# Patient Record
Sex: FEMALE | Race: WHITE | Employment: FULL TIME | URBAN - METROPOLITAN AREA
[De-identification: names, ages, dates, MRNs, and addresses within clinical notes are randomized per-mention and may not be internally consistent; named-entity substitution may affect disease eponyms.]

---

## 2017-03-07 ENCOUNTER — OFFICE VISIT (OUTPATIENT)
Dept: DERMATOLOGY | Age: 37
End: 2017-03-07

## 2017-03-07 DIAGNOSIS — L30.9 FACIAL DERMATITIS: Primary | ICD-10-CM

## 2017-03-07 PROCEDURE — 99213 OFFICE O/P EST LOW 20 MIN: CPT | Performed by: DERMATOLOGY

## 2017-03-07 RX ORDER — ALCLOMETASONE DIPROPIONATE 0.5 MG/G
CREAM TOPICAL
Qty: 30 G | Refills: 2 | Status: SHIPPED | OUTPATIENT
Start: 2017-03-07 | End: 2017-04-07

## 2017-03-30 ENCOUNTER — HOSPITAL ENCOUNTER (OUTPATIENT)
Dept: OTHER | Age: 37
Discharge: HOME | End: 2017-03-30
Attending: ALLERGY & IMMUNOLOGY | Admitting: ALLERGY & IMMUNOLOGY

## 2017-04-02 LAB
ALLERGEN CRAB IGE: 15.2 KU/L
ALLERGEN LOBSTER IGE: 6.24 KU/L
ALLERGEN SEE NOTE: NORMAL
ALLERGEN SHRIMP IGE: 38.1 KU/L

## 2017-04-05 PROBLEM — Z79.899 HIGH RISK MEDICATION USE: Status: ACTIVE | Noted: 2017-04-05

## 2017-04-05 PROBLEM — K50.811 CROHN'S DISEASE OF BOTH SMALL AND LARGE INTESTINE WITH RECTAL BLEEDING (HCC): Status: ACTIVE | Noted: 2017-04-05

## 2017-04-07 ENCOUNTER — OFFICE VISIT (OUTPATIENT)
Dept: RHEUMATOLOGY | Age: 37
End: 2017-04-07

## 2017-04-07 VITALS
BODY MASS INDEX: 32.8 KG/M2 | WEIGHT: 194.1 LBS | SYSTOLIC BLOOD PRESSURE: 120 MMHG | HEART RATE: 97 BPM | DIASTOLIC BLOOD PRESSURE: 78 MMHG

## 2017-04-07 DIAGNOSIS — Z79.899 HIGH RISK MEDICATION USE: ICD-10-CM

## 2017-04-07 DIAGNOSIS — K50.811 CROHN'S DISEASE OF BOTH SMALL AND LARGE INTESTINE WITH RECTAL BLEEDING (HCC): ICD-10-CM

## 2017-04-07 DIAGNOSIS — M45.9 ANKYLOSING SPONDYLITIS (HCC): Primary | ICD-10-CM

## 2017-04-07 LAB
A/G RATIO: 1.3 (ref 1.1–2.2)
ALBUMIN SERPL-MCNC: 4.1 G/DL (ref 3.4–5)
ALP BLD-CCNC: 51 U/L (ref 40–129)
ALT SERPL-CCNC: 44 U/L (ref 10–40)
ANION GAP SERPL CALCULATED.3IONS-SCNC: 15 MMOL/L (ref 3–16)
AST SERPL-CCNC: 27 U/L (ref 15–37)
BASOPHILS ABSOLUTE: 0 K/UL (ref 0–0.2)
BASOPHILS RELATIVE PERCENT: 0.7 %
BILIRUB SERPL-MCNC: 0.7 MG/DL (ref 0–1)
BUN BLDV-MCNC: 10 MG/DL (ref 7–20)
C-REACTIVE PROTEIN: 3.1 MG/L (ref 0–5.1)
CALCIUM SERPL-MCNC: 9.7 MG/DL (ref 8.3–10.6)
CHLORIDE BLD-SCNC: 100 MMOL/L (ref 99–110)
CO2: 26 MMOL/L (ref 21–32)
CREAT SERPL-MCNC: 0.6 MG/DL (ref 0.6–1.1)
EOSINOPHILS ABSOLUTE: 0.2 K/UL (ref 0–0.6)
EOSINOPHILS RELATIVE PERCENT: 2.8 %
GFR AFRICAN AMERICAN: >60
GFR NON-AFRICAN AMERICAN: >60
GLOBULIN: 3.1 G/DL
GLUCOSE BLD-MCNC: 88 MG/DL (ref 70–99)
HCT VFR BLD CALC: 42 % (ref 36–48)
HEMOGLOBIN: 13.8 G/DL (ref 12–16)
LYMPHOCYTES ABSOLUTE: 1.6 K/UL (ref 1–5.1)
LYMPHOCYTES RELATIVE PERCENT: 23.5 %
MCH RBC QN AUTO: 28.9 PG (ref 26–34)
MCHC RBC AUTO-ENTMCNC: 32.8 G/DL (ref 31–36)
MCV RBC AUTO: 88 FL (ref 80–100)
MONOCYTES ABSOLUTE: 0.6 K/UL (ref 0–1.3)
MONOCYTES RELATIVE PERCENT: 8.8 %
NEUTROPHILS ABSOLUTE: 4.4 K/UL (ref 1.7–7.7)
NEUTROPHILS RELATIVE PERCENT: 64.2 %
PDW BLD-RTO: 14.4 % (ref 12.4–15.4)
PLATELET # BLD: 260 K/UL (ref 135–450)
PMV BLD AUTO: 9.5 FL (ref 5–10.5)
POTASSIUM SERPL-SCNC: 4.6 MMOL/L (ref 3.5–5.1)
RBC # BLD: 4.77 M/UL (ref 4–5.2)
SEDIMENTATION RATE, ERYTHROCYTE: 15 MM/HR (ref 0–20)
SODIUM BLD-SCNC: 141 MMOL/L (ref 136–145)
TOTAL PROTEIN: 7.2 G/DL (ref 6.4–8.2)
WBC # BLD: 6.9 K/UL (ref 4–11)

## 2017-04-07 PROCEDURE — 99214 OFFICE O/P EST MOD 30 MIN: CPT | Performed by: INTERNAL MEDICINE

## 2017-04-07 RX ORDER — PANTOPRAZOLE SODIUM 40 MG/1
40 GRANULE, DELAYED RELEASE ORAL
COMMUNITY
End: 2017-09-08

## 2017-04-07 RX ORDER — ALPRAZOLAM 0.5 MG/1
0.5 TABLET ORAL NIGHTLY PRN
COMMUNITY
End: 2018-01-08

## 2017-04-24 ENCOUNTER — TELEPHONE (OUTPATIENT)
Dept: INTERNAL MEDICINE CLINIC | Age: 37
End: 2017-04-24

## 2017-05-24 ENCOUNTER — TELEPHONE (OUTPATIENT)
Dept: RHEUMATOLOGY | Age: 37
End: 2017-05-24

## 2017-06-15 ENCOUNTER — OFFICE VISIT (OUTPATIENT)
Dept: ORTHOPEDIC SURGERY | Age: 37
End: 2017-06-15

## 2017-06-15 VITALS
SYSTOLIC BLOOD PRESSURE: 120 MMHG | TEMPERATURE: 98.1 F | HEIGHT: 65 IN | DIASTOLIC BLOOD PRESSURE: 78 MMHG | BODY MASS INDEX: 30.49 KG/M2 | WEIGHT: 183 LBS | HEART RATE: 85 BPM

## 2017-06-15 DIAGNOSIS — Z96.641 HISTORY OF TOTAL HIP REPLACEMENT, RIGHT: Primary | ICD-10-CM

## 2017-06-15 PROCEDURE — 99213 OFFICE O/P EST LOW 20 MIN: CPT | Performed by: ORTHOPAEDIC SURGERY

## 2017-06-16 ENCOUNTER — TELEPHONE (OUTPATIENT)
Dept: INTERNAL MEDICINE CLINIC | Age: 37
End: 2017-06-16

## 2017-07-18 ENCOUNTER — TELEPHONE (OUTPATIENT)
Dept: INTERNAL MEDICINE CLINIC | Age: 37
End: 2017-07-18

## 2017-07-26 ENCOUNTER — TELEPHONE (OUTPATIENT)
Dept: RHEUMATOLOGY | Age: 37
End: 2017-07-26

## 2017-07-26 DIAGNOSIS — M45.9 ANKYLOSING SPONDYLITIS (HCC): Primary | ICD-10-CM

## 2017-07-27 RX ORDER — METAXALONE 800 MG/1
800 TABLET ORAL 3 TIMES DAILY
Qty: 30 TABLET | Refills: 0 | Status: SHIPPED | OUTPATIENT
Start: 2017-07-27 | End: 2017-08-06

## 2017-08-03 ENCOUNTER — HOSPITAL ENCOUNTER (OUTPATIENT)
Dept: OTHER | Age: 37
Discharge: HOME | End: 2017-08-03
Attending: FAMILY MEDICINE | Admitting: FAMILY MEDICINE

## 2017-08-03 DIAGNOSIS — R52 PAIN: ICD-10-CM

## 2017-08-03 DIAGNOSIS — M45.6 ANKYLOSING SPONDYLITIS LUMBAR REGION (HCC): ICD-10-CM

## 2017-08-03 DIAGNOSIS — M54.6 PAIN IN THORACIC SPINE: ICD-10-CM

## 2017-08-03 DIAGNOSIS — M54.9 DORSALGIA: ICD-10-CM

## 2017-08-23 ENCOUNTER — TELEPHONE (OUTPATIENT)
Dept: INTERNAL MEDICINE CLINIC | Age: 37
End: 2017-08-23

## 2017-09-08 ENCOUNTER — OFFICE VISIT (OUTPATIENT)
Dept: RHEUMATOLOGY | Age: 37
End: 2017-09-08

## 2017-09-08 VITALS
HEART RATE: 100 BPM | BODY MASS INDEX: 32.45 KG/M2 | DIASTOLIC BLOOD PRESSURE: 77 MMHG | SYSTOLIC BLOOD PRESSURE: 129 MMHG | WEIGHT: 192 LBS

## 2017-09-08 DIAGNOSIS — M45.7 ANKYLOSING SPONDYLITIS OF LUMBOSACRAL REGION (HCC): Primary | ICD-10-CM

## 2017-09-08 DIAGNOSIS — Z79.899 HIGH RISK MEDICATION USE: ICD-10-CM

## 2017-09-08 DIAGNOSIS — K50.811 CROHN'S DISEASE OF BOTH SMALL AND LARGE INTESTINE WITH RECTAL BLEEDING (HCC): ICD-10-CM

## 2017-09-08 DIAGNOSIS — G89.4 CHRONIC PAIN SYNDROME: ICD-10-CM

## 2017-09-08 PROBLEM — G89.29 CHRONIC PAIN: Status: ACTIVE | Noted: 2017-09-08

## 2017-09-08 PROCEDURE — 99214 OFFICE O/P EST MOD 30 MIN: CPT | Performed by: INTERNAL MEDICINE

## 2017-09-08 RX ORDER — METHOTREXATE 25 MG/ML
25 INJECTION, SOLUTION INTRA-ARTERIAL; INTRAMUSCULAR; INTRAVENOUS WEEKLY
Qty: 4 VIAL | Refills: 3 | Status: SHIPPED | OUTPATIENT
Start: 2017-09-08 | End: 2018-01-08

## 2017-09-08 RX ORDER — HYDROCODONE BITARTRATE AND ACETAMINOPHEN 5; 325 MG/1; MG/1
1 TABLET ORAL EVERY 6 HOURS PRN
COMMUNITY
End: 2017-11-03

## 2017-09-08 RX ORDER — GABAPENTIN 100 MG/1
CAPSULE ORAL
Qty: 90 CAPSULE | Refills: 4 | Status: SHIPPED | OUTPATIENT
Start: 2017-09-08 | End: 2018-01-08

## 2017-09-08 RX ORDER — OMEPRAZOLE 40 MG/1
40 CAPSULE, DELAYED RELEASE ORAL DAILY
COMMUNITY
End: 2018-01-08

## 2017-09-08 RX ORDER — OXYCODONE HYDROCHLORIDE AND ACETAMINOPHEN 5; 325 MG/1; MG/1
1 TABLET ORAL EVERY 8 HOURS PRN
Qty: 90 TABLET | Refills: 0 | Status: SHIPPED | OUTPATIENT
Start: 2017-10-08 | End: 2018-01-08

## 2017-09-08 RX ORDER — PREDNISONE 10 MG/1
10 TABLET ORAL DAILY
COMMUNITY
End: 2017-09-08 | Stop reason: SDUPTHER

## 2017-09-08 RX ORDER — PREDNISONE 10 MG/1
10 TABLET ORAL 2 TIMES DAILY
Qty: 60 TABLET | Refills: 5 | Status: SHIPPED | OUTPATIENT
Start: 2017-09-08 | End: 2018-01-08

## 2017-09-08 RX ORDER — OXYCODONE HYDROCHLORIDE AND ACETAMINOPHEN 5; 325 MG/1; MG/1
1 TABLET ORAL EVERY 8 HOURS PRN
Qty: 90 TABLET | Refills: 0 | Status: SHIPPED | OUTPATIENT
Start: 2017-09-08 | End: 2018-01-08

## 2017-09-08 RX ORDER — FOLIC ACID 1 MG/1
1 TABLET ORAL DAILY
COMMUNITY
End: 2018-01-08

## 2017-09-12 ENCOUNTER — HOSPITAL ENCOUNTER (OUTPATIENT)
Dept: MRI IMAGING | Age: 37
Discharge: HOME | End: 2017-09-12
Attending: INTERNAL MEDICINE | Admitting: INTERNAL MEDICINE

## 2017-09-12 DIAGNOSIS — M45.7 ANKYLOSING SPONDYLITIS OF LUMBOSACRAL REGION (HCC): ICD-10-CM

## 2017-10-03 LAB
ALBUMIN SERPL-MCNC: 3.9 G/DL (ref 3.4–5)
ALP BLD-CCNC: 40 U/L (ref 40–129)
ALT SERPL-CCNC: 31 U/L (ref 10–40)
AST SERPL-CCNC: 22 U/L (ref 15–37)
BASOPHILS ABSOLUTE: 0 K/UL (ref 0–0.2)
BASOPHILS RELATIVE PERCENT: 0.4 %
BILIRUB SERPL-MCNC: 0.7 MG/DL (ref 0–1)
BILIRUBIN DIRECT: <0.2 MG/DL (ref 0–0.3)
BILIRUBIN, INDIRECT: NORMAL MG/DL (ref 0–1)
C-REACTIVE PROTEIN: 13.7 MG/L (ref 0–5.1)
EOSINOPHILS ABSOLUTE: 0.1 K/UL (ref 0–0.6)
EOSINOPHILS RELATIVE PERCENT: 1.9 %
HCT VFR BLD CALC: 37.6 % (ref 36–48)
HEMOGLOBIN: 12.7 G/DL (ref 12–16)
LYMPHOCYTES ABSOLUTE: 1.6 K/UL (ref 1–5.1)
LYMPHOCYTES RELATIVE PERCENT: 21 %
MCH RBC QN AUTO: 30 PG (ref 26–34)
MCHC RBC AUTO-ENTMCNC: 33.9 G/DL (ref 31–36)
MCV RBC AUTO: 88.3 FL (ref 80–100)
MONOCYTES ABSOLUTE: 0.6 K/UL (ref 0–1.3)
MONOCYTES RELATIVE PERCENT: 7.9 %
NEUTROPHILS ABSOLUTE: 5.3 K/UL (ref 1.7–7.7)
NEUTROPHILS RELATIVE PERCENT: 68.8 %
PDW BLD-RTO: 14.8 % (ref 12.4–15.4)
PLATELET # BLD: 261 K/UL (ref 135–450)
PMV BLD AUTO: 8.7 FL (ref 5–10.5)
RBC # BLD: 4.25 M/UL (ref 4–5.2)
TOTAL CK: 50 U/L (ref 26–192)
TOTAL PROTEIN: 6.7 G/DL (ref 6.4–8.2)
WBC # BLD: 7.7 K/UL (ref 4–11)

## 2017-11-03 ENCOUNTER — OFFICE VISIT (OUTPATIENT)
Dept: RHEUMATOLOGY | Age: 37
End: 2017-11-03

## 2017-11-03 VITALS
SYSTOLIC BLOOD PRESSURE: 139 MMHG | HEART RATE: 86 BPM | BODY MASS INDEX: 32.45 KG/M2 | DIASTOLIC BLOOD PRESSURE: 89 MMHG | WEIGHT: 192 LBS

## 2017-11-03 DIAGNOSIS — M45.7 ANKYLOSING SPONDYLITIS OF LUMBOSACRAL REGION (HCC): Primary | ICD-10-CM

## 2017-11-03 DIAGNOSIS — M26.69 TMJ LOCKING: Primary | ICD-10-CM

## 2017-11-03 DIAGNOSIS — Z79.899 HIGH RISK MEDICATION USE: ICD-10-CM

## 2017-11-03 DIAGNOSIS — K50.811 CROHN'S DISEASE OF BOTH SMALL AND LARGE INTESTINE WITH RECTAL BLEEDING (HCC): ICD-10-CM

## 2017-11-03 DIAGNOSIS — G89.4 CHRONIC PAIN SYNDROME: ICD-10-CM

## 2017-11-03 PROCEDURE — 99214 OFFICE O/P EST MOD 30 MIN: CPT | Performed by: INTERNAL MEDICINE

## 2017-11-03 RX ORDER — PREDNISONE 2.5 MG
2.5 TABLET ORAL DAILY
Qty: 30 TABLET | Refills: 11 | Status: SHIPPED | OUTPATIENT
Start: 2017-11-03 | End: 2018-01-08

## 2017-11-03 NOTE — PROGRESS NOTES
prn 9/8/17  Yes Christiano More MD   ALPRAZolam Verlee Cali) 0.5 MG tablet Take 0.5 mg by mouth nightly as needed for Sleep   Yes Historical Provider, MD   EPINEPHrine (EPIPEN) 0.3 MG/0.3ML LOBO injection Inject 0.3 mLs into the muscle once as needed for 1 dose. 1/23/14  Yes Melanie Queen MD         Subjective:      Review of Systems:    GENERAL: denies fatigue ; no unintentional weight loss  HEENT:   No nasal ulcers; no oral ulcers and sores  LUNGS: No shortness of breathing or  breathing difficulties  GI: No GI upset from medications  MUSCULOSKELETAL:see HPI  SKIN:     Denies having any new skin lesions  LYMPHADENOPATHY:     Denies having any recent infectious illness    Objective:     Physical Exam:    /89   Pulse 86   Wt 192 lb (87.1 kg)   BMI 32.45 kg/m²     GENERAL:Able to ambulate without any assistance. Tearful  HEENT: no evidence of any oral ulcers, lesions or sores  LUNGS:clear to auscultation bilaterally, good air intake  CARDIOVASCULAR:regular rate  MUSCULOSKELETAL: No evidence of any synovitis, swelling, warmth, or limitation of motion of patient's upper or lower peripheral joints. No tenderness over the axial spine to light palpation. There are no  tender fibromyalgia points. Strength is  5/5  in both upper and lower extremities. Skin:Skin is warm and dry. No Petechiae, telangiectasia, purpura, or rash. Palpation of the skin and subcutaneous tissues reveals no evidence of induration, subcutaneous nodule or  tenderness.    Lymphadenopathy: no evidence of any palpable cervical or supraclavicular lymph nodes       DATA:     Labs:     Lab Results   Component Value Date    WBC 7.7 10/03/2017    HGB 12.7 10/03/2017    HCT 37.6 10/03/2017    MCV 88.3 10/03/2017     10/03/2017       Chemistry        Component Value Date/Time     04/07/2017 1027    K 4.6 04/07/2017 1027     04/07/2017 1027    CO2 26 04/07/2017 1027    BUN 10 04/07/2017 1027    CREATININE 0.6 04/07/2017 1027 Component Value Date/Time    CALCIUM 9.7 04/07/2017 1027    ALKPHOS 40 10/03/2017 0916    AST 22 10/03/2017 0916    ALT 31 10/03/2017 0916    BILITOT 0.7 10/03/2017 0916          Lab Results   Component Value Date    SEDRATE 15 04/07/2017     No results found for: CHRIS, KEVIN, SSA, SSB, C3, C4  No results found for: HAV, HEPAIGM, HEPBIGM, HEPBCAB, HBEAG, HEPCAB  No results found for: CHRIS, ANATITER, ANAINT, PATH  No results found for: DSDNAG, DSDNAIGGIFA  No results found for: SSAROAB, SSALAAB  No results found for: SMAB, RNPAB  No results found for: CENTABIGG  Lab Results   Component Value Date    SEDRATE 15 04/07/2017     No results found for: C3, C4, ACE  No results found for: JO1, VITD25, [id number]    No results found. Medications tried//failed:    Stelara    Assessment/Plan:       Assessment/Plan:  Stepan Jefferson was seen today for follow-up. Diagnoses and all orders for this visit:    Ankylosing spondylitis of lumbosacral region ([facility] [address] - Currently stable on methotrexate 25 mg subcutaneously and Cimzia subcutaneous injectables. Doing well. Tapering instructions for the prednisone given and also for the Neurontin. By next visit she'll be off of both. Labs to be done prior to next office visit. In addition advocated that she start a dedicated exercise program.  -     CBC Auto Differential  -     C-Reactive Protein  -     Creatinine, Serum  -     Hepatic Function Panel  -     predniSONE (DELTASONE) 2.5 MG tablet; Take 1 tablet by mouth daily  -     CBC Auto Differential; Future  -     C-Reactive Protein; Future  -     Creatinine, Serum; Future  -     Hepatic Function Panel; Future    High risk medication use - Will check labs to evaluate for any evidence of drug toxicity or side effects.    -     CBC Auto Differential  -     C-Reactive Protein  -     Creatinine, Serum  -     Hepatic Function Panel  -     CBC Auto Differential; Future  -     C-Reactive Protein; Future  -     Creatinine, Serum;  Future  - Hepatic Function Panel; Future    Crohn's disease of both small and large intestine with rectal bleeding ([address]- currently stable on methotrexate and Cimzia. Continue to follow-up with Dr. Naveen Barth. Chronic pain syndrome- patient no longer needs any chronic narcotics. Doing well. Return in about 3 months (around 2/3/2018). The risks and benefits of my recommendations, as well as other treatment options, benefits and side effects were discussed with the patient today. Questions were answered. [address] [facility]  [facility]  [address] [place] [address] [address]  ZYZAL:[phone]  CBW:[phone]    NOTE: This report is transcribed by using voice recognition software dragon. Every effort is made to ensure accuracy; however, inadvertent computerized transcription errors may be present.                      Mariana Mata MD, 11/3/2017 9:42 AM

## 2017-12-12 ENCOUNTER — TELEPHONE (OUTPATIENT)
Dept: INTERNAL MEDICINE CLINIC | Age: 37
End: 2017-12-12

## 2017-12-12 DIAGNOSIS — M45.7 ANKYLOSING SPONDYLITIS OF LUMBOSACRAL REGION (HCC): ICD-10-CM

## 2017-12-12 NOTE — TELEPHONE ENCOUNTER
Pt calling, she was seen on 11/3. She states Dr was going to try and get her Cimzia approved again. Pt has nto heard anything and it out of medication. Viewed media and did not see where a reply has been sent back. Pt also states her specialty pharmacy will be changing 1/1/2018. Not sure if this will have to try and get approved again because of that? Asking for a call back.

## 2017-12-12 NOTE — TELEPHONE ENCOUNTER
Spoke with Nicola Zeng They do not have record I resubmitted for pt and informed her  /Pt insurance will stay the same but meds will be from [facility]

## 2017-12-20 ENCOUNTER — TELEPHONE (OUTPATIENT)
Dept: RHEUMATOLOGY | Age: 37
End: 2017-12-20

## 2017-12-20 NOTE — TELEPHONE ENCOUNTER
Spoke with pt about denial of cimzia (which her insurance has been paying for ) per pt    Pt has tried Humira - not effective  Enbrel- Rectal abscesses  Remicade- anaphylactic symptoms  Allergy list updated

## 2017-12-21 NOTE — PROGRESS NOTES
Addendum to note dictated November 3, 2017.     Medications tried//failed//contraindicated:    Stelaralack of efficacy  Humiralack of efficacy  Remicadeanaphylactic reaction  Cosentyxcontraindicated given her underlying Crohn's disease  Enbrelrectal abscess

## 2017-12-29 ENCOUNTER — TELEPHONE (OUTPATIENT)
Dept: RHEUMATOLOGY | Age: 37
End: 2017-12-29

## 2017-12-29 NOTE — TELEPHONE ENCOUNTER
Fax received for approval of Cimzia Pt was informed  Approved 12/2/17- 12/28/2022 Pt informed and med pended Pt has some syringes at home

## 2018-01-08 ENCOUNTER — OFFICE VISIT (OUTPATIENT)
Dept: BARIATRICS/WEIGHT MGMT | Age: 38
End: 2018-01-08

## 2018-01-08 VITALS
BODY MASS INDEX: 32.65 KG/M2 | HEIGHT: 65 IN | DIASTOLIC BLOOD PRESSURE: 82 MMHG | WEIGHT: 196 LBS | SYSTOLIC BLOOD PRESSURE: 134 MMHG | HEART RATE: 100 BPM

## 2018-01-08 DIAGNOSIS — E66.9 CLASS 1 OBESITY: Primary | ICD-10-CM

## 2018-01-08 DIAGNOSIS — Z71.3 DIETARY COUNSELING AND SURVEILLANCE: ICD-10-CM

## 2018-01-08 DIAGNOSIS — G47.33 OBSTRUCTIVE SLEEP APNEA: ICD-10-CM

## 2018-01-08 DIAGNOSIS — Z79.899 HIGH RISK MEDICATIONS (NOT ANTICOAGULANTS) LONG-TERM USE: ICD-10-CM

## 2018-01-08 PROCEDURE — 93000 ELECTROCARDIOGRAM COMPLETE: CPT | Performed by: FAMILY MEDICINE

## 2018-01-08 PROCEDURE — 99204 OFFICE O/P NEW MOD 45 MIN: CPT | Performed by: FAMILY MEDICINE

## 2018-01-08 RX ORDER — FOLIC ACID 1 MG/1
1 TABLET ORAL DAILY
COMMUNITY
End: 2018-05-04 | Stop reason: SDUPTHER

## 2018-01-08 RX ORDER — METHOTREXATE SODIUM 1 G/1
INJECTION, POWDER, LYOPHILIZED, FOR SOLUTION INTRA-ARTERIAL; INTRAMUSCULAR; INTRATHECAL; INTRAVENOUS WEEKLY
COMMUNITY
End: 2018-02-02 | Stop reason: CLARIF

## 2018-01-08 ASSESSMENT — PATIENT HEALTH QUESTIONNAIRE - PHQ9
SUM OF ALL RESPONSES TO PHQ QUESTIONS 1-9: 0
SUM OF ALL RESPONSES TO PHQ9 QUESTIONS 1 & 2: 0
1. LITTLE INTEREST OR PLEASURE IN DOING THINGS: 0
2. FEELING DOWN, DEPRESSED OR HOPELESS: 0

## 2018-01-08 ASSESSMENT — ENCOUNTER SYMPTOMS
EYES NEGATIVE: 1
RESPIRATORY NEGATIVE: 1
GASTROINTESTINAL NEGATIVE: 1

## 2018-01-08 NOTE — PROGRESS NOTES
Patient: Gissell Davies                      Encounter Date: 1/8/2018    YOB: 1980               Age: 40 y.o.    BP (!) 136/94   Pulse 100   Ht 5' 4.5\" (1.638 m)   Wt 196 lb (88.9 kg)   BMI 33.12 kg/m²                                          Body mass index is 33.12 kg/m². Chief Complaint   Patient presents with    Weight Management     NP, MWM       HPI:    40 y.o. female presents to establish care and join our medical weight loss program. The patient's medical history is significant for class I obesity, ankylosing spondylitis, and Crohn's disease. The patient has a long-standing history of obesity which started gradually. The problem is mild. The patient has been gaining weight. Risk factors include annual weight gain of >2 lbs (1 kg)/ year and sedentary lifestyle. Aggravating factors include poor diet and lack of physical activity. The patient has tried various diet/exercise plans which have been ineffective in the long-run. She was recently weaned of off prednisone and gabapentin. When did you become overweight? [] Childhood   [] Teens   [x] Adulthood   [] Pregnancy   [] Menopause    Highest adult weight: Current weight      Weight history:    Vitals in Chronological Order 6/15/2016 6/28/2016 10/21/2016 12/27/2016   Weight 183 lb 182 lb 187 lb 3.2 oz 183 lb     Vitals in Chronological Order 4/7/2017 6/15/2017 9/8/2017 11/3/2017   Weight 194 lb 1.6 oz 183 lb 192 lb 192 lb     Vitals in Chronological Order 1/8/2018   Weight 196 lb       Triggers for weight gain? [x] Stress   [] Illness   [] Medications   [] Travel    [] Injury     [] Nightshift work   [] Insomnia  [] No specific triggers   [] Other    Food triggers:   [x] Stress   [x] Boredom   [x] Fast food   [x] Eating out   [] Seeking reward   [] Social     Have you ever taken medications to help you lose weight?    [] Yes  [x] No    Have you ever been on a meal replacement program?  [] Yes  [x] No    The patient digestive system(787.99)    Abnormal screening cardiac CT    General medical examination    Other general medical examination for administrative purposes    Encounter for long-term (current) use of other medications    Need for influenza vaccination    Tachycardia    Ulcerative colitis ([address]    Perirectal fistula    Primary localized osteoarthrosis, pelvic region and thigh    History of total hip replacement    Acute pharyngitis    High risk medication use    Crohn's disease of both small and large intestine with rectal bleeding (HCC)    Ankylosing spondylitis of lumbosacral region ([address]    Chronic pain       Past Medical History:   Diagnosis Date    Ankylosing spondylitis ([address]     Arthritis     Crohn's     Stotz    Fistula     RECTAL    GERD (gastroesophageal reflux disease)     Iritis 2010    Microcytic anemia     Umbilical hernia        Past Surgical History:   Procedure Laterality Date    ABSCESS DRAINAGE  9-2011    RECTAL    COLONOSCOPY      COLONOSCOPY  11-    COLONOSCOPY  12/2011    JOINT REPLACEMENT Right     HIP    OTHER SURGICAL HISTORY  2007    Terminal ileum resection     RECTAL SURGERY  07/31/2012    RECTAL ADVANCEMENT FLAP    SIGMOIDOSCOPY  9/12/12    FLEXIBLE    SIGMOIDOSCOPY  6/7/2017    flexible sigmoidoscopy    SMALL INTESTINE SURGERY      ileocecectomy    UPPER GASTROINTESTINAL ENDOSCOPY         Family History   Problem Relation Age of Onset    Breast Cancer Mother     Cancer Mother     High Blood Pressure Father     High Cholesterol Father     Diabetes Father     Cancer Paternal Aunt      colon    Diabetes Maternal Grandmother     Cancer Maternal Grandmother      colon    Cancer Maternal Grandfather      colon    High Blood Pressure Paternal Grandmother     Cancer Paternal Grandmother      colon       Review of Systems   HENT: Negative. Eyes: Negative. Respiratory: Negative. Cardiovascular: Negative.     Gastrointestinal: Negative. Endocrine: Negative. Musculoskeletal: Negative. Neurological: Negative. Psychiatric/Behavioral: Negative. Physical Exam   Constitutional: She is oriented to person, place, and time. She appears well-developed and well-nourished. Eyes: Conjunctivae and EOM are normal.   Neck: Normal range of motion. Neck supple. No thyromegaly present. Cardiovascular: Normal rate, regular rhythm and normal heart sounds. Exam reveals no gallop and no friction rub. No murmur heard. Pulmonary/Chest: Effort normal and breath sounds normal. No respiratory distress. She has no wheezes. She has no rales. Abdominal: Soft. She exhibits no mass. There is no tenderness. There is no rebound and no guarding. Musculoskeletal: She exhibits no edema. Lymphadenopathy:     She has no cervical adenopathy. Neurological: She is alert and oriented to person, place, and time. Skin: Skin is warm and dry. Psychiatric: She has a normal mood and affect.  Her behavior is normal. Judgment and thought content normal.       Hospital Outpatient Visit on 09/12/2017   Component Date Value Ref Range Status    WBC 10/03/2017 7.7  4.0 - 11.0 K/uL Final    RBC 10/03/2017 4.25  4.00 - 5.20 M/uL Final    Hemoglobin 10/03/2017 12.7  12.0 - 16.0 g/dL Final    Hematocrit 10/03/2017 37.6  36.0 - 48.0 % Final    MCV 10/03/2017 88.3  80.0 - 100.0 fL Final    MCH 10/03/2017 30.0  26.0 - 34.0 pg Final    MCHC 10/03/2017 33.9  31.0 - 36.0 g/dL Final    RDW 10/03/2017 14.8  12.4 - 15.4 % Final    Platelets 04/11/9857 261  135 - 450 K/uL Final    MPV 10/03/2017 8.7  5.0 - 10.5 fL Final    Neutrophils % 10/03/2017 68.8  % Final    Lymphocytes % 10/03/2017 21.0  % Final    Monocytes % 10/03/2017 7.9  % Final    Eosinophils % 10/03/2017 1.9  % Final    Basophils % 10/03/2017 0.4  % Final    Neutrophils # 10/03/2017 5.3  1.7 - 7.7 K/uL Final    Lymphocytes # 10/03/2017 1.6  1.0 - 5.1 K/ Final    Monocytes # 10/03/2017

## 2018-01-08 NOTE — PROGRESS NOTES
often. Patient describes level of activity as sedentary. Goals  Weight: 150-160  Health Improvement: Less pain from arthritis    Assessment  Nutritional Needs: RMR=(9.99 x 88.9) + (6.25 x 163.8) - (4.92 x 37 y.o.) -161= 1569 kcal x 1.4 (sedentary activity factor)= 2197 kcal - 1000 (for 2 lb weight loss/week)= 1197 kcal.    Plan  Plan/Recommendations: General weight loss/lifestyle modification strategies discussed (elicit support from others; identify saboteurs; non-food rewards, etc). Optifast:  Pt is less interested unless meds are not an option  Diet Medications:  Pt is interested    PES Statement:  Overweight/Obesity related to increased caloric intake and decreased physical activity as evidenced by BMI. Body mass index is 33.12 kg/m². .    Will follow up as necessary.     Isha Terjo

## 2018-01-08 NOTE — PATIENT INSTRUCTIONS
changes in your eating habits. Instead of a diet, focus on lifestyle changes that will improve your health and achieve the right balance of energy and calories. To lose weight, you need to burn more calories than you take in. You can do it by eating healthy foods in reasonable amounts and becoming more active, even a little bit every day. Making small changes over time can add up to a lot. Make a plan for change. Many people have found that naming their reasons for change and staying focused on their plan can make a big difference. Work with your doctor to create a plan that is right for you. · Ask yourself: Michael Merrill are my personal, most powerful reasons for wanting this change? What will my life look like when I've made the change? \"  · Set your long-term goal. Make it specific, such as \"I will lose x pounds. \"  · Break your long-term goal into smaller, short-term goals. Make these small steps specific and within your reach, things you know you can do. These steps are what keep you going from day to day. How can you stay on your plan for change? Be ready. Choose to start during a time when there are few events that might trigger slip-ups, like holidays, social events, and high-stress periods. Decide on your first few steps. Most people have more success when they make small changes, one step at a time. For example, you might switch a daily candy bar to a piece of fruit, walk 10 minutes more, or add more vegetables to a meal.  Line up your support people. Make sure you're not going to be alone as you make this change. Connect with people who understand how important it is to you. Ask family members and friends for help in keeping with your plan. And think about who could make it harder for you, and how to handle them. Try tracking. People who keep track of what they eat, feel, and do are better at losing weight. Try writing down things like:  · What and how much you eat.   · How you feel before and after each

## 2018-01-09 DIAGNOSIS — Z79.899 HIGH RISK MEDICATION USE: ICD-10-CM

## 2018-01-09 DIAGNOSIS — E66.9 CLASS 1 OBESITY: ICD-10-CM

## 2018-01-09 DIAGNOSIS — M45.7 ANKYLOSING SPONDYLITIS OF LUMBOSACRAL REGION (HCC): ICD-10-CM

## 2018-01-09 LAB
ALBUMIN SERPL-MCNC: 4.6 G/DL (ref 3.4–5)
ALP BLD-CCNC: 50 U/L (ref 40–129)
ALT SERPL-CCNC: 50 U/L (ref 10–40)
AST SERPL-CCNC: 34 U/L (ref 15–37)
BASOPHILS ABSOLUTE: 0 K/UL (ref 0–0.2)
BASOPHILS RELATIVE PERCENT: 0.6 %
BILIRUB SERPL-MCNC: 1.2 MG/DL (ref 0–1)
BILIRUBIN DIRECT: <0.2 MG/DL (ref 0–0.3)
BILIRUBIN, INDIRECT: ABNORMAL MG/DL (ref 0–1)
C-REACTIVE PROTEIN: 1.9 MG/L (ref 0–5.1)
CHOLESTEROL, TOTAL: 147 MG/DL (ref 0–199)
CREAT SERPL-MCNC: 0.6 MG/DL (ref 0.6–1.1)
EOSINOPHILS ABSOLUTE: 0.1 K/UL (ref 0–0.6)
EOSINOPHILS RELATIVE PERCENT: 2.2 %
FOLATE: >20 NG/ML (ref 4.78–24.2)
GFR AFRICAN AMERICAN: >60
GFR NON-AFRICAN AMERICAN: >60
HCT VFR BLD CALC: 42.2 % (ref 36–48)
HDLC SERPL-MCNC: 59 MG/DL (ref 40–60)
HEMOGLOBIN: 13.8 G/DL (ref 12–16)
LDL CHOLESTEROL CALCULATED: 63 MG/DL
LYMPHOCYTES ABSOLUTE: 1.6 K/UL (ref 1–5.1)
LYMPHOCYTES RELATIVE PERCENT: 25.2 %
MCH RBC QN AUTO: 29.4 PG (ref 26–34)
MCHC RBC AUTO-ENTMCNC: 32.8 G/DL (ref 31–36)
MCV RBC AUTO: 89.7 FL (ref 80–100)
MONOCYTES ABSOLUTE: 0.5 K/UL (ref 0–1.3)
MONOCYTES RELATIVE PERCENT: 7.6 %
NEUTROPHILS ABSOLUTE: 4.1 K/UL (ref 1.7–7.7)
NEUTROPHILS RELATIVE PERCENT: 64.4 %
PDW BLD-RTO: 13.6 % (ref 12.4–15.4)
PLATELET # BLD: 275 K/UL (ref 135–450)
PMV BLD AUTO: 8.9 FL (ref 5–10.5)
RBC # BLD: 4.71 M/UL (ref 4–5.2)
TOTAL PROTEIN: 7.5 G/DL (ref 6.4–8.2)
TRIGL SERPL-MCNC: 123 MG/DL (ref 0–150)
TSH REFLEX: 1.23 UIU/ML (ref 0.27–4.2)
VITAMIN B-12: 802 PG/ML (ref 211–911)
VITAMIN D 25-HYDROXY: 17.5 NG/ML
VLDLC SERPL CALC-MCNC: 25 MG/DL
WBC # BLD: 6.3 K/UL (ref 4–11)

## 2018-01-10 RX ORDER — ERGOCALCIFEROL 1.25 MG/1
50000 CAPSULE ORAL WEEKLY
Qty: 8 CAPSULE | Refills: 0 | Status: SHIPPED | OUTPATIENT
Start: 2018-01-10 | End: 2018-05-07

## 2018-01-19 ENCOUNTER — OFFICE VISIT (OUTPATIENT)
Dept: BARIATRICS/WEIGHT MGMT | Age: 38
End: 2018-01-19

## 2018-01-19 VITALS
HEART RATE: 100 BPM | BODY MASS INDEX: 32.49 KG/M2 | DIASTOLIC BLOOD PRESSURE: 86 MMHG | SYSTOLIC BLOOD PRESSURE: 132 MMHG | WEIGHT: 195 LBS | HEIGHT: 65 IN

## 2018-01-19 DIAGNOSIS — E55.9 VITAMIN D DEFICIENCY: ICD-10-CM

## 2018-01-19 DIAGNOSIS — R74.01 ELEVATED ALT MEASUREMENT: ICD-10-CM

## 2018-01-19 DIAGNOSIS — Z71.3 DIETARY COUNSELING AND SURVEILLANCE: ICD-10-CM

## 2018-01-19 DIAGNOSIS — E66.9 CLASS 1 OBESITY: Primary | ICD-10-CM

## 2018-01-19 PROCEDURE — 99214 OFFICE O/P EST MOD 30 MIN: CPT | Performed by: FAMILY MEDICINE

## 2018-01-19 ASSESSMENT — ENCOUNTER SYMPTOMS
EYES NEGATIVE: 1
RESPIRATORY NEGATIVE: 1
GASTROINTESTINAL NEGATIVE: 1

## 2018-01-19 NOTE — PROGRESS NOTES
exercise and reducing sedentary time  [x] Treatment consent- Patient understands and agrees with the treatment plan   [x] Proper use of medication and side effects  [x] Labs  [x] EKG- NSR    Patient denies any history of cardiovascular disease (e.g., CAD, stroke, arrhythmias, CHF, uncontrolled HTN), seizure disorder, MAOI use within the last 2 weeks, hyperthyroidism, glaucoma, agitated states, history of drug abuse, pregnancy, nursing, known hypersensitivity to the prescribing meds, history of pancreatitis, or personal or family history of thyroid medullary cancer. Treatment start date: 1/20/18  12 weeks: 4/20/18  Starting weight: 195 pounds  Goal: At least 5% (9.5 pounds)    Key dietary points:    - Meats (preferably organic or grass fed) are great sources of protein and have no carbohydrates. - Recommend coconut, olive, avocado, or almond oils. - When buying dairy, choose regular or full fat options. - Choose vegetables that grow above ground as they are generally lower in carbohydrates and higher in fiber.  - Avoid starches such as bread, rice, potatoes, pasta and all sources of simple sugars (desserts, soda, breakfast cereals). - Choose beverages that are calorie and sugar free. Reminder regarding weight loss medications: You must be seen in office every 2-4 weeks to have your prescriptions refilled. If you are off of your medication for longer than 7 days, you will not be able to restart the medication for at least 6 months. Always call our office to report any side effects. Females, it is your responsibility to obtain negative pregnancy tests each month. No orders of the defined types were placed in this encounter. No Follow-up on file. This dictation was performed with a verbal recognition program (Dragon) and all efforts were made to ensure accuracy of this dictation. It is possible that there are still dictated errors within this note.  If so, please bring any errors to my

## 2018-02-02 DIAGNOSIS — M45.7 ANKYLOSING SPONDYLITIS OF LUMBOSACRAL REGION (HCC): ICD-10-CM

## 2018-02-02 RX ORDER — METHOTREXATE 25 MG/ML
25 INJECTION, SOLUTION INTRA-ARTERIAL; INTRAMUSCULAR; INTRAVENOUS WEEKLY
Qty: 4 VIAL | Refills: 11 | Status: SHIPPED | OUTPATIENT
Start: 2018-02-02 | End: 2019-02-21 | Stop reason: SDUPTHER

## 2018-02-05 ENCOUNTER — OFFICE VISIT (OUTPATIENT)
Dept: BARIATRICS/WEIGHT MGMT | Age: 38
End: 2018-02-05

## 2018-02-05 VITALS
HEIGHT: 65 IN | SYSTOLIC BLOOD PRESSURE: 126 MMHG | HEART RATE: 88 BPM | BODY MASS INDEX: 31.82 KG/M2 | DIASTOLIC BLOOD PRESSURE: 78 MMHG | WEIGHT: 191 LBS

## 2018-02-05 DIAGNOSIS — E66.9 CLASS 1 OBESITY: Primary | ICD-10-CM

## 2018-02-05 DIAGNOSIS — Z71.3 DIETARY COUNSELING AND SURVEILLANCE: ICD-10-CM

## 2018-02-05 PROCEDURE — 99213 OFFICE O/P EST LOW 20 MIN: CPT | Performed by: FAMILY MEDICINE

## 2018-02-05 ASSESSMENT — ENCOUNTER SYMPTOMS
GASTROINTESTINAL NEGATIVE: 1
RESPIRATORY NEGATIVE: 1
EYES NEGATIVE: 1

## 2018-02-05 NOTE — PROGRESS NOTES
vegetables that grow above ground as they are generally lower in carbohydrates and higher in fiber.  - Avoid starches such as bread, rice, potatoes, pasta and all sources of simple sugars (desserts, soda, breakfast cereals). - Choose beverages that are calorie and sugar free. Reminder regarding weight loss medications: You must be seen in office every 2-4 weeks to have your prescriptions refilled. If you are off of your medication for longer than 7 days, you will not be able to restart the medication for at least 6 months. Always call our office to report any side effects. Females, it is your responsibility to obtain negative pregnancy tests each month. No orders of the defined types were placed in this encounter. No Follow-up on file. This dictation was performed with a verbal recognition program (Dragon) and all efforts were made to ensure accuracy of this dictation. It is possible that there are still dictated errors within this note. If so, please bring any errors to my attention for correction.

## 2018-02-08 ENCOUNTER — OFFICE VISIT (OUTPATIENT)
Dept: RHEUMATOLOGY | Age: 38
End: 2018-02-08

## 2018-02-08 VITALS
DIASTOLIC BLOOD PRESSURE: 72 MMHG | HEART RATE: 76 BPM | SYSTOLIC BLOOD PRESSURE: 104 MMHG | BODY MASS INDEX: 32.11 KG/M2 | WEIGHT: 190 LBS

## 2018-02-08 DIAGNOSIS — M45.7 ANKYLOSING SPONDYLITIS OF LUMBOSACRAL REGION (HCC): Primary | ICD-10-CM

## 2018-02-08 DIAGNOSIS — K50.811 CROHN'S DISEASE OF BOTH SMALL AND LARGE INTESTINE WITH RECTAL BLEEDING (HCC): ICD-10-CM

## 2018-02-08 DIAGNOSIS — Z79.899 HIGH RISK MEDICATION USE: ICD-10-CM

## 2018-02-08 PROCEDURE — 99214 OFFICE O/P EST MOD 30 MIN: CPT | Performed by: INTERNAL MEDICINE

## 2018-02-08 NOTE — PATIENT INSTRUCTIONS
1.Continue with current medications as indicated      2. Check laboratory studies before next visit to evaluate for any evidence of drug toxicity or side effects      3. Return visit for followup in 3 months or sooner if needed    Lab Results   Component Value Date    WBC 6.3 01/09/2018    HGB 13.8 01/09/2018    HCT 42.2 01/09/2018    MCV 89.7 01/09/2018     01/09/2018       Chemistry        Component Value Date/Time     04/07/2017 1027    K 4.6 04/07/2017 1027     04/07/2017 1027    CO2 26 04/07/2017 1027    BUN 10 04/07/2017 1027    CREATININE 0.6 01/09/2018 0937        Component Value Date/Time    CALCIUM 9.7 04/07/2017 1027    ALKPHOS 50 01/09/2018 0937    AST 34 01/09/2018 0937    ALT 50 (H) 01/09/2018 0937    BILITOT 1.2 (H) 01/09/2018 0937          Lab Results   Component Value Date    SEDRATE 15 04/07/2017     Lab Results   Component Value Date    CRP 1.9 01/09/2018       .

## 2018-03-06 ENCOUNTER — OFFICE VISIT (OUTPATIENT)
Dept: BARIATRICS/WEIGHT MGMT | Age: 38
End: 2018-03-06

## 2018-03-06 VITALS
HEIGHT: 65 IN | WEIGHT: 186 LBS | DIASTOLIC BLOOD PRESSURE: 76 MMHG | SYSTOLIC BLOOD PRESSURE: 110 MMHG | HEART RATE: 76 BPM | BODY MASS INDEX: 30.99 KG/M2

## 2018-03-06 DIAGNOSIS — E66.9 CLASS 1 OBESITY: Primary | ICD-10-CM

## 2018-03-06 DIAGNOSIS — Z71.3 DIETARY COUNSELING AND SURVEILLANCE: ICD-10-CM

## 2018-03-06 PROCEDURE — 99213 OFFICE O/P EST LOW 20 MIN: CPT | Performed by: FAMILY MEDICINE

## 2018-03-06 ASSESSMENT — ENCOUNTER SYMPTOMS
RESPIRATORY NEGATIVE: 1
GASTROINTESTINAL NEGATIVE: 1
EYES NEGATIVE: 1

## 2018-03-06 NOTE — PATIENT INSTRUCTIONS
changes in your eating habits. Instead of a diet, focus on lifestyle changes that will improve your health and achieve the right balance of energy and calories. To lose weight, you need to burn more calories than you take in. You can do it by eating healthy foods in reasonable amounts and becoming more active, even a little bit every day. Making small changes over time can add up to a lot. Make a plan for change. Many people have found that naming their reasons for change and staying focused on their plan can make a big difference. Work with your doctor to create a plan that is right for you. · Ask yourself: Giovanny Craig are my personal, most powerful reasons for wanting this change? What will my life look like when I've made the change? \"  · Set your long-term goal. Make it specific, such as \"I will lose x pounds. \"  · Break your long-term goal into smaller, short-term goals. Make these small steps specific and within your reach, things you know you can do. These steps are what keep you going from day to day. How can you stay on your plan for change? Be ready. Choose to start during a time when there are few events that might trigger slip-ups, like holidays, social events, and high-stress periods. Decide on your first few steps. Most people have more success when they make small changes, one step at a time. For example, you might switch a daily candy bar to a piece of fruit, walk 10 minutes more, or add more vegetables to a meal.  Line up your support people. Make sure you're not going to be alone as you make this change. Connect with people who understand how important it is to you. Ask family members and friends for help in keeping with your plan. And think about who could make it harder for you, and how to handle them. Try tracking. People who keep track of what they eat, feel, and do are better at losing weight. Try writing down things like:  · What and how much you eat.   · How you feel before and after each

## 2018-03-06 NOTE — PROGRESS NOTES
Patient: Ren Lyle                      Encounter Date: 3/6/2018    YOB: 1980                Age: 40 y.o. Chief Complaint   Patient presents with    Weight Management     4th MWM, 1200 vinny/ LC, Qsymia       /76   Pulse 76   Ht 5' 4.5\" (1.638 m)   Wt 186 lb (84.4 kg)   BMI 31.43 kg/m²     Body mass index is 31.43 kg/m². HPI: 40 y.o. female with a long-standing history of obesity presents today for follow-up. she has lost 5 pounds since her last visit on 2/5. Current treatment includes Qsymia 7.5-46 mg daily and 1200-Vinny/low carb diet. Tolerating it well. Met with the dietitian today. Food recall reviewed with the patient. Overall, making good dietary choices. Not drinking enough water. Hasn't been exercisng due to busy work schedule and ongoing fatigue. Sleep study was normal. She is concerned about possible adrenal insufficiency versus side effect from methotrexate. Will be contacting her rheumatologist to discuss. Medication(s): Appetite well controlled? [x]Yes      []No    Focus:     [x]Good     []Fair     []Poor        Side effects? Dry mouth         Any recent change in medication(s)? No    Exercise: []Cardio     []Resistance/strength training     [x]Other: None     Allergies   Allergen Reactions    Ciprofloxacin Anaphylaxis    Infliximab Anaphylaxis    Remicade [Infliximab Injection]     Enbrel [Etanercept] Other (See Comments)     Rectal abscess     Humira [Adalimumab] Other (See Comments)     Ineffective         Current Outpatient Prescriptions:     Phentermine-Topiramate (QSYMIA) 7.5-46 MG CP24, Take 1 capsule by mouth daily for 30 days. , Disp: 30 capsule, Rfl: 0    methotrexate Sodium (RHEUMATREX) 50 MG/2ML chemo injection, Inject 1 mL into the skin once a week Subcutaneously, Disp: 4 vial, Rfl: 11    vitamin D (ERGOCALCIFEROL) 87612 units CAPS capsule, Take 1 capsule by mouth once a week, Disp: 8 capsule, Rfl: 0    certolizumab pegol (CIMZIA) 2 She is oriented to person, place, and time. She appears well-developed and well-nourished. Neck: No thyromegaly present. Cardiovascular: Normal rate, regular rhythm and normal heart sounds. Exam reveals no gallop and no friction rub. No murmur heard. Pulmonary/Chest: Effort normal and breath sounds normal. No respiratory distress. She has no wheezes. She has no rales. Musculoskeletal: She exhibits no edema. Neurological: She is alert and oriented to person, place, and time. Skin: Skin is warm and dry. Psychiatric: She has a normal mood and affect. Her behavior is normal. Judgment and thought content normal.       Orders Only on 01/09/2018   Component Date Value Ref Range Status    TSH 01/09/2018 1.23  0.27 - 4.20 uIU/mL Final    Vitamin B-12 01/09/2018 802  211 - 911 pg/mL Final    Folate 01/09/2018 >20.00  4.78 - 24.20 ng/mL Final    Comment: Effective 11-15-16 10:00am EST  Please note reference ranges have  changed for Folate.  Vit D, 25-Hydroxy 01/09/2018 17.5* >=30 ng/mL Final    Comment: <=20 ng/mL. ........... Kisha Arguello Deficient  21-29 ng/mL. ......... Kisha Arguello Insufficient  >=30 ng/mL. ........ Kisha Arguello Sufficient      Cholesterol, Total 01/09/2018 147  0 - 199 mg/dL Final    Triglycerides 01/09/2018 123  0 - 150 mg/dL Final    HDL 01/09/2018 59  40 - 60 mg/dL Final    LDL Calculated 01/09/2018 63  <100 mg/dL Final    VLDL Cholesterol Calculated 01/09/2018 25  Not Established mg/dL Final         Assessment and Plan:    ICD-10-CM ICD-9-CM    1. Class 1 obesity E66.9 278.00 Improving. Continue current management. Encouraged increased fluid intake. Discussed discontinuing Qsymia temporarily to see if that helps improve the fatigue. Patient declines- had the fatigue even before starting treatment. She will let me know if she changes her mind. F/u in 4 weeks. 2. BMI 31.0-31.9,adult Z68.31 V85.31    3.  Dietary counseling and surveillance Z71.3 V65.3          Nutrition Plan: [] LCHF/Ketogenic   [x] Modified sugars (desserts, soda, breakfast cereals). - Choose beverages that are calorie and sugar free. Reminder regarding weight loss medications: You must be seen in office every 2-4 weeks to have your prescriptions refilled. If you are off of your medication for longer than 7 days, you will not be able to restart the medication for at least 6 months. Always call our office to report any side effects. Females, it is your responsibility to obtain negative pregnancy tests each month. No orders of the defined types were placed in this encounter. No Follow-up on file. This dictation was performed with a verbal recognition program (Dragon) and all efforts were made to ensure accuracy of this dictation. It is possible that there are still dictated errors within this note. If so, please bring any errors to my attention for correction.

## 2018-03-29 ENCOUNTER — HOSPITAL ENCOUNTER (OUTPATIENT)
Dept: OTHER | Age: 38
Discharge: HOME | End: 2018-03-29
Attending: INTERNAL MEDICINE | Admitting: INTERNAL MEDICINE

## 2018-03-29 DIAGNOSIS — E27.40 ADRENAL INSUFFICIENCY (HCC): ICD-10-CM

## 2018-03-29 LAB — CORTISOL - AM: 9.4 UG/DL (ref 4.3–22.4)

## 2018-04-02 LAB — ADRENOCORTICOTROPIC HORMONE: 10 PG/ML (ref 6–58)

## 2018-04-09 ENCOUNTER — OFFICE VISIT (OUTPATIENT)
Dept: BARIATRICS/WEIGHT MGMT | Age: 38
End: 2018-04-09

## 2018-04-09 VITALS
WEIGHT: 182 LBS | HEIGHT: 65 IN | SYSTOLIC BLOOD PRESSURE: 122 MMHG | HEART RATE: 100 BPM | BODY MASS INDEX: 30.32 KG/M2 | DIASTOLIC BLOOD PRESSURE: 78 MMHG

## 2018-04-09 DIAGNOSIS — E66.9 CLASS 1 OBESITY: Primary | ICD-10-CM

## 2018-04-09 DIAGNOSIS — Z71.3 DIETARY COUNSELING AND SURVEILLANCE: ICD-10-CM

## 2018-04-09 PROCEDURE — 99213 OFFICE O/P EST LOW 20 MIN: CPT | Performed by: FAMILY MEDICINE

## 2018-04-09 RX ORDER — MELATONIN
2000 DAILY
COMMUNITY
End: 2022-08-08

## 2018-04-09 RX ORDER — OMEPRAZOLE 40 MG/1
40 CAPSULE, DELAYED RELEASE ORAL
COMMUNITY
Start: 2017-03-23 | End: 2022-08-08

## 2018-04-09 ASSESSMENT — ENCOUNTER SYMPTOMS
RESPIRATORY NEGATIVE: 1
EYES NEGATIVE: 1
GASTROINTESTINAL NEGATIVE: 1

## 2018-05-01 LAB
ALBUMIN SERPL-MCNC: 4.3 G/DL (ref 3.4–5)
ALP BLD-CCNC: 45 U/L (ref 40–129)
ALT SERPL-CCNC: 54 U/L (ref 10–40)
AST SERPL-CCNC: 35 U/L (ref 15–37)
BILIRUB SERPL-MCNC: 1.2 MG/DL (ref 0–1)
BILIRUBIN DIRECT: <0.2 MG/DL (ref 0–0.3)
BILIRUBIN, INDIRECT: ABNORMAL MG/DL (ref 0–1)
C-REACTIVE PROTEIN: 2.2 MG/L (ref 0–5.1)
CREAT SERPL-MCNC: 0.6 MG/DL (ref 0.6–1.1)
GFR AFRICAN AMERICAN: >60
GFR NON-AFRICAN AMERICAN: >60
HCT VFR BLD CALC: 42.6 % (ref 36–48)
HEMOGLOBIN: 14 G/DL (ref 12–16)
MCH RBC QN AUTO: 29.5 PG (ref 26–34)
MCHC RBC AUTO-ENTMCNC: 32.9 G/DL (ref 31–36)
MCV RBC AUTO: 89.8 FL (ref 80–100)
PDW BLD-RTO: 14.3 % (ref 12.4–15.4)
PLATELET # BLD: 253 K/UL (ref 135–450)
PMV BLD AUTO: 9.4 FL (ref 5–10.5)
RBC # BLD: 4.74 M/UL (ref 4–5.2)
TOTAL PROTEIN: 6.9 G/DL (ref 6.4–8.2)
WBC # BLD: 5 K/UL (ref 4–11)

## 2018-05-04 ENCOUNTER — OFFICE VISIT (OUTPATIENT)
Dept: RHEUMATOLOGY | Age: 38
End: 2018-05-04

## 2018-05-04 VITALS
WEIGHT: 180.3 LBS | HEART RATE: 98 BPM | SYSTOLIC BLOOD PRESSURE: 130 MMHG | BODY MASS INDEX: 30.47 KG/M2 | DIASTOLIC BLOOD PRESSURE: 88 MMHG

## 2018-05-04 DIAGNOSIS — Z51.81 ENCOUNTER FOR THERAPEUTIC DRUG MONITORING: ICD-10-CM

## 2018-05-04 DIAGNOSIS — Z79.899 HIGH RISK MEDICATION USE: ICD-10-CM

## 2018-05-04 DIAGNOSIS — K50.811 CROHN'S DISEASE OF BOTH SMALL AND LARGE INTESTINE WITH RECTAL BLEEDING (HCC): ICD-10-CM

## 2018-05-04 DIAGNOSIS — M45.7 ANKYLOSING SPONDYLITIS OF LUMBOSACRAL REGION (HCC): Primary | ICD-10-CM

## 2018-05-04 PROBLEM — M26.69 TMJ LOCKING: Status: ACTIVE | Noted: 2018-05-04

## 2018-05-04 PROCEDURE — 99214 OFFICE O/P EST MOD 30 MIN: CPT | Performed by: INTERNAL MEDICINE

## 2018-05-04 RX ORDER — BLOOD SUGAR DIAGNOSTIC
STRIP MISCELLANEOUS
Qty: 60 EACH | Refills: 3 | Status: SHIPPED | OUTPATIENT
Start: 2018-05-04 | End: 2019-07-15 | Stop reason: SDUPTHER

## 2018-05-04 RX ORDER — FOLIC ACID 1 MG/1
1 TABLET ORAL DAILY
Qty: 90 TABLET | Refills: 3 | Status: SHIPPED | OUTPATIENT
Start: 2018-05-04 | End: 2020-11-17

## 2018-05-07 ENCOUNTER — OFFICE VISIT (OUTPATIENT)
Dept: BARIATRICS/WEIGHT MGMT | Age: 38
End: 2018-05-07

## 2018-05-07 VITALS
BODY MASS INDEX: 29.91 KG/M2 | WEIGHT: 179.5 LBS | DIASTOLIC BLOOD PRESSURE: 82 MMHG | HEIGHT: 65 IN | HEART RATE: 96 BPM | SYSTOLIC BLOOD PRESSURE: 124 MMHG

## 2018-05-07 DIAGNOSIS — E66.9 CLASS 1 OBESITY: Primary | ICD-10-CM

## 2018-05-07 DIAGNOSIS — Z71.3 DIETARY COUNSELING AND SURVEILLANCE: ICD-10-CM

## 2018-05-07 PROCEDURE — 99213 OFFICE O/P EST LOW 20 MIN: CPT | Performed by: FAMILY MEDICINE

## 2018-05-07 ASSESSMENT — ENCOUNTER SYMPTOMS
EYES NEGATIVE: 1
RESPIRATORY NEGATIVE: 1
GASTROINTESTINAL NEGATIVE: 1

## 2018-06-08 ENCOUNTER — OFFICE VISIT (OUTPATIENT)
Dept: BARIATRICS/WEIGHT MGMT | Age: 38
End: 2018-06-08

## 2018-06-08 VITALS
SYSTOLIC BLOOD PRESSURE: 130 MMHG | DIASTOLIC BLOOD PRESSURE: 84 MMHG | HEIGHT: 65 IN | BODY MASS INDEX: 29.66 KG/M2 | HEART RATE: 96 BPM | WEIGHT: 178 LBS

## 2018-06-08 DIAGNOSIS — Z71.3 DIETARY COUNSELING AND SURVEILLANCE: ICD-10-CM

## 2018-06-08 DIAGNOSIS — E66.9 CLASS 1 OBESITY: Primary | ICD-10-CM

## 2018-06-08 PROCEDURE — 99213 OFFICE O/P EST LOW 20 MIN: CPT | Performed by: FAMILY MEDICINE

## 2018-06-11 ASSESSMENT — ENCOUNTER SYMPTOMS
EYES NEGATIVE: 1
GASTROINTESTINAL NEGATIVE: 1
RESPIRATORY NEGATIVE: 1

## 2018-07-09 ENCOUNTER — OFFICE VISIT (OUTPATIENT)
Dept: ORTHOPEDIC SURGERY | Age: 38
End: 2018-07-09

## 2018-07-09 VITALS
SYSTOLIC BLOOD PRESSURE: 119 MMHG | DIASTOLIC BLOOD PRESSURE: 81 MMHG | TEMPERATURE: 98 F | HEIGHT: 65 IN | BODY MASS INDEX: 30.49 KG/M2 | HEART RATE: 83 BPM | WEIGHT: 183 LBS

## 2018-07-09 DIAGNOSIS — Z96.641 HISTORY OF TOTAL HIP REPLACEMENT, RIGHT: Primary | ICD-10-CM

## 2018-07-09 PROCEDURE — 99212 OFFICE O/P EST SF 10 MIN: CPT | Performed by: PHYSICIAN ASSISTANT

## 2018-07-11 NOTE — PROGRESS NOTES
Subjective:      Patient ID: Jonathan Clarke is a 40 y.o.  female. Chief Complaint   Patient presents with    Hip Problem     Right YUSEF 8. 1.2014        HPI:  She is here for annual follow up on right hip arthroplasty. This was performed for avascular necrosis of the right hip. The date of procedure(s) 8/1/14. She is having some mild anterior hip pain with hip flexion. Otherwise doing well. Denies any other complaints. Review of Systems:   Negative for fever or chills. Negative for numbness or tingling in the lower extremity.       Past Medical History:   Diagnosis Date    [facility]ylosing spondylitis ([facility] [address]     Arthritis     Crohn's     [place]    Fistula     RECTAL    GERD (gastroesophageal reflux disease)     Iritis 2010    Microcytic anemia     Umbilical hernia        Family History   Problem Relation Age of Onset    Breast Cancer Mother     Cancer Mother     High Blood Pressure Father     High Cholesterol Father     Diabetes Father     Cancer Paternal Aunt         colon    Diabetes Maternal Grandmother     Cancer Maternal Grandmother         colon    Cancer Maternal Grandfather         colon    High Blood Pressure Paternal Grandmother     Cancer Paternal Grandmother         colon       Past Surgical History:   Procedure Laterality Date    ABSCESS DRAINAGE  9-2011    RECTAL    COLONOSCOPY      COLONOSCOPY  11-    COLONOSCOPY  12/2011    JOINT REPLACEMENT Right     HIP    OTHER SURGICAL HISTORY  2007    Terminal ileum resection     RECTAL SURGERY  07/31/2012    RECTAL ADVANCEMENT FLAP    SIGMOIDOSCOPY  9/12/12    FLEXIBLE    SIGMOIDOSCOPY  6/7/2017    flexible sigmoidoscopy    SMALL INTESTINE SURGERY      ileocecectomy    UPPER GASTROINTESTINAL ENDOSCOPY         Social History   Substance Use Topics    Smoking status: Never Smoker    Smokeless tobacco: Never Used    Alcohol use No       Current Outpatient Prescriptions   Medication Sig Dispense Refill    restrictions discussed today. Need for dental prophylactics discussed today. Recommend annual x ray evaluation. Follow Up: 12 months. Call or return to clinic prn if these symptoms worsen or fail to improve as anticipated.

## 2018-07-16 ENCOUNTER — OFFICE VISIT (OUTPATIENT)
Dept: BARIATRICS/WEIGHT MGMT | Age: 38
End: 2018-07-16

## 2018-07-16 VITALS
HEIGHT: 65 IN | SYSTOLIC BLOOD PRESSURE: 120 MMHG | WEIGHT: 177 LBS | BODY MASS INDEX: 29.49 KG/M2 | HEART RATE: 100 BPM | DIASTOLIC BLOOD PRESSURE: 82 MMHG

## 2018-07-16 DIAGNOSIS — Z71.3 DIETARY COUNSELING AND SURVEILLANCE: ICD-10-CM

## 2018-07-16 DIAGNOSIS — E66.3 OVERWEIGHT (BMI 25.0-29.9): Primary | ICD-10-CM

## 2018-07-16 PROCEDURE — 99213 OFFICE O/P EST LOW 20 MIN: CPT | Performed by: FAMILY MEDICINE

## 2018-07-16 ASSESSMENT — ENCOUNTER SYMPTOMS
RESPIRATORY NEGATIVE: 1
GASTROINTESTINAL NEGATIVE: 1
EYES NEGATIVE: 1

## 2018-07-16 NOTE — PATIENT INSTRUCTIONS
changes in your eating habits. Instead of a diet, focus on lifestyle changes that will improve your health and achieve the right balance of energy and calories. To lose weight, you need to burn more calories than you take in. You can do it by eating healthy foods in reasonable amounts and becoming more active, even a little bit every day. Making small changes over time can add up to a lot. Make a plan for change. Many people have found that naming their reasons for change and staying focused on their plan can make a big difference. Work with your doctor to create a plan that is right for you. · Ask yourself: Imelda Ephrata are my personal, most powerful reasons for wanting this change? What will my life look like when I've made the change? \"  · Set your long-term goal. Make it specific, such as \"I will lose x pounds. \"  · Break your long-term goal into smaller, short-term goals. Make these small steps specific and within your reach, things you know you can do. These steps are what keep you going from day to day. How can you stay on your plan for change? Be ready. Choose to start during a time when there are few events that might trigger slip-ups, like holidays, social events, and high-stress periods. Decide on your first few steps. Most people have more success when they make small changes, one step at a time. For example, you might switch a daily candy bar to a piece of fruit, walk 10 minutes more, or add more vegetables to a meal.  Line up your support people. Make sure you're not going to be alone as you make this change. Connect with people who understand how important it is to you. Ask family members and friends for help in keeping with your plan. And think about who could make it harder for you, and how to handle them. Try tracking. People who keep track of what they eat, feel, and do are better at losing weight. Try writing down things like:  · What and how much you eat.   · How you feel before and after each meal.  · Details about each meal (like eating out or at home, eating alone, or with friends or family). · What you do to be active. Look and plan. As you track, look for patterns that you may want to change. Take note of:  · When you eat and whether you skip meals. · How often you eat out. · How many fruits and vegetables you eat. · When you eat beyond feeling full. · When and why you eat for reasons other than being hungry. When you stray from your plan, don't get upset. Figure out what made you slip up and how you can fix it. Can you take medicines or have surgery to lose weight? Before your doctor will prescribe medicines or surgery, he or she will probably want you to be more active and follow your healthy eating plan for a period of time. These habits are key lifelong changes for managing your weight, with or without other medical treatment. And these changes can help you avoid weight-related health problems. Follow-up care is a key part of your treatment and safety. Be sure to make and go to all appointments, and call your doctor if you are having problems. It's also a good idea to know your test results and keep a list of the medicines you take. Where can you learn more? Go to https://[employer]. org and sign in to your [employer] account. Enter N111 in the [place] box to learn more about \"Learning About Obesity. \"     If you do not have an account, please click on the \"Sign Up Now\" link. Current as of: October 9, 2017  Content Version: 11.6  © 2025-0725 [employer]. Care instructions adapted under license by [facility] ([facility]). If you have questions about a medical condition or this instruction, always ask your healthcare professional. Josee Whitmane any warranty or liability for your use of this information.

## 2018-07-16 NOTE — PROGRESS NOTES
Patient: Malcolm Guzman                      Encounter Date: 7/16/2018    YOB: 1980                Age: 40 y.o. Chief Complaint   Patient presents with    Weight Management     8th MWM, 1200 lobo/ LC, Qsymia       /82   Pulse 100   Ht 5' 4.5\" (1.638 m)   Wt 177 lb (80.3 kg)   BMI 29.91 kg/m²     Body mass index is 29.91 kg/m². HPI: 40 y.o. female with a long-standing history of obesity presents today for follow-up. she has lost a pound since her last visit. Current treatment includes Qsymia 7.5-46 mg daily and low carb/lobo diet. Tolerating it well. No side effects except for occasional dry mouth. Food recall reviewed with the patient. Continuing to skip meals due to busy work schedule. Hard to stick with a \"routine. \"     Medication(s): Appetite well controlled? Fair     Focus:     []Good     [x]Fair     []Poor        Side effects? As above         Any recent change in medication(s)? No    Exercise: []Cardio     []Resistance/strength training     [x]Other: None     Allergies   Allergen Reactions    Ciprofloxacin Anaphylaxis    Infliximab Anaphylaxis    Remicade [Infliximab Injection]     Enbrel [Etanercept] Other (See Comments)     Rectal abscess     Humira [Adalimumab] Other (See Comments)     Ineffective         Current Outpatient Prescriptions:     Phentermine-Topiramate (QSYMIA) 7.5-46 MG CP24, Take 1 capsule by mouth daily. ., Disp: , Rfl:     certolizumab pegol (CIMZIA) 2 X 200 MG KIT injection, Inject cimzia prefilled syringe  200mg sc every 14 days, Disp: 3 kit, Rfl: 4    folic acid (FOLVITE) 1 MG tablet, Take 1 tablet by mouth daily, Disp: 90 tablet, Rfl: 3    Insulin Syringe-Needle U-100 31G X 5/16\" 1 ML MISC, To use with twice a week with methotrexate subcutaneous, Disp: 60 each, Rfl: 3    omeprazole (PRILOSEC) 40 MG delayed release capsule, Take 40 mg by mouth, Disp: , Rfl:     Cholecalciferol (VITAMIN D3) 1000 units TABS, Take 2,000 Units by mouth daily, Disp: , Rfl:     methotrexate Sodium (RHEUMATREX) 50 MG/2ML chemo injection, Inject 1 mL into the skin once a week Subcutaneously, Disp: 4 vial, Rfl: 11    EPINEPHrine (EPIPEN) 0.3 MG/0.3ML LOBO injection, Inject 0.3 mLs into the muscle once as needed for 1 dose., Disp: 1 Device, Rfl: 3    Patient Active Problem List   Diagnosis    Encounter for antineoplastic chemotherapy    [facility]ylosing spondylitis ([facility] [address]    Thoracic or lumbosacral neuritis or radiculitis, unspecified    Salmonella infection    Aseptic necrosis of bone (HCC)    Pain in joint, pelvic region and thigh    Crohn's disease ([address]    Acute gastritis    Loss of weight    Abdominal pain, epigastric    Depressive disorder, not elsewhere classified    Pleurisy    Other and unspecified hyperlipidemia    Other acne    Other specified nutritional anemias    Routine general medical examination at a health care [facility] and fatigue    Other symptoms involving digestive system(787.99)    Abnormal screening cardiac CT    General medical examination    Other general medical examination for administrative purposes    Encounter for long-term (current) use of other medications    Need for influenza vaccination    Tachycardia    Ulcerative colitis ([address]    Perirectal fistula    Primary localized osteoarthrosis, pelvic region and thigh    History of total hip replacement, right    Acute pharyngitis    High risk medication use    Crohn's disease of both small and large intestine with rectal bleeding ([address]    Ankylosing spondylitis of lumbosacral region ([address]    Chronic pain    Encounter for therapeutic drug monitoring    TMJ locking       Review of Systems   HENT: Negative. Eyes: Negative. Respiratory: Negative. Cardiovascular: Negative. Gastrointestinal: Negative. Endocrine: Negative. Musculoskeletal: Negative. Neurological: Negative. Psychiatric/Behavioral: Negative.         Physical Exam Constitutional: She is oriented to person, place, and time. She appears well-developed and well-nourished. Cardiovascular: Normal rate, regular rhythm and normal heart sounds. Exam reveals no gallop and no friction rub. No murmur heard. Pulmonary/Chest: Effort normal and breath sounds normal. No respiratory distress. She has no wheezes. She has no rales. Neurological: She is alert and oriented to person, place, and time. Skin: Skin is warm and dry. Psychiatric: She has a normal mood and affect. Her behavior is normal. Judgment and thought content normal.       Orders Only on 05/01/2018   Component Date Value Ref Range Status    WBC 05/01/2018 5.0  4.0 - 11.0 K/uL Final    RBC 05/01/2018 4.74  4.00 - 5.20 M/uL Final    Hemoglobin 05/01/2018 14.0  12.0 - 16.0 g/dL Final    Hematocrit 05/01/2018 42.6  36.0 - 48.0 % Final    MCV 05/01/2018 89.8  80.0 - 100.0 fL Final    MCH 05/01/2018 29.5  26.0 - 34.0 pg Final    MCHC 05/01/2018 32.9  31.0 - 36.0 g/dL Final    RDW 05/01/2018 14.3  12.4 - 15.4 % Final    Platelets 71/37/0510 253  135 - 450 K/uL Final    MPV 05/01/2018 9.4  5.0 - 10.5 fL Final    CRP 05/01/2018 2.2  0.0 - 5.1 mg/L Final    Comment: WR-CRP Reference Range:  0D-29D      <0.6  30D-199Y    <5.1    CRP is used in the detection and evaluation of infection, tissue injury,  inflammatory disorders and associated diseases. Increases in CRP values  are non-specific and should not be interpreted without a complete  clinical evaluation.       Total Protein 05/01/2018 6.9  6.4 - 8.2 g/dL Final    Alb 05/01/2018 4.3  3.4 - 5.0 g/dL Final    Alkaline Phosphatase 05/01/2018 45  40 - 129 U/L Final    ALT 05/01/2018 54* 10 - 40 U/L Final    AST 05/01/2018 35  15 - 37 U/L Final    Total Bilirubin 05/01/2018 1.2* 0.0 - 1.0 mg/dL Final    Bilirubin, Direct 05/01/2018 <0.2  0.0 - 0.3 mg/dL Final    Bilirubin, Indirect 05/01/2018 see below  0.0 - 1.0 mg/dL Final    Comment: Indirect Bilirubin Alcohol use  [x] Tobacco use   [x] Drug use- Denies  [x] Importance of exercise and reducing sedentary time  [x] Treatment consent- Patient understands and agrees with the treatment plan   [x] Proper use of medication and side effects  [x] OARRS report      Patient denies any history of cardiovascular disease (e.g., CAD, stroke, arrhythmias, CHF, uncontrolled HTN), seizure disorder, MAOI use within the last 2 weeks, hyperthyroidism, glaucoma, agitated states, history of drug abuse, pregnancy, nursing, known hypersensitivity to the prescribing meds, history of pancreatitis, or personal or family history of thyroid medullary cancer. New 12-week goal: 9 pounds by 7/10/18- Goal not met   Total weight loss: 5 pounds    Key dietary points:    - Meats (preferably organic or grass fed) are great sources of protein and have no carbohydrates. - Recommend coconut, olive, avocado, or almond oils. - When buying dairy, choose regular or full fat options. - Choose vegetables that grow above ground as they are generally lower in carbohydrates and higher in fiber.  - Avoid starches such as bread, rice, potatoes, pasta and all sources of simple sugars (desserts, soda, breakfast cereals). - Choose beverages that are calorie and sugar free. Reminder regarding weight loss medications: You must be seen in office every 2-4 weeks to have your prescriptions refilled. If you are off of your medication for longer than 7 days, you will not be able to restart the medication for at least 6 months. Always call our office to report any side effects. Females, it is your responsibility to obtain negative pregnancy tests each month. No orders of the defined types were placed in this encounter. No Follow-up on file. Greater than 50% of this 20 minute visit was used in direct counseling.     This dictation was performed with a verbal recognition program (Dragon) and all efforts were made to ensure accuracy of this dictation. It is possible that there are still dictated errors within this note. If so, please bring any errors to my attention for correction.

## 2018-08-22 LAB
ALBUMIN SERPL-MCNC: 4.2 G/DL (ref 3.4–5)
ALP BLD-CCNC: 43 U/L (ref 40–129)
ALT SERPL-CCNC: 34 U/L (ref 10–40)
AST SERPL-CCNC: 26 U/L (ref 15–37)
BILIRUB SERPL-MCNC: 1.1 MG/DL (ref 0–1)
BILIRUBIN DIRECT: <0.2 MG/DL (ref 0–0.3)
BILIRUBIN, INDIRECT: ABNORMAL MG/DL (ref 0–1)
C-REACTIVE PROTEIN: 3.9 MG/L (ref 0–5.1)
CREAT SERPL-MCNC: 0.6 MG/DL (ref 0.6–1.1)
GFR AFRICAN AMERICAN: >60
GFR NON-AFRICAN AMERICAN: >60
HCT VFR BLD CALC: 41.3 % (ref 36–48)
HEMOGLOBIN: 13.7 G/DL (ref 12–16)
MCH RBC QN AUTO: 29.3 PG (ref 26–34)
MCHC RBC AUTO-ENTMCNC: 33.2 G/DL (ref 31–36)
MCV RBC AUTO: 88.2 FL (ref 80–100)
PDW BLD-RTO: 13.3 % (ref 12.4–15.4)
PLATELET # BLD: 233 K/UL (ref 135–450)
PMV BLD AUTO: 10 FL (ref 5–10.5)
RBC # BLD: 4.68 M/UL (ref 4–5.2)
TOTAL PROTEIN: 6.8 G/DL (ref 6.4–8.2)
WBC # BLD: 6.9 K/UL (ref 4–11)

## 2018-08-24 ENCOUNTER — OFFICE VISIT (OUTPATIENT)
Dept: RHEUMATOLOGY | Age: 38
End: 2018-08-24

## 2018-08-24 VITALS
BODY MASS INDEX: 30.3 KG/M2 | HEART RATE: 92 BPM | SYSTOLIC BLOOD PRESSURE: 127 MMHG | WEIGHT: 179.3 LBS | DIASTOLIC BLOOD PRESSURE: 78 MMHG

## 2018-08-24 DIAGNOSIS — Z51.81 ENCOUNTER FOR THERAPEUTIC DRUG MONITORING: ICD-10-CM

## 2018-08-24 DIAGNOSIS — M45.7 ANKYLOSING SPONDYLITIS OF LUMBOSACRAL REGION (HCC): Primary | ICD-10-CM

## 2018-08-24 DIAGNOSIS — K50.811 CROHN'S DISEASE OF BOTH SMALL AND LARGE INTESTINE WITH RECTAL BLEEDING (HCC): ICD-10-CM

## 2018-08-24 DIAGNOSIS — Z79.899 HIGH RISK MEDICATION USE: ICD-10-CM

## 2018-08-24 PROCEDURE — 99214 OFFICE O/P EST MOD 30 MIN: CPT | Performed by: INTERNAL MEDICINE

## 2018-08-24 RX ORDER — METHOCARBAMOL 750 MG/1
750 TABLET, FILM COATED ORAL NIGHTLY PRN
Qty: 30 TABLET | Refills: 4 | Status: SHIPPED | OUTPATIENT
Start: 2018-08-24 | End: 2018-09-23

## 2018-08-24 NOTE — PROGRESS NOTES
[facility]) Physicians  [facility]  Rheumatology Progress Note  Edward Trinidad MD            [] [facility]umat[facility]         [x]  [facility] Rheumatology                                      [address]      [phone]                [phone]      Fax: [phone]                 Fax: [phone]           ______________________________________________________________________      Patient Name:  Honey Abrams is a 40 y.o. patient     Returns today for follow-up of her ankylosing spondylitis and her Crohn's disease. Since last seen, she remains on methotrexate 25 mg subcutaneous and monthly Cimzia subcutaneous injection . She is tolerating her medications well without side effects or difficulties . She tells me today that she has not been completely compliant with the methotrexate as it continues to cause her to have fatigue but it has kept her inflammatory arthritis and inflammatory bowel disease incomplete control. She has not required any steroids since her last office visit here or any pain medication. She continues to work actively and has not missed any days of  work secondary to her inflammatory disease. she continues to lead an active life working long hours and traveling on the weekends. Recent laboratory studies that were done were reviewed. Past medical/surgical history, medications and allergies are reviewed and updated as appropriate.     Allergies   Allergen Reactions    Ciprofloxacin Anaphylaxis    Infliximab Anaphylaxis    Remicade [Infliximab Injection]     Enbrel [Etanercept] Other (See Comments)     Rectal abscess     Humira [Adalimumab] Other (See Comments)     Ineffective       Past Medical History:        Diagnosis Date    Ankylosing spondylitis ([facility] [address]     Arthritis     Crohn's [facility]    Fistula     RECTAL    GERD (gastroesophageal reflux disease)     Iritis 2010    Microcytic anemia     Umbilical hernia        Past Surgical History:        Procedure Laterality Date    ABSCESS DRAINAGE  9-2011    RECTAL    COLONOSCOPY      COLONOSCOPY  11-    COLONOSCOPY  12/2011    JOINT REPLACEMENT Right     HIP    OTHER SURGICAL HISTORY  2007    Terminal ileum resection     RECTAL SURGERY  07/31/2012    RECTAL ADVANCEMENT FLAP    SIGMOIDOSCOPY  9/12/12    FLEXIBLE    SIGMOIDOSCOPY  6/7/2017    flexible sigmoidoscopy    SMALL INTESTINE SURGERY      ileocecectomy    UPPER GASTROINTESTINAL ENDOSCOPY         Prior to Admission medications    Medication Sig Start Date End Date Taking? Authorizing Provider   methocarbamol (ROBAXIN-750) 750 MG tablet Take 1 tablet by mouth nightly as needed (spasm) 8/24/18 9/23/18 Yes Sandip Chauhan MD   certolizumab pegol (CIMZIA) 2 X 200 MG KIT injection Inject cimzia prefilled syringe  200mg sc every 14 days 5/4/18  Yes Sandip Chauhan MD   folic acid (FOLVITE) 1 MG tablet Take 1 tablet by mouth daily 5/4/18  Yes Sandip Chauhan MD   Insulin Syringe-Needle U-100 31G X 5/16\" 1 ML MISC To use with twice a week with methotrexate subcutaneous 5/4/18  Yes Sandip Chauhan MD   omeprazole (PRILOSEC) 40 MG delayed release capsule Take 40 mg by mouth 3/23/17  Yes Historical Provider, MD   Cholecalciferol (VITAMIN D3) 1000 units TABS Take 2,000 Units by mouth daily   Yes Historical Provider, MD   methotrexate Sodium (RHEUMATREX) 50 MG/2ML chemo injection Inject 1 mL into the skin once a week Subcutaneously 2/2/18  Yes Sandip Chauhan MD   EPINEPHrine (EPIPEN) 0.3 MG/0.3ML LOBO injection Inject 0.3 mLs into the muscle once as needed for 1 dose.  1/23/14  Yes Lionel Bray MD           Subjective:      Review of Systems:    GENERAL: denies fatigue ; no unintentional weight loss  HEENT:   No nasal ulcers; no oral ulcers and sores  LUNGS: No shortness of breathing or breathing difficulties  GI: No GI upset from medications  MUSCULOSKELETAL:see HPI  SKIN:     Denies having any new skin lesions  LYMPHADENOPATHY:     Denies having any recent infectious illness    Objective:     Physical Exam:    /78   Pulse 92   Wt 179 lb 4.8 oz (81.3 kg)   BMI 30.30 kg/m²     GENERAL:Able to ambulate without any assistance. Able to go from a seated position to a standing position  HEENT: no evidence of any oral ulcers, lesions or sores  LUNGS:clear to auscultation bilaterally, good air intake  CARDIOVASCULAR:regular rate  MUSCULOSKELETAL: No evidence of any synovitis, swelling, warmth, or limitation of motion of patient's upper or lower peripheral joints. No tenderness over the axial spine to light palpation. There are no  tender fibromyalgia points. Strength is  5/5  in both upper and lower extremities. Skin:Skin is warm and dry. No Petechiae, telangiectasia, purpura, or rash. Palpation of the skin and subcutaneous tissues reveals no evidence of induration, subcutaneous nodule or  tenderness.    Lymphadenopathy: no evidence of any palpable cervical or supraclavicular lymph nodes       DATA:     Labs:     Lab Results   Component Value Date    WBC 6.9 08/22/2018    HGB 13.7 08/22/2018    HCT 41.3 08/22/2018    MCV 88.2 08/22/2018     08/22/2018       Chemistry        Component Value Date/Time     04/07/2017 1027    K 4.6 04/07/2017 1027     04/07/2017 1027    CO2 26 04/07/2017 1027    BUN 10 04/07/2017 1027    CREATININE 0.6 08/22/2018 1146        Component Value Date/Time    CALCIUM 9.7 04/07/2017 1027    ALKPHOS 43 08/22/2018 1146    AST 26 08/22/2018 1146    ALT 34 08/22/2018 1146    BILITOT 1.1 (H) 08/22/2018 1146          Lab Results   Component Value Date    SEDRATE 15 04/07/2017     No results found for: CHRIS, KEVIN, SSA, SSB, C3, C4  No results found for: HAV, HEPAIGM, HEPBIGM, HEPBCAB, HBEAG, HEPCAB  No results found for: CHRIS, ANATITER, ANAINT, PATH  No results 11/24/2018). The risks and benefits of my recommendations, as well as other treatment options, benefits and side effects were discussed with the patient today. Questions were answered. [address] [facility]  [facility] and Rheumatology  [address] [place] [address] [address]:[phone]  [facility]:[phone]    NOTE: This report is transcribed by using voice recognition software dragon. Every effort is made to ensure accuracy; however, inadvertent computerized transcription errors may be present.              Clinton Mancini MD, 8/24/2018 5:20 PM

## 2018-10-09 ENCOUNTER — OFFICE VISIT (OUTPATIENT)
Dept: DERMATOLOGY | Age: 38
End: 2018-10-09
Payer: COMMERCIAL

## 2018-10-09 DIAGNOSIS — R21 RASH: ICD-10-CM

## 2018-10-09 DIAGNOSIS — L70.9 ADULT ACNE: Primary | ICD-10-CM

## 2018-10-09 DIAGNOSIS — D22.9 MULTIPLE NEVI: ICD-10-CM

## 2018-10-09 PROCEDURE — 99213 OFFICE O/P EST LOW 20 MIN: CPT | Performed by: DERMATOLOGY

## 2018-10-09 RX ORDER — CLOBETASOL PROPIONATE 0.5 MG/G
CREAM TOPICAL
Qty: 30 G | Refills: 2 | Status: SHIPPED | OUTPATIENT
Start: 2018-10-09 | End: 2020-11-17

## 2018-10-09 NOTE — PROGRESS NOTES
[facility]at[facility]  Rosalva Goddard MD  [phone]      Elizabeth Nolan  1980    40 y.o. female     Date of Visit: 10/9/2018    Chief Complaint: acne, nevi, rash    History of Present Illness:    1. She returns today to follow-up for adult acne-still waxes and wanes. Not currently using any prescription products. Over-the-counter acne washes dry out her skin. 2.  She has several nevi, one on the back recently bled with scratching. 3.  She also complains of intermittently intensely pruritic lesions on the legs. 4.  She also reports a history of painful fissures in the perineum associated with Crohn's disease. She reports much improvement on her current regimen-rarely gets a painful Edgar. Last was one month ago. She tried using Protopic in the past but complained of associated pain and pruritus with application. Ankylosing spondylitis and Crohns - on Cimzia and methotrexate (25 mg weekly). Review of Systems:  Skin: No other rash or skin lesions. Past Medical History, Family History, Surgical History, Medications and Allergies reviewed.     Past Medical History:   Diagnosis Date    Ankylosing spondylitis ([facility] [address]     Arthritis     Crohn's     [place]    Fistula     RECTAL    GERD (gastroesophageal reflux disease)     Iritis 2010    Microcytic anemia     Umbilical hernia      Past Surgical History:   Procedure Laterality Date    ABSCESS DRAINAGE  9-2011    RECTAL    COLONOSCOPY      COLONOSCOPY  11-    COLONOSCOPY  12/2011    JOINT REPLACEMENT Right     HIP    OTHER SURGICAL HISTORY  2007    Terminal ileum resection     RECTAL SURGERY  07/31/2012    RECTAL ADVANCEMENT FLAP    SIGMOIDOSCOPY  9/12/12    FLEXIBLE    SIGMOIDOSCOPY  6/7/2017    flexible sigmoidoscopy    SMALL INTESTINE SURGERY      ileocecectomy    UPPER GASTROINTESTINAL ENDOSCOPY         Allergies   Allergen Reactions    Ciprofloxacin Anaphylaxis    Infliximab Anaphylaxis

## 2018-12-14 ENCOUNTER — OFFICE VISIT (OUTPATIENT)
Dept: RHEUMATOLOGY | Age: 38
End: 2018-12-14
Payer: COMMERCIAL

## 2018-12-14 VITALS
BODY MASS INDEX: 30.93 KG/M2 | DIASTOLIC BLOOD PRESSURE: 76 MMHG | WEIGHT: 183 LBS | SYSTOLIC BLOOD PRESSURE: 125 MMHG | HEART RATE: 85 BPM

## 2018-12-14 DIAGNOSIS — M45.7 ANKYLOSING SPONDYLITIS OF LUMBOSACRAL REGION (HCC): ICD-10-CM

## 2018-12-14 DIAGNOSIS — Z79.899 HIGH RISK MEDICATION USE: ICD-10-CM

## 2018-12-14 DIAGNOSIS — Z51.81 ENCOUNTER FOR THERAPEUTIC DRUG MONITORING: ICD-10-CM

## 2018-12-14 DIAGNOSIS — K50.811 CROHN'S DISEASE OF BOTH SMALL AND LARGE INTESTINE WITH RECTAL BLEEDING (HCC): ICD-10-CM

## 2018-12-14 DIAGNOSIS — M45.7 ANKYLOSING SPONDYLITIS OF LUMBOSACRAL REGION (HCC): Primary | ICD-10-CM

## 2018-12-14 LAB
ALBUMIN SERPL-MCNC: 4.4 G/DL (ref 3.4–5)
ALP BLD-CCNC: 43 U/L (ref 40–129)
ALT SERPL-CCNC: 30 U/L (ref 10–40)
AST SERPL-CCNC: 21 U/L (ref 15–37)
BILIRUB SERPL-MCNC: 0.8 MG/DL (ref 0–1)
BILIRUBIN DIRECT: <0.2 MG/DL (ref 0–0.3)
BILIRUBIN, INDIRECT: NORMAL MG/DL (ref 0–1)
C-REACTIVE PROTEIN: 3.2 MG/L (ref 0–5.1)
CREAT SERPL-MCNC: 0.6 MG/DL (ref 0.6–1.1)
GFR AFRICAN AMERICAN: >60
GFR NON-AFRICAN AMERICAN: >60
HCT VFR BLD CALC: 42 % (ref 36–48)
HEMOGLOBIN: 13.8 G/DL (ref 12–16)
MCH RBC QN AUTO: 29.6 PG (ref 26–34)
MCHC RBC AUTO-ENTMCNC: 32.8 G/DL (ref 31–36)
MCV RBC AUTO: 90.4 FL (ref 80–100)
PDW BLD-RTO: 14.2 % (ref 12.4–15.4)
PLATELET # BLD: 260 K/UL (ref 135–450)
PMV BLD AUTO: 9.4 FL (ref 5–10.5)
RBC # BLD: 4.65 M/UL (ref 4–5.2)
TOTAL PROTEIN: 7 G/DL (ref 6.4–8.2)
WBC # BLD: 7 K/UL (ref 4–11)

## 2018-12-14 PROCEDURE — 99214 OFFICE O/P EST MOD 30 MIN: CPT | Performed by: INTERNAL MEDICINE

## 2018-12-14 NOTE — PROGRESS NOTES
[facility]) Physicians  [facility] [place]  Rheumatology Progress Note  Kinjal Sim MD            [] [place] [facility]         [x]  [facility] Rheumat[facility]                                      [address]      [phone]                [phone]      Fax: [phone]                 Fax: [phone]           ______________________________________________________________________      Patient Name:  Juan Alston is a 45 y.o. patient     Returns today for follow-up of her ankylosing spondylitis and her Crohn's disease. Since last seen, she remains on methotrexate 25 mg subcutaneous and monthly Cimzia subcutaneous injection . She is tolerating her medications well without side effects or difficulties . She has not required any steroids since her last office visit here or any pain medication. She continues to work actively and has not missed any days of  work secondary to her inflammatory disease. Her Crohn's so far has been stable. She tells me that she has been developing some mild discomfort but that symptoms usually resolve and improve. Her severe fistula and vaginal discomfort and rash that she had developed before has not been recurrent. Recent laboratory studies that were done were reviewed. Past medical/surgical history, medications and allergies are reviewed and updated as appropriate.     Allergies   Allergen Reactions    Ciprofloxacin Anaphylaxis    Infliximab Anaphylaxis    Remicade [Infliximab Injection]     Enbrel [Etanercept] Other (See Comments)     Rectal abscess     Humira [Adalimumab] Other (See Comments)     Ineffective       Past Medical History:        Diagnosis Date    Ankylosing spondylitis ([facility] [address]     Arthritis     Crohn's     Stotz    Fistula     RECTAL    GERD (gastroesophageal reflux disease)     Iritis 2010    Microcytic anemia     Umbilical hernia        Past Surgical History:        Procedure Laterality Date    ABSCESS DRAINAGE  9-2011    RECTAL    COLONOSCOPY      COLONOSCOPY  11-    COLONOSCOPY  12/2011    JOINT REPLACEMENT Right     HIP    OTHER SURGICAL HISTORY  2007    Terminal ileum resection     RECTAL SURGERY  07/31/2012    RECTAL ADVANCEMENT FLAP    SIGMOIDOSCOPY  9/12/12    FLEXIBLE    SIGMOIDOSCOPY  6/7/2017    flexible sigmoidoscopy    SMALL INTESTINE SURGERY      ileocecectomy    UPPER GASTROINTESTINAL ENDOSCOPY         Prior to Admission medications    Medication Sig Start Date End Date Taking? Authorizing Provider   clobetasol (TEMOVATE) 0.05 % cream Apply to itchy bumps on the legs twice daily until improved. 10/9/18  Yes Shena Reed MD   triamcinolone (KENALOG) 0.1 % ointment Apply to affected areas in the groin twice daily for up to 2 weeks or until improved. 10/9/18  Yes Shena Reed MD   certolizumab pegol (CIMZIA) 2 X 200 MG KIT injection Inject cimzia prefilled syringe  200mg sc every 14 days 5/4/18  Yes Jacob Almanza MD   folic acid (FOLVITE) 1 MG tablet Take 1 tablet by mouth daily 5/4/18  Yes Jacob Almanza MD   Insulin Syringe-Needle U-100 31G X 5/16\" 1 ML MISC To use with twice a week with methotrexate subcutaneous 5/4/18  Yes Jacob Almanza MD   omeprazole (PRILOSEC) 40 MG delayed release capsule Take 40 mg by mouth 3/23/17  Yes Historical Provider, MD   Cholecalciferol (VITAMIN D3) 1000 units TABS Take 2,000 Units by mouth daily   Yes Historical Provider, MD   methotrexate Sodium (RHEUMATREX) 50 MG/2ML chemo injection Inject 1 mL into the skin once a week Subcutaneously 2/2/18  Yes Jacob Almanza MD   EPINEPHrine (EPIPEN) 0.3 MG/0.3ML LOBO injection Inject 0.3 mLs into the muscle once as needed for 1 dose.  1/23/14  Yes Pati Neff MD           Subjective:      Review of Systems:    GENERAL: denies fatigue ; no

## 2019-02-07 DIAGNOSIS — M45.7 ANKYLOSING SPONDYLITIS OF LUMBOSACRAL REGION (HCC): ICD-10-CM

## 2019-02-11 ENCOUNTER — TELEPHONE (OUTPATIENT)
Dept: RHEUMATOLOGY | Age: 39
End: 2019-02-11

## 2019-02-21 DIAGNOSIS — M45.7 ANKYLOSING SPONDYLITIS OF LUMBOSACRAL REGION (HCC): ICD-10-CM

## 2019-02-21 RX ORDER — METHOTREXATE 25 MG/ML
INJECTION, SOLUTION INTRA-ARTERIAL; INTRAMUSCULAR; INTRAVENOUS
Qty: 4 ML | Refills: 0 | Status: SHIPPED | OUTPATIENT
Start: 2019-02-21 | End: 2019-07-12 | Stop reason: SDUPTHER

## 2019-04-19 ENCOUNTER — OFFICE VISIT (OUTPATIENT)
Dept: RHEUMATOLOGY | Age: 39
End: 2019-04-19
Payer: COMMERCIAL

## 2019-04-19 VITALS
SYSTOLIC BLOOD PRESSURE: 126 MMHG | DIASTOLIC BLOOD PRESSURE: 84 MMHG | WEIGHT: 185.2 LBS | HEART RATE: 100 BPM | BODY MASS INDEX: 31.3 KG/M2

## 2019-04-19 DIAGNOSIS — E55.9 VITAMIN D DEFICIENCY: ICD-10-CM

## 2019-04-19 DIAGNOSIS — Z79.899 HIGH RISK MEDICATION USE: ICD-10-CM

## 2019-04-19 DIAGNOSIS — Z51.81 ENCOUNTER FOR THERAPEUTIC DRUG MONITORING: ICD-10-CM

## 2019-04-19 DIAGNOSIS — K50.811 CROHN'S DISEASE OF BOTH SMALL AND LARGE INTESTINE WITH RECTAL BLEEDING (HCC): ICD-10-CM

## 2019-04-19 DIAGNOSIS — M45.7 ANKYLOSING SPONDYLITIS OF LUMBOSACRAL REGION (HCC): Primary | ICD-10-CM

## 2019-04-19 PROCEDURE — 99214 OFFICE O/P EST MOD 30 MIN: CPT | Performed by: INTERNAL MEDICINE

## 2019-04-19 PROCEDURE — G8427 DOCREV CUR MEDS BY ELIG CLIN: HCPCS | Performed by: INTERNAL MEDICINE

## 2019-04-19 PROCEDURE — G8417 CALC BMI ABV UP PARAM F/U: HCPCS | Performed by: INTERNAL MEDICINE

## 2019-04-19 PROCEDURE — 1036F TOBACCO NON-USER: CPT | Performed by: INTERNAL MEDICINE

## 2019-04-19 RX ORDER — ERGOCALCIFEROL 1.25 MG/1
50000 CAPSULE ORAL WEEKLY
Qty: 12 CAPSULE | Refills: 0 | Status: SHIPPED | OUTPATIENT
Start: 2019-04-19 | End: 2019-07-26 | Stop reason: SDUPTHER

## 2019-04-19 RX ORDER — METHOCARBAMOL 500 MG/1
500 TABLET, FILM COATED ORAL 4 TIMES DAILY
Qty: 40 TABLET | Refills: 0 | Status: SHIPPED | OUTPATIENT
Start: 2019-04-19 | End: 2019-04-29

## 2019-04-19 NOTE — PROGRESS NOTES
[facility]) Physicians  [facility] [place]  Rheumatology Progress Note  Jamarcus Pearl MD            [] [place] [facility]         [x]  [facility] Rheumatology                                      [address], [address]      [phone]                [phone]      Fax: [phone]                 Fax: [phone]           ______________________________________________________________________      Patient Name:  Kaela Bocanegra is a 45 y.o. patient     Returns today for follow-up of her ankylosing spondylitis and her Crohn's disease. Since last seen, she remains on methotrexate 25 mg subcutaneous and monthly Cimzia subcutaneous injection . She is tolerating her medications well without side effects or difficulties . She has not required any steroids since her last office visit here or any pain medication. She continues to work actively and has not missed any days of  work secondary to her inflammatory disease. However she tells me that she has been more symptomatic recently. She is having intermittent severe mid thoracic pain. Pain is described as being extremely severe and painful. Taking ibuprofen on a when necessary basis does help her with those symptoms. Her Crohn's so far has been stable but she has been developing diarrhea. .  She tells me that she has been developing some mild discomfort but that symptoms usually resolve and improve. Her severe fistula and vaginal discomfort and rash that she had in the past are not completely recurrent although she does have some discomfort. But she tells me that in general she believes she is stable. Recent laboratory studies that were obtained were reviewed. Recent laboratory studies that were done were reviewed.   Past medical/surgical history, medications and allergies are omeprazole (PRILOSEC) 40 MG delayed release capsule Take 40 mg by mouth 3/23/17  Yes Historical Provider, MD   Cholecalciferol (VITAMIN D3) 1000 units TABS Take 2,000 Units by mouth daily   Yes Historical Provider, MD   EPINEPHrine (EPIPEN) 0.3 MG/0.3ML LOBO injection Inject 0.3 mLs into the muscle once as needed for 1 dose. 1/23/14  Yes Yamile Allison MD           Subjective:      Review of Systems:    GENERAL: denies fatigue ; no unintentional weight loss  HEENT:   No nasal ulcers; no oral ulcers and sores  LUNGS: No shortness of breathing or  breathing difficulties  GI: No GI upset from medications  MUSCULOSKELETAL:see HPI  SKIN:     Denies having any new skin lesions  LYMPHADENOPATHY:     Recent upper respiratory tract infection    Objective:     Physical Exam:    BP (!) 144/98 (Site: Left Upper Arm, Position: Sitting, Cuff Size: Medium Adult)   Pulse 100   Wt 185 lb 3.2 oz (84 kg)   BMI 31.30 kg/m²     GENERAL:Able to ambulate without any assistance. Able to go from a seated position to a standing position  HEENT: no evidence of any oral ulcers, lesions or sores  LUNGS:clear to auscultation bilaterally, good air intake  CARDIOVASCULAR:regular rate  MUSCULOSKELETAL: No evidence of any synovitis, swelling, warmth, or limitation of motion of patient's upper or lower peripheral joints. No tenderness over the axial spine to light palpation. There are no  tender fibromyalgia points. Able to bend over and touch her toes  Strength is  5/5  in both upper and lower extremities. Skin:Skin is warm and dry. No Petechiae, telangiectasia, purpura, or rash. Palpation of the skin and subcutaneous tissues reveals no evidence of induration, subcutaneous nodule or  tenderness.    Lymphadenopathy: no evidence of any palpable cervical or supraclavicular lymph nodes       DATA:     Labs:     Lab Results   Component Value Date    WBC 7.0 12/14/2018    HGB 13.8 12/14/2018    HCT 42.0 12/14/2018    MCV 90.4 12/14/2018    PLT ibuprofen as that may have been contributing to her diarrhea that she's having and worsening of her inflammatory bowel disease.  -     CBC; Future  -     C-Reactive Protein; Future  -     Hepatic Function Panel; Future  -     Creatinine, Serum; Future    High risk medication use  - Will check labs to evaluate for any evidence of drug toxicity or side effects.    -     CBC; Future  -     C-Reactive Protein; Future  -     Hepatic Function Panel; Future  -     Creatinine, Serum; Future    Encounter for therapeutic drug monitoring - Will check labs to evaluate for any evidence of drug toxicity or side effects.    -     CBC; Future  -     C-Reactive Protein; Future  -     Hepatic Function Panel; Future  -     Creatinine, Serum; Future    Vitamin D deficiency- supplementation as indicated  -     vitamin D (ERGOCALCIFEROL) 15200 units CAPS capsule; Take 1 capsule by mouth once a week for 12 doses Then take vit D3 OTC 4,000 units daily with the largest meal of the day       Return in about 3 months (around 7/19/2019). The risks and benefits of my recommendations, as well as other treatment options, benefits and side effects were discussed with the patient today. Questions were answered. [address] [facility]  [facility] [place] and Rheumatology  [address] [place] [address] [address]  QETMI:[phone]  IRJ:[phone]    NOTE: This report is transcribed by using voice recognition software dragon. Every effort is made to ensure accuracy; however, inadvertent computerized transcription errors may be present.                Meghna Us MD, 4/19/2019 2:50 PM

## 2019-07-12 DIAGNOSIS — M45.7 ANKYLOSING SPONDYLITIS OF LUMBOSACRAL REGION (HCC): ICD-10-CM

## 2019-07-12 RX ORDER — METHOTREXATE 25 MG/ML
INJECTION, SOLUTION INTRA-ARTERIAL; INTRAMUSCULAR; INTRAVENOUS
Qty: 4 ML | Refills: 0 | Status: SHIPPED | OUTPATIENT
Start: 2019-07-12 | End: 2019-08-25 | Stop reason: SDUPTHER

## 2019-07-12 NOTE — TELEPHONE ENCOUNTER
Orders Only on 12/14/2018   Component Date Value Ref Range Status    CRP 12/14/2018 3.2  0.0 - 5.1 mg/L Final    Comment: WR-CRP Reference Range:  0D-29D      <0.6  30D-199Y    <5.1    CRP is used in the detection and evaluation of infection, tissue injury,  inflammatory disorders and associated diseases. Increases in CRP values  are non-specific and should not be interpreted without a complete  clinical evaluation.  WBC 12/14/2018 7.0  4.0 - 11.0 K/uL Final    RBC 12/14/2018 4.65  4.00 - 5.20 M/uL Final    Hemoglobin 12/14/2018 13.8  12.0 - 16.0 g/dL Final    Hematocrit 12/14/2018 42.0  36.0 - 48.0 % Final    MCV 12/14/2018 90.4  80.0 - 100.0 fL Final    MCH 12/14/2018 29.6  26.0 - 34.0 pg Final    MCHC 12/14/2018 32.8  31.0 - 36.0 g/dL Final    RDW 12/14/2018 14.2  12.4 - 15.4 % Final    Platelets 11/08/5909 260  135 - 450 K/uL Final    MPV 12/14/2018 9.4  5.0 - 10.5 fL Final    CREATININE 12/14/2018 0.6  0.6 - 1.1 mg/dL Final    GFR Non- 12/14/2018 >60  >60 Final    Comment: >60 mL/min/1.73m2 EGFR, calc. for ages 25 and older using the  MDRD formula (not corrected for weight), is valid for stable  renal function.  GFR  12/14/2018 >60  >60 Final    Comment: Chronic Kidney Disease: less than 60 ml/min/1.73 sq.m. Kidney Failure: less than 15 ml/min/1.73 sq.m. Results valid for patients 18 years and older.  Total Protein 12/14/2018 7.0  6.4 - 8.2 g/dL Final    Alb 12/14/2018 4.4  3.4 - 5.0 g/dL Final    Alkaline Phosphatase 12/14/2018 43  40 - 129 U/L Final    ALT 12/14/2018 30  10 - 40 U/L Final    AST 12/14/2018 21  15 - 37 U/L Final    Total Bilirubin 12/14/2018 0.8  0.0 - 1.0 mg/dL Final    Bilirubin, Direct 12/14/2018 <0.2  0.0 - 0.3 mg/dL Final    Bilirubin, Indirect 12/14/2018 see below  0.0 - 1.0 mg/dL Final    Comment: Indirect Bilirubin cannot be calculated since Total Bilirubin  and/or Direct Bilirubin is below measurable range.

## 2019-07-15 DIAGNOSIS — M45.7 ANKYLOSING SPONDYLITIS OF LUMBOSACRAL REGION (HCC): ICD-10-CM

## 2019-07-15 DIAGNOSIS — Z79.899 HIGH RISK MEDICATION USE: ICD-10-CM

## 2019-07-15 RX ORDER — BLOOD SUGAR DIAGNOSTIC
STRIP MISCELLANEOUS
Qty: 60 EACH | Refills: 1 | Status: SHIPPED | OUTPATIENT
Start: 2019-07-15 | End: 2022-08-08

## 2019-07-19 ENCOUNTER — TELEPHONE (OUTPATIENT)
Dept: INTERNAL MEDICINE CLINIC | Age: 39
End: 2019-07-19

## 2019-07-25 ENCOUNTER — OFFICE VISIT (OUTPATIENT)
Dept: RHEUMATOLOGY | Age: 39
End: 2019-07-25
Payer: COMMERCIAL

## 2019-07-25 VITALS
DIASTOLIC BLOOD PRESSURE: 89 MMHG | HEART RATE: 96 BPM | WEIGHT: 187 LBS | HEIGHT: 65 IN | SYSTOLIC BLOOD PRESSURE: 119 MMHG | BODY MASS INDEX: 31.16 KG/M2

## 2019-07-25 DIAGNOSIS — Z79.899 HIGH RISK MEDICATION USE: ICD-10-CM

## 2019-07-25 DIAGNOSIS — M45.7 ANKYLOSING SPONDYLITIS OF LUMBOSACRAL REGION (HCC): ICD-10-CM

## 2019-07-25 DIAGNOSIS — K50.811 CROHN'S DISEASE OF BOTH SMALL AND LARGE INTESTINE WITH RECTAL BLEEDING (HCC): ICD-10-CM

## 2019-07-25 DIAGNOSIS — E55.9 VITAMIN D DEFICIENCY: ICD-10-CM

## 2019-07-25 DIAGNOSIS — Z51.81 ENCOUNTER FOR THERAPEUTIC DRUG MONITORING: ICD-10-CM

## 2019-07-25 DIAGNOSIS — M45.7 ANKYLOSING SPONDYLITIS OF LUMBOSACRAL REGION (HCC): Primary | ICD-10-CM

## 2019-07-25 LAB
ALBUMIN SERPL-MCNC: 4.4 G/DL (ref 3.4–5)
ALP BLD-CCNC: 48 U/L (ref 40–129)
ALT SERPL-CCNC: 31 U/L (ref 10–40)
AST SERPL-CCNC: 24 U/L (ref 15–37)
BILIRUB SERPL-MCNC: 0.8 MG/DL (ref 0–1)
BILIRUBIN DIRECT: <0.2 MG/DL (ref 0–0.3)
BILIRUBIN, INDIRECT: NORMAL MG/DL (ref 0–1)
C-REACTIVE PROTEIN: 5.6 MG/L (ref 0–5.1)
CREAT SERPL-MCNC: 0.5 MG/DL (ref 0.6–1.1)
GFR AFRICAN AMERICAN: >60
GFR NON-AFRICAN AMERICAN: >60
HCT VFR BLD CALC: 40.6 % (ref 36–48)
HEMOGLOBIN: 13.5 G/DL (ref 12–16)
HEPATITIS B CORE IGM ANTIBODY: NORMAL
HEPATITIS B SURFACE ANTIGEN INTERPRETATION: NORMAL
HEPATITIS C ANTIBODY INTERPRETATION: NORMAL
MCH RBC QN AUTO: 29.6 PG (ref 26–34)
MCHC RBC AUTO-ENTMCNC: 33.4 G/DL (ref 31–36)
MCV RBC AUTO: 88.6 FL (ref 80–100)
PDW BLD-RTO: 13.4 % (ref 12.4–15.4)
PLATELET # BLD: 248 K/UL (ref 135–450)
PMV BLD AUTO: 9.2 FL (ref 5–10.5)
RBC # BLD: 4.58 M/UL (ref 4–5.2)
TOTAL PROTEIN: 7.1 G/DL (ref 6.4–8.2)
VITAMIN D 25-HYDROXY: 22 NG/ML
WBC # BLD: 5.6 K/UL (ref 4–11)

## 2019-07-25 PROCEDURE — 99214 OFFICE O/P EST MOD 30 MIN: CPT | Performed by: INTERNAL MEDICINE

## 2019-07-25 PROCEDURE — G8417 CALC BMI ABV UP PARAM F/U: HCPCS | Performed by: INTERNAL MEDICINE

## 2019-07-25 PROCEDURE — 1036F TOBACCO NON-USER: CPT | Performed by: INTERNAL MEDICINE

## 2019-07-25 PROCEDURE — G8427 DOCREV CUR MEDS BY ELIG CLIN: HCPCS | Performed by: INTERNAL MEDICINE

## 2019-07-25 RX ORDER — PREDNISONE 10 MG/1
20 TABLET ORAL DAILY
Qty: 15 TABLET | Refills: 0 | Status: SHIPPED | OUTPATIENT
Start: 2019-07-25 | End: 2019-09-29 | Stop reason: ALTCHOICE

## 2019-07-25 RX ORDER — PREDNISONE 20 MG/1
40 TABLET ORAL DAILY
Qty: 35 TABLET | Refills: 1 | OUTPATIENT
Start: 2019-07-25

## 2019-07-25 RX ORDER — HYDROCODONE BITARTRATE AND ACETAMINOPHEN 5; 325 MG/1; MG/1
1 TABLET ORAL EVERY 12 HOURS PRN
Qty: 14 TABLET | Refills: 0 | Status: SHIPPED | OUTPATIENT
Start: 2019-07-25 | End: 2019-08-01

## 2019-07-25 NOTE — PROGRESS NOTES
Comments)     Ineffective       Past Medical History:        Diagnosis Date    Ankylosing spondylitis ([facility] [address]     Arthritis     Crohn's     Stotz    Fistula     RECTAL    GERD (gastroesophageal reflux disease)     Iritis 2010    Microcytic anemia     Umbilical hernia        Past Surgical History:        Procedure Laterality Date    ABSCESS DRAINAGE  9-2011    RECTAL    COLONOSCOPY      COLONOSCOPY  11-    COLONOSCOPY  12/2011    JOINT REPLACEMENT Right     HIP    OTHER SURGICAL HISTORY  2007    Terminal ileum resection     RECTAL SURGERY  07/31/2012    RECTAL ADVANCEMENT FLAP    SIGMOIDOSCOPY  9/12/12    FLEXIBLE    SIGMOIDOSCOPY  6/7/2017    flexible sigmoidoscopy    SMALL INTESTINE SURGERY      ileocecectomy    UPPER GASTROINTESTINAL ENDOSCOPY         Prior to Admission medications    Medication Sig Start Date End Date Taking? Authorizing Provider   Insulin Syringe-Needle U-100 31G X 5/16\" 1 ML MISC To use with twice a week with methotrexate subcutaneous 7/15/19  Yes Neetu Ch MD   methotrexate Sodium (RHEUMATREX) 50 MG/2ML chemo injection ADMINISTER 1 ML UNDER THE SKIN 1 TIME A WEEK 7/12/19  Yes Neetu Ch MD   certolizumab pegol (CIMZIA PREFILLED) 2 X 200 MG/ML KIT injection Inject 200 mg into the skin every 14 days 6/7/19  Yes Neetu Ch MD   certolizumab pegol (CIMZIA) 2 X 200 MG KIT injection Inject cimzia prefilled syringe  200mg sc every 14 days 2/7/19  Yes Neetu Ch MD   clobetasol (TEMOVATE) 0.05 % cream Apply to itchy bumps on the legs twice daily until improved. 10/9/18  Yes Prashanth Hardwick MD   triamcinolone (KENALOG) 0.1 % ointment Apply to affected areas in the groin twice daily for up to 2 weeks or until improved.  10/9/18  Yes Prashanth Hardwick MD   folic acid (FOLVITE) 1 MG tablet Take 1 tablet by mouth daily 5/4/18  Yes Neetu Ch MD   omeprazole (PRILOSEC) 40 MG delayed release capsule Take 40 mg by mouth 3/23/17  Yes Historical Provider, MD   Cholecalciferol 12/14/2018     12/14/2018       Chemistry        Component Value Date/Time     04/07/2017 1027    K 4.6 04/07/2017 1027     04/07/2017 1027    CO2 26 04/07/2017 1027    BUN 10 04/07/2017 1027    CREATININE 0.6 12/14/2018 1036        Component Value Date/Time    CALCIUM 9.7 04/07/2017 1027    ALKPHOS 43 12/14/2018 1036    AST 21 12/14/2018 1036    ALT 30 12/14/2018 1036    BILITOT 0.8 12/14/2018 1036          Lab Results   Component Value Date    SEDRATE 15 04/07/2017     No results found for: CHRIS, KEVIN, SSA, SSB, C3, C4  No results found for: HAV, HEPAIGM, HEPBIGM, HEPBCAB, HBEAG, HEPCAB  No results found for: CHRIS, ANATITER, ANAINT, PATH  No results found for: DSDNAG, DSDNAIGGIFA  No results found for: SSAROAB, SSALAAB  No results found for: SMAB, RNPAB  No results found for: CENTABIGG  Lab Results   Component Value Date    SEDRATE 15 04/07/2017     No results found for: C3, C4, ACE  Lab Results   Component Value Date    VITD25 17.5 01/09/2018       No results found. Medications tried//failed:  methotrexate  Stelara  Enbrel  Neurontin  Remicade  Humira      Assessment/Plan:      Assessment/Plan:  Michelle Serra was seen today for follow-up. Diagnoses and all orders for this visit:    Ankylosing spondylitis of lumbosacral region (HCC)-continue with methotrexate 25 mg subcutaneously and Cimzia 200 mg every other week for now. Given her recurrence of her Crohn's disease plan will be to get her approved for Stelara. If she fails the prednisone taper, then Cimzia will be discontinued and she will be restarted on Stelara. Guarded prognosis  -     CBC; Future  -     C-Reactive Protein; Future  -     Hepatic Function Panel; Future  -     Creatinine, Serum; Future  -     Quantiferon, Incubated; Future  -     Hepatitis C Antibody; Future  -     Hepatitis B Surface Antigen; Future  -     Hepatitis B Core Antibody, IgM; Future  -     HYDROcodone-acetaminophen (NORCO) 5-325 MG per tablet;  Take 1 tablet by mouth every 12 hours as needed for Pain for up to 7 days. Intended supply: 30 days    High risk medication use - Will check labs to evaluate for any evidence of drug toxicity or side effects.    -     CBC; Future  -     C-Reactive Protein; Future  -     Hepatic Function Panel; Future  -     Creatinine, Serum; Future    Encounter for therapeutic drug monitoring - Will check labs to evaluate for any evidence of drug toxicity or side effects.    -     CBC; Future  -     C-Reactive Protein; Future  -     Hepatic Function Panel; Future  -     Creatinine, Serum; Future    Crohn's disease of both small and large intestine with rectal bleeding ([address] -advised her to follow-up with Dr. Jimi Martinez. Prednisone taper given. If she continues to be symptomatic, the plan will be to switch her over to IV Stelara for the first dose and then subcutaneous 90 mg every 8 weeks  -     predniSONE (DELTASONE) 10 MG tablet; Take 2 tablets by mouth daily For 7 days then take 1-1/2 tablets daily for 7 days then take 1 tablet daily for 7 days then take half a tablet daily for 7 days then take half a tablet every other day for 7 days then stop    Vitamin D deficiency  -     Vitamin D 25 Hydroxy; Future               Return in about 3 months (around 10/25/2019). The risks and benefits of my recommendations, as well as other treatment options, benefits and side effects were discussed with the patient today. Questions were answered. [address] [facility]  [facility]  [address] [place] [address] Dr Dumont S [address]  GAYWH:[phone]  WKR:[phone]    NOTE: This report is transcribed by using voice recognition software dragon. Every effort is made to ensure accuracy; however, inadvertent computerized transcription errors may be present.                Narda Trujillo MD, 7/25/2019 2:07 PM

## 2019-07-26 ENCOUNTER — TELEPHONE (OUTPATIENT)
Dept: INTERNAL MEDICINE CLINIC | Age: 39
End: 2019-07-26

## 2019-07-26 DIAGNOSIS — E55.9 VITAMIN D DEFICIENCY: ICD-10-CM

## 2019-07-26 RX ORDER — ERGOCALCIFEROL 1.25 MG/1
50000 CAPSULE ORAL
Qty: 12 CAPSULE | Refills: 1 | Status: SHIPPED | OUTPATIENT
Start: 2019-07-26 | End: 2022-08-08

## 2019-07-29 LAB
QUANTI TB GOLD PLUS: NEGATIVE
QUANTI TB1 MINUS NIL: 0 IU/ML (ref 0–0.34)
QUANTI TB2 MINUS NIL: 0 IU/ML (ref 0–0.34)
QUANTIFERON MITOGEN: 8.22 IU/ML
QUANTIFERON NIL: 0.08 IU/ML

## 2019-08-13 ENCOUNTER — TELEPHONE (OUTPATIENT)
Dept: RHEUMATOLOGY | Age: 39
End: 2019-08-13

## 2019-08-13 NOTE — TELEPHONE ENCOUNTER
FYI per the rep today, if there is an appeal and it is also denied by a [employer], the medication will be free for a year and then re evaluated.

## 2019-08-25 DIAGNOSIS — M45.7 ANKYLOSING SPONDYLITIS OF LUMBOSACRAL REGION (HCC): ICD-10-CM

## 2019-08-27 RX ORDER — METHOTREXATE 25 MG/ML
INJECTION, SOLUTION INTRA-ARTERIAL; INTRAMUSCULAR; INTRAVENOUS
Qty: 4 ML | Refills: 2 | Status: SHIPPED | OUTPATIENT
Start: 2019-08-27 | End: 2020-11-17

## 2019-09-29 ENCOUNTER — APPOINTMENT (OUTPATIENT)
Dept: GENERAL RADIOLOGY | Age: 39
End: 2019-09-29
Payer: COMMERCIAL

## 2019-09-29 ENCOUNTER — HOSPITAL ENCOUNTER (EMERGENCY)
Age: 39
Discharge: HOME | End: 2019-09-29
Attending: EMERGENCY MEDICINE
Payer: COMMERCIAL

## 2019-09-29 VITALS
HEART RATE: 79 BPM | WEIGHT: 186 LBS | HEIGHT: 64 IN | RESPIRATION RATE: 18 BRPM | DIASTOLIC BLOOD PRESSURE: 69 MMHG | SYSTOLIC BLOOD PRESSURE: 124 MMHG | OXYGEN SATURATION: 98 % | BODY MASS INDEX: 31.76 KG/M2 | TEMPERATURE: 98.2 F

## 2019-09-29 DIAGNOSIS — R00.2 PALPITATIONS: Primary | ICD-10-CM

## 2019-09-29 DIAGNOSIS — R55 NEAR SYNCOPE: ICD-10-CM

## 2019-09-29 LAB
A/G RATIO: 1.3 (ref 1.1–2.2)
ALBUMIN SERPL-MCNC: 4.5 G/DL (ref 3.4–5)
ALP BLD-CCNC: 46 U/L (ref 40–129)
ALT SERPL-CCNC: 32 U/L (ref 10–40)
ANION GAP SERPL CALCULATED.3IONS-SCNC: 11 MMOL/L (ref 3–16)
AST SERPL-CCNC: 22 U/L (ref 15–37)
BASOPHILS ABSOLUTE: 0 K/UL (ref 0–0.2)
BASOPHILS RELATIVE PERCENT: 0.4 %
BILIRUB SERPL-MCNC: 0.9 MG/DL (ref 0–1)
BUN BLDV-MCNC: 8 MG/DL (ref 7–20)
CALCIUM SERPL-MCNC: 9.4 MG/DL (ref 8.3–10.6)
CHLORIDE BLD-SCNC: 105 MMOL/L (ref 99–110)
CO2: 23 MMOL/L (ref 21–32)
CREAT SERPL-MCNC: 0.6 MG/DL (ref 0.6–1.1)
D DIMER: <200 NG/ML DDU (ref 0–229)
EOSINOPHILS ABSOLUTE: 0.1 K/UL (ref 0–0.6)
EOSINOPHILS RELATIVE PERCENT: 1 %
GFR AFRICAN AMERICAN: >60
GFR NON-AFRICAN AMERICAN: >60
GLOBULIN: 3.4 G/DL
GLUCOSE BLD-MCNC: 102 MG/DL (ref 70–99)
HCG QUALITATIVE: NEGATIVE
HCT VFR BLD CALC: 40.2 % (ref 36–48)
HEMOGLOBIN: 13.5 G/DL (ref 12–16)
LYMPHOCYTES ABSOLUTE: 1.2 K/UL (ref 1–5.1)
LYMPHOCYTES RELATIVE PERCENT: 15.6 %
MCH RBC QN AUTO: 29.7 PG (ref 26–34)
MCHC RBC AUTO-ENTMCNC: 33.7 G/DL (ref 31–36)
MCV RBC AUTO: 88 FL (ref 80–100)
MONOCYTES ABSOLUTE: 0.5 K/UL (ref 0–1.3)
MONOCYTES RELATIVE PERCENT: 6.1 %
NEUTROPHILS ABSOLUTE: 6.1 K/UL (ref 1.7–7.7)
NEUTROPHILS RELATIVE PERCENT: 76.9 %
PDW BLD-RTO: 14.8 % (ref 12.4–15.4)
PLATELET # BLD: 238 K/UL (ref 135–450)
PMV BLD AUTO: 8.6 FL (ref 5–10.5)
POTASSIUM SERPL-SCNC: 3.6 MMOL/L (ref 3.5–5.1)
RBC # BLD: 4.56 M/UL (ref 4–5.2)
SODIUM BLD-SCNC: 139 MMOL/L (ref 136–145)
TOTAL PROTEIN: 7.9 G/DL (ref 6.4–8.2)
TROPONIN: <0.01 NG/ML
TROPONIN: <0.01 NG/ML
WBC # BLD: 8 K/UL (ref 4–11)

## 2019-09-29 PROCEDURE — 80053 COMPREHEN METABOLIC PANEL: CPT

## 2019-09-29 PROCEDURE — 84703 CHORIONIC GONADOTROPIN ASSAY: CPT

## 2019-09-29 PROCEDURE — 93005 ELECTROCARDIOGRAM TRACING: CPT | Performed by: EMERGENCY MEDICINE

## 2019-09-29 PROCEDURE — 36415 COLL VENOUS BLD VENIPUNCTURE: CPT

## 2019-09-29 PROCEDURE — 84484 ASSAY OF TROPONIN QUANT: CPT

## 2019-09-29 PROCEDURE — 99285 EMERGENCY DEPT VISIT HI MDM: CPT

## 2019-09-29 PROCEDURE — 85025 COMPLETE CBC W/AUTO DIFF WBC: CPT

## 2019-09-29 PROCEDURE — 71046 X-RAY EXAM CHEST 2 VIEWS: CPT

## 2019-09-29 PROCEDURE — 85379 FIBRIN DEGRADATION QUANT: CPT

## 2019-09-29 ASSESSMENT — HEART SCORE: ECG: 1

## 2019-09-30 LAB
EKG ATRIAL RATE: 97 BPM
EKG DIAGNOSIS: NORMAL
EKG P AXIS: 26 DEGREES
EKG P-R INTERVAL: 142 MS
EKG Q-T INTERVAL: 412 MS
EKG QRS DURATION: 88 MS
EKG QTC CALCULATION (BAZETT): 523 MS
EKG R AXIS: 1 DEGREES
EKG T AXIS: 1 DEGREES
EKG VENTRICULAR RATE: 97 BPM

## 2019-09-30 PROCEDURE — 93010 ELECTROCARDIOGRAM REPORT: CPT | Performed by: INTERNAL MEDICINE

## 2020-03-16 ENCOUNTER — OFFICE VISIT (OUTPATIENT)
Dept: ORTHOPEDIC SURGERY | Age: 40
End: 2020-03-16
Payer: COMMERCIAL

## 2020-03-16 VITALS — TEMPERATURE: 97.8 F | BODY MASS INDEX: 31.93 KG/M2 | HEIGHT: 64 IN

## 2020-03-16 PROCEDURE — G8427 DOCREV CUR MEDS BY ELIG CLIN: HCPCS | Performed by: ORTHOPAEDIC SURGERY

## 2020-03-16 PROCEDURE — G8417 CALC BMI ABV UP PARAM F/U: HCPCS | Performed by: ORTHOPAEDIC SURGERY

## 2020-03-16 PROCEDURE — 99213 OFFICE O/P EST LOW 20 MIN: CPT | Performed by: ORTHOPAEDIC SURGERY

## 2020-03-16 PROCEDURE — 1036F TOBACCO NON-USER: CPT | Performed by: ORTHOPAEDIC SURGERY

## 2020-03-16 PROCEDURE — G8484 FLU IMMUNIZE NO ADMIN: HCPCS | Performed by: ORTHOPAEDIC SURGERY

## 2020-03-16 NOTE — PROGRESS NOTES
This dictation was done with [employer] dictation and may contain mechanical errors related to translation. Temperature 97.8 °F (36.6 °C), temperature source Temporal, height 5' 4\" (1.626 m), not currently breastfeeding.

## 2020-03-17 ENCOUNTER — OFFICE VISIT (OUTPATIENT)
Dept: ORTHOPEDIC SURGERY | Age: 40
End: 2020-03-17
Payer: COMMERCIAL

## 2020-03-17 VITALS — HEIGHT: 64 IN | WEIGHT: 186 LBS | BODY MASS INDEX: 31.76 KG/M2

## 2020-03-17 PROCEDURE — 99999 PR OFFICE/OUTPT VISIT,PROCEDURE ONLY: CPT | Performed by: ORTHOPAEDIC SURGERY

## 2020-03-17 PROCEDURE — 20611 DRAIN/INJ JOINT/BURSA W/US: CPT | Performed by: ORTHOPAEDIC SURGERY

## 2020-03-17 RX ORDER — METHYLPREDNISOLONE ACETATE 40 MG/ML
80 INJECTION, SUSPENSION INTRA-ARTICULAR; INTRALESIONAL; INTRAMUSCULAR; SOFT TISSUE ONCE
Status: COMPLETED | OUTPATIENT
Start: 2020-03-17 | End: 2020-03-17

## 2020-03-17 RX ORDER — LIDOCAINE HYDROCHLORIDE 10 MG/ML
5 INJECTION, SOLUTION INFILTRATION; PERINEURAL ONCE
Status: COMPLETED | OUTPATIENT
Start: 2020-03-17 | End: 2020-03-17

## 2020-03-17 RX ADMIN — METHYLPREDNISOLONE ACETATE 80 MG: 40 INJECTION, SUSPENSION INTRA-ARTICULAR; INTRALESIONAL; INTRAMUSCULAR; SOFT TISSUE at 09:19

## 2020-03-17 RX ADMIN — LIDOCAINE HYDROCHLORIDE 5 ML: 10 INJECTION, SOLUTION INFILTRATION; PERINEURAL at 09:18

## 2020-03-19 NOTE — PROGRESS NOTES
Patient is a 70-year-old female who was status post right total hip arthroplasty performed 5 and half years ago on 8/1/2014. She has precocious arthritis and at the very young age of 28 she underwent successful uncomplicated uncemented direct anterior total hip arthroplasty. She is doing well without any pain in her right hip. She now presents with new increasing left hip pain. There is been no new history of injury or surgery however her pain has increased dramatically now giving out on her and causing her to have pain rating it up to 8 out of 10. She is here for further evaluation and follow-up. Patient Active Problem List   Diagnosis    Ankylosing spondylitis ([address]    Thoracic or lumbosacral neuritis or radiculitis, unspecified    Salmonella infection    Aseptic necrosis of bone ([address]    Pain in joint, pelvic region and thigh    Crohn's disease ([address]    Acute gastritis    Loss of weight    Abdominal pain, epigastric    Depressive disorder, not elsewhere classified    Pleurisy    Other and unspecified hyperlipidemia    Other acne    Other specified nutritional anemias    Malaise and fatigue    Other symptoms involving digestive system(787.99)    Abnormal screening cardiac CT    Other general medical examination for administrative purposes    Encounter for long-term (current) use of other medications    Tachycardia    Ulcerative colitis ([address]    Perirectal fistula    Primary localized osteoarthrosis, pelvic region and thigh    History of total hip replacement, right    Acute pharyngitis    High risk medication use    Crohn's disease of both small and large intestine with rectal bleeding ([address]    Ankylosing spondylitis of lumbosacral region ([address]    Chronic pain    Encounter for therapeutic drug monitoring    TMJ locking     Prior to Visit Medications    Medication Sig Taking?  Authorizing Provider   methotrexate Sodium (RHEUMATREX) 50 MG/2ML chemo injection ADMINISTER 1 ML UNDER THE SKIN 1 TIME A WEEK  Solis Mendoza MD   vitamin D (ERGOCALCIFEROL) 62513 units CAPS capsule Take 1 capsule by mouth every 14 days Then take vit D3 OTC 4,000 units daily with the largest meal of the day  Solis Mendoza MD   Insulin Syringe-Needle U-100 31G X 5/16\" 1 ML MISC To use with twice a week with methotrexate subcutaneous  Solis Mendoza MD   certolizumab pegol (CIMZIA PREFILLED) 2 X 200 MG/ML KIT injection Inject 200 mg into the skin every 14 days  Solis Mendoza MD   certolizumab pegol (CIMZIA) 2 X 200 MG KIT injection Inject cimzia prefilled syringe  200mg sc every 14 days  Solis Mendoza MD   clobetasol (TEMOVATE) 0.05 % cream Apply to itchy bumps on the legs twice daily until improved. Charmayne Sinclair, MD   folic acid (FOLVITE) 1 MG tablet Take 1 tablet by mouth daily  Solis Mendoza MD   omeprazole (PRILOSEC) 40 MG delayed release capsule Take 40 mg by mouth  Historical Provider, MD   Cholecalciferol (VITAMIN D3) 1000 units TABS Take 2,000 Units by mouth daily  Historical Provider, MD   EPINEPHrine (EPIPEN) 0.3 MG/0.3ML LOBO injection Inject 0.3 mLs into the muscle once as needed for 1 dose. Evelia Singletary MD     Physical examination 51-year-old female oriented x3 temperature is 97.8. Examination of her back shows good range of motion no specific pain tenderness or instability. Motor exam to the left and right lower extremity shows quadriceps hamstrings hip abductors and abductors foot plantar dorsiflexors are intact 4-5 over 5. Sensation and perfusion are intact to both right and left lower extremity. There is no numbness or tingling noted in either right or left lower extremity. Examination of the right hip shows a healed anterior scar good range of motion no signs of instability or deep sepsis of the right hip joint. Examination of the left hip shows significant pain with high degrees of flexion and internal rotation but no signs of instability or deep sepsis.     X-rays obtained AP pelvis AP lateral both right and left hips shows first right uncemented total hip arthroplasty. Stable overall orientation and alignment with no signs of obvious loosening failure subsidence or instability. X-rays of her contralateral left hip shows advancing degenerative osteoarthritis of the acetabular femoral joint. Osteophytic spur narrowing of the acetabular femoral joint space  osteolytic changes representing intraosseous cysts and femoral head osteophyte noted. No other obvious fractures tumors or dislocations are noted on these x-rays. Impression 71-year-old female stable 4-1/2 years status post uncemented right total hip arthroplasty with left hip arthritis. Plan we had a 15-minute face-to-face discussion with this patient of which greater than 50% of time was spent on counseling her about further care and treatment of her right and left hips. As far as her right hip is concerned I think yearly follow-up especially in this very young individual is pretty much mandatory. As far as her left hip is is concerned she will probably eventually need to undergo left hip arthroplasty however we will start with an intra-articular cortisone injection. We will have Dr. Les Saavedra see her and inject her left hip. Follow-up at that time.

## 2020-08-26 PROCEDURE — 0296T PR EXT ECG > 48HR TO 21 DAY RCRD W/CONECT INTL RCRD: CPT | Performed by: INTERNAL MEDICINE

## 2020-09-08 PROCEDURE — 0298T PR EXT ECG > 48HR TO 21 DAY REVIEW AND INTERPRETATN: CPT | Performed by: INTERNAL MEDICINE

## 2020-11-17 ENCOUNTER — OFFICE VISIT (OUTPATIENT)
Dept: SURGERY | Age: 40
End: 2020-11-17
Payer: COMMERCIAL

## 2020-11-17 VITALS
WEIGHT: 190 LBS | DIASTOLIC BLOOD PRESSURE: 92 MMHG | OXYGEN SATURATION: 97 % | TEMPERATURE: 98.1 F | HEART RATE: 107 BPM | HEIGHT: 65 IN | SYSTOLIC BLOOD PRESSURE: 145 MMHG | BODY MASS INDEX: 31.65 KG/M2

## 2020-11-17 PROCEDURE — 1036F TOBACCO NON-USER: CPT | Performed by: SURGERY

## 2020-11-17 PROCEDURE — G8427 DOCREV CUR MEDS BY ELIG CLIN: HCPCS | Performed by: SURGERY

## 2020-11-17 PROCEDURE — 99203 OFFICE O/P NEW LOW 30 MIN: CPT | Performed by: SURGERY

## 2020-11-17 PROCEDURE — G8484 FLU IMMUNIZE NO ADMIN: HCPCS | Performed by: SURGERY

## 2020-11-17 PROCEDURE — G8417 CALC BMI ABV UP PARAM F/U: HCPCS | Performed by: SURGERY

## 2020-11-17 RX ORDER — METRONIDAZOLE 500 MG/1
500 TABLET ORAL 3 TIMES DAILY
COMMUNITY
End: 2022-08-08

## 2020-11-17 RX ORDER — MERCAPTOPURINE 50 MG/1
100 TABLET ORAL DAILY
COMMUNITY
End: 2022-08-08

## 2020-11-17 NOTE — PROGRESS NOTES
[address] [address] [address]  Dept: [phone]  Dept Fax: [phone]  Loc: [phone]    Visit Date: 11/17/2020    Nia Gar is a 36 y.o. female who presents today for: New Patient (Anal fissure)      HPI:       Nia Gar is a 36 y.o. female referred to me by Dr. Molly Gomez of GI for further evaluation regarding anorectal Crohn's disease. Christopher Ring is a very nice 59-year-old female with longstanding history of Crohn's disease. Her symptoms started in the early 2000's when she had terminal ileal disease and underwent ileocecectomy. She shortly after then started having rectovaginal symptoms including chronic abscess in the rectovaginal space as well as perineal abscess. 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 She underwent prolonged seton drainage and eventually rectovaginal fistula repair. She has been on multiple medical therapies over the last several years. She continues to have intermittent discomfort in the perineum and swelling. In 2017 she underwent EUA with Remedios Hassan at [facility] colorectal and was not found to have any recurrence of her rectovaginal fistula. Dr. Watson Signs recently started her on Flagyl and she is actually been having improving symptoms over the last 3 to 4 days since restarting Flagyl. She denies fever, chills, nausea, vomiting, food tolerance, weight loss, abdominal pain. Patient's problem list, medications, past medical, surgical, family, and social histories were reviewed and updated in the chart as indicated today.     Past Medical History:   Diagnosis Date    Ankylosing spondylitis ([address]     Arthritis     Crohn's     Stotz    Fistula     RECTAL    GERD (gastroesophageal reflux disease)     Iritis 2010    Microcytic anemia     Umbilical hernia        Past Surgical History:   Procedure Laterality Date    ABSCESS DRAINAGE  9-2011    RECTAL    COLONOSCOPY      COLONOSCOPY  11-    COLONOSCOPY  12/2011    JOINT REPLACEMENT Right     HIP    OTHER SURGICAL HISTORY  2007    Terminal ileum resection     RECTAL SURGERY  07/31/2012    RECTAL ADVANCEMENT FLAP    SIGMOIDOSCOPY  9/12/12    FLEXIBLE    SIGMOIDOSCOPY  6/7/2017    flexible sigmoidoscopy    SMALL INTESTINE SURGERY      ileocecectomy    UPPER GASTROINTESTINAL ENDOSCOPY         Cancer-related family history includes Breast Cancer in her mother; Cancer in her maternal grandfather, maternal grandmother, mother, paternal aunt, and paternal grandmother. Social History:   Social History     Tobacco Use    Smoking status: Never Smoker    Smokeless tobacco: Never Used   Substance Use Topics    Alcohol use: No      Tobacco cessation counseling provided as appropriate. REVIEW OF SYSTEMS:    Pertinent positives and negatives are mentioned in the HPI above. Otherwise, all other systems were reviewed and negative. Objective:     Physical Exam   BP (!) 145/92   Pulse 107   Temp 98.1 °F (36.7 °C) (Infrared)   Ht 5' 4.5\" (1.638 m)   Wt 190 lb (86.2 kg)   SpO2 97%   BMI 32.11 kg/m²   Constitutional: Appears well-developed and well-nourished. Grooming appropriate. No gross deformities. Body mass index is 32.11 kg/m². Eyes: No scleral icterus. Conjunctiva/lids normal. Vision intact grossly. Pupils equal/symmetric, reactive bilaterally. ENT: External ears/nose without defect, scars, or masses. Hearing grossly intact. No facial deformity. Lips normal, normal dentition. Neck: No masses. Trachea midline. No crepitus. Thyroid not enlarged. Cardiovascular: Normal rate. No peripheral edema. Abdominal aorta normal size to palpation. Pulmonary/Chest: Effort normal. No respiratory distress. No wheezes. No use of accessory muscles.   Musculoskeletal: Normal range of motion x all 4 extremities and head/neck, without deformity, pain, or crepitus, with normal strength and tone. Normal gait. Nails without clubbing or cyanosis. Neurological: Alert and oriented to person, place, and time. No gross deficits. Sensation intact. Skin: Skin is dry. No rashes noted. No pallor. No induration of nodules. Psychiatric: Normal mood and affect. Behavior normal. Oriented to person, place, and time. Judgment and insight reasonable. Abdominal/wound: Soft, obese, nontender    RECTAL EXAM:    Chaperone/MA present in room during entire exam. Patient was placed in knee-chest position or left side down lateral position depending on preference. Exam table manipulated for proper visualization and lighting. Buttocks spread. Inspection reveals: Thickening of perineum, no evidence of abscess or drainage. Previously healed fissures in various areas    Digital exam and anoscopy deferred due to acute pain      Labs reviewed: none  Radiology reviewed: MRI pevlis 6/2016    Last colonoscopy: Charbel Arboleda  Outside hospital [facility] as well as , including operative notes. Coordination and discussion with Dr. Rodney Littlejohn    Assessment/Plan:     A/P:  New problem(s): Anorectal Crohn's disease with history of rectovaginal fistula  Established problem(s): Ankylosing spondylitis  Additional workup/treatment planned: Continue with medical management of anal Crohn's  Risk of complications/morbidity: High    Unfortunately, Layne Chun has quite severe Crohn's disease of the anal rectum. She had had a previous rectovaginal fistula and underwent repair over 10 years ago. Today on exam she does have some thickening of the perineum and other Crohn's-like features such as waxy skin tags and previously healed abnormal fissures, but no definitive evidence of recurrent fistula. She is too tender for anoscopy, however. Fortunately, she seems to be responding to Flagyl as well as her biologic medications, so I discussed continuing this for now.   We also discussed potentially low-dose

## 2020-11-17 NOTE — Clinical Note
Tough case. Like we talked about last night, left just continue with Flagyl and her Biologics. If symptoms worsen we could always plan for EUA or MRI. Thanks!   Jose Whalen

## 2020-12-23 ENCOUNTER — HOSPITAL ENCOUNTER (OUTPATIENT)
Age: 40
Discharge: HOME | End: 2020-12-23
Payer: COMMERCIAL

## 2020-12-23 LAB — SARS-COV-2, NAAT: DETECTED

## 2020-12-23 PROCEDURE — U0003 INFECTIOUS AGENT DETECTION BY NUCLEIC ACID (DNA OR RNA); SEVERE ACUTE RESPIRATORY SYNDROME CORONAVIRUS 2 (SARS-COV-2) (CORONAVIRUS DISEASE [COVID-19]), AMPLIFIED PROBE TECHNIQUE, MAKING USE OF HIGH THROUGHPUT TECHNOLOGIES AS DESCRIBED BY CMS-2020-01-R: HCPCS

## 2020-12-23 PROCEDURE — U0002 COVID-19 LAB TEST NON-CDC: HCPCS

## 2020-12-24 LAB — SARS-COV-2: DETECTED

## 2021-01-07 ENCOUNTER — HOSPITAL ENCOUNTER (OUTPATIENT)
Dept: MAMMOGRAPHY | Age: 41
Discharge: HOME | End: 2021-01-07
Payer: COMMERCIAL

## 2021-01-07 ENCOUNTER — HOSPITAL ENCOUNTER (OUTPATIENT)
Dept: ULTRASOUND IMAGING | Age: 41
Discharge: HOME | End: 2021-01-07
Payer: COMMERCIAL

## 2021-01-07 DIAGNOSIS — Z12.31 VISIT FOR SCREENING MAMMOGRAM: ICD-10-CM

## 2021-01-07 DIAGNOSIS — N64.4 BREAST PAIN: ICD-10-CM

## 2021-01-07 PROCEDURE — 76642 ULTRASOUND BREAST LIMITED: CPT

## 2021-01-07 PROCEDURE — 77063 BREAST TOMOSYNTHESIS BI: CPT

## 2021-01-15 ENCOUNTER — OFFICE VISIT (OUTPATIENT)
Dept: ENT CLINIC | Age: 41
End: 2021-01-15
Payer: COMMERCIAL

## 2021-01-15 VITALS
WEIGHT: 188 LBS | HEART RATE: 97 BPM | DIASTOLIC BLOOD PRESSURE: 91 MMHG | SYSTOLIC BLOOD PRESSURE: 141 MMHG | TEMPERATURE: 97.8 F | BODY MASS INDEX: 31.77 KG/M2

## 2021-01-15 DIAGNOSIS — R13.10 DYSPHAGIA, UNSPECIFIED TYPE: Primary | ICD-10-CM

## 2021-01-15 DIAGNOSIS — R09.89 GLOBUS PHARYNGEUS: ICD-10-CM

## 2021-01-15 DIAGNOSIS — F41.9 ANXIETY: ICD-10-CM

## 2021-01-15 DIAGNOSIS — K21.9 GASTROESOPHAGEAL REFLUX DISEASE, UNSPECIFIED WHETHER ESOPHAGITIS PRESENT: ICD-10-CM

## 2021-01-15 PROCEDURE — G8427 DOCREV CUR MEDS BY ELIG CLIN: HCPCS | Performed by: STUDENT IN AN ORGANIZED HEALTH CARE EDUCATION/TRAINING PROGRAM

## 2021-01-15 PROCEDURE — 1036F TOBACCO NON-USER: CPT | Performed by: STUDENT IN AN ORGANIZED HEALTH CARE EDUCATION/TRAINING PROGRAM

## 2021-01-15 PROCEDURE — 99203 OFFICE O/P NEW LOW 30 MIN: CPT | Performed by: STUDENT IN AN ORGANIZED HEALTH CARE EDUCATION/TRAINING PROGRAM

## 2021-01-15 PROCEDURE — G8484 FLU IMMUNIZE NO ADMIN: HCPCS | Performed by: STUDENT IN AN ORGANIZED HEALTH CARE EDUCATION/TRAINING PROGRAM

## 2021-01-15 PROCEDURE — 31575 DIAGNOSTIC LARYNGOSCOPY: CPT | Performed by: STUDENT IN AN ORGANIZED HEALTH CARE EDUCATION/TRAINING PROGRAM

## 2021-01-15 PROCEDURE — G8417 CALC BMI ABV UP PARAM F/U: HCPCS | Performed by: STUDENT IN AN ORGANIZED HEALTH CARE EDUCATION/TRAINING PROGRAM

## 2021-01-15 RX ORDER — PANTOPRAZOLE SODIUM 40 MG/1
TABLET, DELAYED RELEASE ORAL
COMMUNITY
Start: 2020-11-24 | End: 2022-08-08

## 2021-01-15 NOTE — PROGRESS NOTES
[address] [facility] SURGERY  NEW PATIENT HISTORY AND PHYSICAL NOTE      Patient Name: Jimy Ramirez Record Number:  [id number]  Primary Care Physician:  Julio César Bella MD    ChiefComplaint     Chief Complaint   Patient presents with    Pharyngitis     difficulty swallowing pills, feels a fullness in throat    Dizziness     occassional        History of Present Illness     Samina Saenz is an 36 y.o. female presenting with fullness in throat and difficulty swallowing. Swallowing issues mainly with solid foods and pills, no issues with liquids. Denies regurgitation. She is on Flagyl and notices a hard time getting this pill down. Endorses a choking episode in a restaurant 1 year ago. Feels like food gets stuck higher up almost in back of throat. + reflux, takes 40 mg protonix approx 4 times a week, takes it before breakfast. +frequent throat clearing. + Feeling like a lump in the throat.    + anxiety, no meds. On welbutrin for 2 weeks in the past, was taken off because it made her dizzy. Inttermittent dizziness since April, no provoking factros. Very sporatic. No room spinning component, no nausea. Occasional headaches, couple migraines a year. Headaches better with NSAIDs and tylenol. 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  7 days on and 7 days off.     Past Medical History     Past Medical History:   Diagnosis Date    Ankylosing spondylitis ([address]     Arthritis     Crohn's     [place]    Fistula     RECTAL    GERD (gastroesophageal reflux disease)     Iritis 2010    Microcytic anemia     Umbilical hernia        Past Surgical History     Past Surgical History:   Procedure Laterality Date    ABSCESS DRAINAGE  9-2011    RECTAL    COLONOSCOPY      COLONOSCOPY  11-    COLONOSCOPY  12/2011    JOINT REPLACEMENT Right     HIP    OTHER SURGICAL HISTORY  2007    Terminal ileum resection     RECTAL SURGERY  07/31/2012    RECTAL ADVANCEMENT FLAP    SIGMOIDOSCOPY  9/12/12    FLEXIBLE    SIGMOIDOSCOPY  6/7/2017    flexible sigmoidoscopy    SMALL INTESTINE SURGERY      ileocecectomy    UPPER GASTROINTESTINAL ENDOSCOPY         Family History     Family History   Problem Relation Age of Onset    Breast Cancer Mother     Cancer Mother     High Blood Pressure Father     High Cholesterol Father     Diabetes Father     Cancer Paternal Aunt         colon    Diabetes Maternal Grandmother     Cancer Maternal Grandmother         colon    Cancer Maternal Grandfather         colon    High Blood Pressure Paternal Grandmother     Cancer Paternal Grandmother         colon       Social History     Social History     Tobacco Use    Smoking status: Never Smoker    Smokeless tobacco: Never Used   Substance Use Topics    Alcohol use: No    Drug use: No        Allergies     Allergies   Allergen Reactions    Ciprofloxacin Anaphylaxis    Infliximab Anaphylaxis    Remicade [Infliximab Injection]     Enbrel [Etanercept] Other (See Comments)     Rectal abscess     Humira [Adalimumab] Other (See Comments)     Ineffective       Medications     Current Outpatient Medications   Medication Sig Dispense Refill    pantoprazole (PROTONIX) 40 MG tablet TK 1 T PO D      mercaptopurine (PURINETHOL) 50 MG chemo tablet Take 100 mg by mouth daily      metroNIDAZOLE (FLAGYL) 500 MG tablet Take 500 mg by mouth 3 times daily      vitamin D (ERGOCALCIFEROL) 30766 units CAPS capsule Take 1 capsule by mouth every 14 days Then take vit D3 OTC 4,000 units daily with the largest meal of the day 12 capsule 1    Insulin Syringe-Needle U-100 31G X 5/16\" 1 ML MISC To use with twice a week with methotrexate subcutaneous 60 each 1    certolizumab pegol (CIMZIA PREFILLED) 2 X 200 MG/ML KIT injection Inject 200 mg into the skin every 14 days 1 kit 11    omeprazole (PRILOSEC) 40 MG delayed release capsule Take 40 mg by mouth      Cholecalciferol (VITAMIN D3) 1000 units TABS Take 2,000 Units by mouth daily      EPINEPHrine (EPIPEN) 0.3 MG/0.3ML LOBO injection Inject 0.3 mLs into the muscle once as needed for 1 dose. 1 Device 3     No current facility-administered medications for this visit. Review of Systems     REVIEW OF SYSTEMS  The following systems were reviewed and revealed the following in addition to any already discussed in the HPI:    CONSTITUTIONAL: no weight loss, no fever, no night sweats, no chills  EYES: no vision changes, no blurry vision  EARS: no changes in hearing, no otalgia  NOSE: no epistaxis, no rhinorrhea  PSYCH: + anxiety, no depression    PhysicalExam     Vitals:    01/15/21 1435   BP: (!) 141/91   Site: Right Upper Arm   Position: Sitting   Cuff Size: Large Adult   Pulse: 97   Temp: 97.8 °F (36.6 °C)   TempSrc: Temporal   Weight: 188 lb (85.3 kg)       PHYSICAL EXAM  BP (!) 141/91 (Site: Right Upper Arm, Position: Sitting, Cuff Size: Large Adult)   Pulse 97   Temp 97.8 °F (36.6 °C) (Temporal)   Wt 188 lb (85.3 kg)   BMI 31.77 kg/m²     GENERAL: No acute distress, alert and oriented, no hoarseness  EYES: EOMI, Anti-icteric  NOSE: On anterior rhinoscopy there is no epistaxis, nasal mucosa moist and normal appearing, no purulent drainage. EARS: Normal external appearance; on portable otomicroscopy:     -Ad: External auditory canal without stenosis, tympanic membrane intact with evidence of myringosclerosis, no middle ear effusions or retractions.      -As: External auditory canal without stenosis, tympanic membrane intact with evidence of myringosclerosis, no middle ear effusions or retractions.    Pneumatic otoscopy: Bilateral tympanic membranes mobile pneumatic otoscopy  FACE: HB 1/6 bilaterally, symmetric appearing, sensation equal bilaterally  ORAL CAVITY: No masses or lesions palpated, uvula is midline, moist mucous membranes, symmetric 2+ tonsils which are more posteriorly set, dentition without evidence of major decay  NECK: Normal range of motion, no thyromegaly, trachea is midline, no palpable lymphadenopathy or neck masses, no crepitus  CHEST: Normal respiratory effort, breathing comfortably, no retractions  SKIN: No rashes, normal appearing skin, no evidence of skin lesions/tumors  NEURO: Cranial Nerves 2, 3, 4, 5, 6, 7, 11, 12 intact bilaterally     I have performed a head and neck physical exam personally or was physically present during the key or critical portions of the service. Procedure     Procedure: Flexible Laryngoscopy    Pre-op: Dysphagia, globus  Post-op: Same  Procedure : Flexible Nasopharyngolaryngoscopy  Surgeon: Dr. Terrell Carrillo DO  Anesthesia: Afrin with 2% lidocaine  Estimated Blood Loss: None    Description of Procedure:  After obtaining consent, the patient was placed in the examination chair in the upright position. Decongestant and topical anesthetic was sprayed in the bilateral nares and after allowing adequate time for hemostatic effect, the flexible 4 mm laryngoscope was passed via the left nasal passage. Nasal Septum: No acute septal deviation, no septal hematoma or perforations noted   Nasal Findings: No active hemorrhage, no nasal masses appreciated   Nasopharynx: Clear, no masses or inflammation  Oropharynx: Bilateral tonsillar tissue, no exophytic masses  Base of Tongue: Lingual tonsils within normal limits, vallecula without effacement  Epiglottis: Upright, in normal anatomical position  True Vocal Cord: Anatomically normal, no masses or inflammation, normal abduction and adduction upon inspiration and phonation  False Vocal Cord: normal appearing without masses  Hypopharynx Mucosa: No masses or inflammation of the piriform sinuses or postcricoid area  Arytenoids: Normal mucosa, no dislocation appreciated     * Patient tolerated the procedure well with no complications   * Patient was instructed not to eat for 30 minutes following procedure.    * Patient was instructed that they may notice minor bleeding. Attestation:   I was present for the entire viewing, including introduction and removal of the scope. Assessment and Plan     1. Dysphagia, unspecified type  -Nasopharyngolaryngoscopy performed in the office today without evidence of concerning neoplastic or ulcerative lesions. Reassurance provided. -Dysphagia likely secondary to incompletely treated GERD   -Recommend trial of stricter adherence to daily Protonix and conservative lifestyle modification. We will follow up in 2-month to reevaluate. If continues to have dysphagia at that time her symptoms worsen can obtain MBS and/or esophagram.    2. Globus pharyngeus  -Likely secondary to underlying GERD and anxiety    3. Anxiety  -May be contributing to globus, consider treatment from PCP. 4. Gastroesophageal reflux disease, unspecified whether esophagitis present  -Start taking Protonix 7 days a week as prescribed in the morning at least 1 hour prior to breakfast  -Given literature on conservative management lifestyle modification strategies for reflux treatment including:      -Avoidance of late night eating and lying down soon after eating. Consider lying down and sleeping in a reclined position as to reduce the gravity effects of acid reflux.        -Avoidance of trigger foods that could worsen reflux including alcohol, excessively salty foods, spicy foods, acidic foods, chocolate, peppermint, fatty foods, coffee. Follow Up     Return in about 2 months (around 3/15/2021). Zoey rBowne   Department of Otolaryngology/Head & Neck Surgery  1/15/21    Medical Decision Making: The following items were considered in medical decision making:  Independent review of images  Review / order clinical lab tests  Review / order radiology tests  Decision to obtain old records    Portions of this note were dictated using Dragon.  There may be linguistic errors secondary to the use of this program.

## 2021-03-15 DIAGNOSIS — R07.9 CHEST PAIN, UNSPECIFIED TYPE: Primary | ICD-10-CM

## 2021-03-19 ENCOUNTER — TELEPHONE (OUTPATIENT)
Dept: CARDIOLOGY CLINIC | Age: 41
End: 2021-03-19

## 2021-03-22 ENCOUNTER — HOSPITAL ENCOUNTER (OUTPATIENT)
Dept: NON INVASIVE DIAGNOSTICS | Age: 41
Discharge: HOME | End: 2021-03-22
Payer: COMMERCIAL

## 2021-03-22 DIAGNOSIS — R07.9 CHEST PAIN, UNSPECIFIED TYPE: ICD-10-CM

## 2021-03-22 LAB
LV EF: 50 %
LVEF MODALITY: NORMAL

## 2021-03-22 PROCEDURE — 93320 DOPPLER ECHO COMPLETE: CPT

## 2021-03-22 PROCEDURE — 93351 STRESS TTE COMPLETE: CPT

## 2021-03-25 ENCOUNTER — TELEPHONE (OUTPATIENT)
Dept: CARDIOLOGY CLINIC | Age: 41
End: 2021-03-25

## 2021-03-25 NOTE — TELEPHONE ENCOUNTER
Patient was scheduled for a Stress Echo @ [facility] on 3/22/21. Testing was ordered by [employer]. The authorization request was run and was pending as of date of service. Even though the authorization was pending, the patient had the testing. I received a denial for this test. If the physician would like to speak to a physician reviewer to overturn the denial, please call [phone]. Please refer to Case #[id number] when calling. If denial is overturned, please provide authorization number.

## 2021-03-29 ENCOUNTER — IMMUNIZATION (OUTPATIENT)
Dept: PRIMARY CARE CLINIC | Age: 41
End: 2021-03-29
Payer: COMMERCIAL

## 2021-03-29 PROCEDURE — 91301 COVID-19, MODERNA VACCINE 100MCG/0.5ML DOSE: CPT | Performed by: FAMILY MEDICINE

## 2021-03-29 PROCEDURE — 0012A COVID-19, MODERNA VACCINE 100MCG/0.5ML DOSE: CPT | Performed by: FAMILY MEDICINE

## 2021-03-31 ENCOUNTER — TELEPHONE (OUTPATIENT)
Dept: ORTHOPEDIC SURGERY | Age: 41
End: 2021-03-31

## 2021-03-31 NOTE — TELEPHONE ENCOUNTER
I spoke with patient and told her that Dr Marty Gold can do another hip injection but we will need new X-rays since her last visit was on 3/2020. She understood.  She already has an appt on 4/13/21

## 2021-03-31 NOTE — TELEPHONE ENCOUNTER
Other Patient is requesting an hip injection under ultrasound. Would like a call to verify that she can get the procedure.  ph [phone]

## 2021-04-02 NOTE — TELEPHONE ENCOUNTER
It looks like the [place] SE was canceled and the patient completed the [facility].  The pre-cert notes from that completed appt indicate no pre-cert was required.

## 2021-04-13 ENCOUNTER — OFFICE VISIT (OUTPATIENT)
Dept: ORTHOPEDIC SURGERY | Age: 41
End: 2021-04-13
Payer: COMMERCIAL

## 2021-04-13 VITALS
DIASTOLIC BLOOD PRESSURE: 89 MMHG | SYSTOLIC BLOOD PRESSURE: 128 MMHG | BODY MASS INDEX: 31.92 KG/M2 | HEIGHT: 64 IN | HEART RATE: 95 BPM | WEIGHT: 187 LBS

## 2021-04-13 DIAGNOSIS — M16.12 PRIMARY OSTEOARTHRITIS OF LEFT HIP: Primary | ICD-10-CM

## 2021-04-13 PROCEDURE — 20611 DRAIN/INJ JOINT/BURSA W/US: CPT | Performed by: ORTHOPAEDIC SURGERY

## 2021-04-13 RX ORDER — METHYLPREDNISOLONE ACETATE 40 MG/ML
80 INJECTION, SUSPENSION INTRA-ARTICULAR; INTRALESIONAL; INTRAMUSCULAR; SOFT TISSUE ONCE
Status: COMPLETED | OUTPATIENT
Start: 2021-04-13 | End: 2021-04-13

## 2021-04-13 RX ORDER — LIDOCAINE HYDROCHLORIDE 10 MG/ML
5 INJECTION, SOLUTION INFILTRATION; PERINEURAL ONCE
Status: COMPLETED | OUTPATIENT
Start: 2021-04-13 | End: 2021-04-13

## 2021-04-13 RX ADMIN — LIDOCAINE HYDROCHLORIDE 5 ML: 10 INJECTION, SOLUTION INFILTRATION; PERINEURAL at 12:27

## 2021-04-13 RX ADMIN — METHYLPREDNISOLONE ACETATE 80 MG: 40 INJECTION, SUSPENSION INTRA-ARTICULAR; INTRALESIONAL; INTRAMUSCULAR; SOFT TISSUE at 12:27

## 2021-04-13 NOTE — PROGRESS NOTES
ULTRASOUND GUIDED HIP INJECTION    Howie Perez    April 13, 2021    Chief Complaint   Patient presents with    Hip Pain     LT Hip UltraSound Injection, last one done on 3/17/20       /89   Pulse 95   Ht 5' 4\" (1.626 m)   Wt 187 lb (84.8 kg)   BMI 32.10 kg/m²     Bonnie Paz returns for a repeat ultrasound directed intra-articular steroid injection of her left hip. 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 She is under the care of Dr Edmar Leija for this problem. The last injection given to her by myself on March 17, 2020 lasted until just recently. She now has intermittent left groin pain which is rated 0 at rest and up to 7 with activities especially stair climbing. She has no difficulty putting a shoe on her left foot. Past medical history does include a right total hip replacement by Dr. Latasha Barreto in the past.  She does complain of some persistent groin pain with flexion of her right hip. I have today reviewed with Howie Perez the clinically relevant past medical history, medications, allergies, family history, and Review of Systems from the patients most recent history form, and I have documented any details relevant to today's presenting complaints in my history above. The patient's self recorded documents concerning the above have been scanned  into the chart under the \"Media\" tab. Physical Exam:   Examination of the lefthip shows a negative logroll. No Lesion of the skin is noted over the injection site      X-Ray Findings Done In Office:  3 views left hip with pelvis read by myself show marked narrowing of her left hip joint space associated with osteophytic changes of the femoral head and cystic changes of the acetabulum. There is little change from previous films of the right hip taken on March 16, 2020. Impression:  1.  Inflammatory arthritis of her left hip related to underlying

## 2021-04-13 NOTE — PATIENT INSTRUCTIONS
Impression:  1. Inflammatory arthritis of her left hip related to underlying inflammatory bowel disease and ankylosing spondylitis. 2.  Status post successful right total hip replacement by Dr. Ngoc Mc. She does have some signs and symptoms of iliopsoas bursal irritation. Plan:  1. Injection with cortisone was recommended into  left   hip joint using ultrasound visualization. She understands the risks and benefits of the injection and describes no potential allergies. Time out was performed to verify correct person, correct procedure, and correct site. 2. Ultrasound visualization was first performed utilizing the [facility] Ultrasound unit using an 5/2 MHz Curved Probe. finding the femoral neck head junction. Next the femoral artery and vein were visualized with ultrasound medial to the femoral head. The location of the needle insertion was determined well lateral to the neurovascular structures and the site was anesthetized with 3 mL of 1% Lidocaine. The site was then prepped with ChloraPrep. A sterile cover was placed over the transducer head and the femoral head neck junction once again visualized. A 20-gauge spinal needle was then introduced under ultrasound control to the head neck junction. Once the needle was intracapsular the hip joint was injected with 2 mL  of 1% Lidocaine mixed with 2 ml of 40 mg Depo Medrol. Sandy tolerated the procedure well and  did not report  relief of  hip pain within 5 minutes of the injection, however she did not have pain in her left hip today prior to the injection. 3.  Prognosis was discussed. She is not interested in pursuing a left total hip replacement yet at this time. 4.  Treatment of the right hip was also discussed. I recommended stretching program and possible iliopsoas bursal injection under ultrasound control. Jude Machuca MD  4/13/2021

## 2021-04-22 ENCOUNTER — HOSPITAL ENCOUNTER (OUTPATIENT)
Age: 41
Discharge: HOME | End: 2021-04-22
Payer: COMMERCIAL

## 2021-04-22 PROCEDURE — 86480 TB TEST CELL IMMUN MEASURE: CPT

## 2021-04-24 LAB
QUANTI TB GOLD PLUS: NEGATIVE
QUANTI TB1 MINUS NIL: 0 IU/ML (ref 0–0.34)
QUANTI TB2 MINUS NIL: 0.24 IU/ML (ref 0–0.34)
QUANTIFERON MITOGEN: 7.26 IU/ML
QUANTIFERON NIL: 0.43 IU/ML

## 2021-09-21 ENCOUNTER — TELEPHONE (OUTPATIENT)
Dept: ORTHOPEDIC SURGERY | Age: 41
End: 2021-09-21

## 2021-10-05 ENCOUNTER — TELEPHONE (OUTPATIENT)
Dept: ORTHOPEDIC SURGERY | Age: 41
End: 2021-10-05

## 2021-10-05 ENCOUNTER — OFFICE VISIT (OUTPATIENT)
Dept: ORTHOPEDIC SURGERY | Age: 41
End: 2021-10-05
Payer: COMMERCIAL

## 2021-10-05 VITALS
DIASTOLIC BLOOD PRESSURE: 79 MMHG | HEIGHT: 64 IN | SYSTOLIC BLOOD PRESSURE: 119 MMHG | HEART RATE: 97 BPM | BODY MASS INDEX: 31.92 KG/M2 | WEIGHT: 187 LBS

## 2021-10-05 DIAGNOSIS — S76.819A STRAIN OF RECTUS FEMORIS MUSCLE: Primary | ICD-10-CM

## 2021-10-05 PROCEDURE — G8417 CALC BMI ABV UP PARAM F/U: HCPCS | Performed by: ORTHOPAEDIC SURGERY

## 2021-10-05 PROCEDURE — G8427 DOCREV CUR MEDS BY ELIG CLIN: HCPCS | Performed by: ORTHOPAEDIC SURGERY

## 2021-10-05 PROCEDURE — G8484 FLU IMMUNIZE NO ADMIN: HCPCS | Performed by: ORTHOPAEDIC SURGERY

## 2021-10-05 PROCEDURE — 1036F TOBACCO NON-USER: CPT | Performed by: ORTHOPAEDIC SURGERY

## 2021-10-05 PROCEDURE — 99213 OFFICE O/P EST LOW 20 MIN: CPT | Performed by: ORTHOPAEDIC SURGERY

## 2021-10-05 RX ORDER — TRIAMCINOLONE ACETONIDE 40 MG/ML
40 INJECTION, SUSPENSION INTRA-ARTICULAR; INTRAMUSCULAR ONCE
Status: DISCONTINUED | OUTPATIENT
Start: 2021-10-05 | End: 2021-10-05

## 2021-10-05 RX ORDER — LIDOCAINE HYDROCHLORIDE 10 MG/ML
5 INJECTION, SOLUTION INFILTRATION; PERINEURAL ONCE
Status: DISCONTINUED | OUTPATIENT
Start: 2021-10-05 | End: 2021-10-05

## 2021-10-05 NOTE — TELEPHONE ENCOUNTER
General Question     Subject: RT  ILIOPSOAS   Patient and /or Facility Request: Nery Blount  Contact Number: [phone]    PATIENT CALLING REGARDING THE HE LT HIP IS FEELING FINE, BUT SHE WOULD LIKE TO GET AN INJECTION FOR THE RT  ILIOPSOAS                      PLEASE CALL BACK AT THE ABOVE NUMBER.

## 2021-10-05 NOTE — PROGRESS NOTES
ULTRASOUND ILIOPSOAS BURSA INJECTION    Sonya Diop    October 5, 2021      Emmanuel Wylie returns after 6 months with complaints concerning her right hip. She is a long-term patient of Dr. Miguel A Weinstein who did a right total hip replacement approximately 6 years ago. She was last seen in the office on April 13, 2021 for a left hip ultrasound injection for her hip osteoarthritis. While in the office she complained of pain in the anterior aspect of her right hip and brief examination suggested possible iliopsoas tendon irritation. She states that she has always had some pain in the anterior aspect of her hip ever since her right hip replacement. She has never had any formal physical therapy for this problem. She complains of pain primarily over the anterolateral aspect of her right hip just below the anterior superior iliac spine. She believes this is associated with scarring related to her anterior hip incision. She has no complaints of numbness distally. She does have difficulty with some activities of daily living involving lifting of her right hip. /79   Pulse 97   Ht 5' 4\" (1.626 m)   Wt 187 lb (84.8 kg)   BMI 32.10 kg/m²      I have today reviewed with Sonya Diop the clinically relevant past medical history, medications, allergies, family history, and Review of Systems from the patients most recent history form, and I have documented any details relevant to today's presenting complaints in my history above. The patient's self recorded documents concerning the above have been scanned  into the chart under the \"Media\" tab. Physical Exam:   Examination shows hypertrophic scar involving the area of the rectus femoris tendon adjacent and adjacent tensor fascia muscle. She actually has no groin tenderness. She has more pain with resisted hip flexion with the knee straight than she does with the knee bent.   She has negative logroll for the right hip and has negative straight leg raising. .     No Lesion of the skin is noted over the injection site     Palpitation Tenderness: Over the rectus femoris tendon. Special Testing:     Log Roll [facility]) FADIR Straight Leg Raising Stinchfield Trendelenberg Iliopsoas Stress Test   Right neg   neg      +   Left neg   neg   neg       X-rays of the right hip as seen on pelvis film performed on April 13, 2021. There appears to be a stable right total hip replacement on AP view. There is an area of decreased radiodensity surrounding the acetabular screw however this finding is unchanged from films done back in 2018. Impression:  1. Pain related to incisional scar of right total hip replacement is felt to be hypertrophic scar in the rectus femoris muscle is the most likely possibility. 2.  Iliopsoas bursal tendon irritation at the bursa is also possible. Plan:  1. She is started in physical therapy for program of stretching her anterior hip musculature including rectus femoris and iliopsoas. 2.  If she does not show definite improvement with physical therapy she is to see Dr. Kim George for further evaluation.     Nimesh Pedraza MD  10/5/2021

## 2021-10-05 NOTE — PATIENT INSTRUCTIONS
Impression:  1. Pain related to incisional scar of right total hip replacement is felt to be hypertrophic scar in the rectus femoris muscle is the most likely possibility. 2.  Iliopsoas bursal tendon irritation at the bursa is also possible. Plan:  1. She is started in physical therapy for program of stretching her anterior hip musculature including rectus femoris and iliopsoas. 2.  If she does not show definite improvement with physical therapy she is to see Dr. Ernesto Pop for further evaluation.     Devon Matson MD  10/5/2021

## 2021-10-12 ENCOUNTER — HOSPITAL ENCOUNTER (OUTPATIENT)
Dept: PHYSICAL THERAPY | Age: 41
Setting detail: THERAPIES SERIES
Discharge: HOME | End: 2021-10-12
Payer: COMMERCIAL

## 2021-10-12 PROCEDURE — 97140 MANUAL THERAPY 1/> REGIONS: CPT | Performed by: PHYSICAL THERAPIST

## 2021-10-12 PROCEDURE — 97110 THERAPEUTIC EXERCISES: CPT | Performed by: PHYSICAL THERAPIST

## 2021-10-12 PROCEDURE — 97161 PT EVAL LOW COMPLEX 20 MIN: CPT | Performed by: PHYSICAL THERAPIST

## 2021-10-12 NOTE — PLAN OF CARE
Umer Guevara [address] [facility], [address] [place] [address]  Phone: [phone]  Fax: [phone]      Physical Therapy Certification    Dear Referring Practitioner: Joella Dubin, MD,    We had the pleasure of evaluating the following patient for physical therapy services at [address] [place] [address]. A summary of our findings can be found in the initial assessment below. This includes our plan of care. If you have any questions or concerns regarding these findings, please do not hesitate to contact me at the office phone number checked above. Thank you for the referral.       Physician Signature:_______________________________Date:__________________  By signing above (or electronic signature), therapists plan is approved by physician      Patient: Pham Joy   : 1980   MRN: [id number]  Referring Physician: Referring Practitioner: Joella Dubin, MD      Evaluation Date: 10/12/2021      Medical Diagnosis Information:  Diagnosis: S76.819 A  R Rectus Femoris Strain   Treatment Diagnosis: R hip tendonitis and bursitis, has 10 yo R THR                                         Insurance information: PT Insurance Information: [facility]  $50 copay  60V PCY     Precautions/ Contra-indications:     C-SSRS Triggered by Intake questionnaire (Past 2 wk assessment ):   [x] No, Questionnaire did not trigger screening.   [] Yes, Patient intake triggered C-SSRS Screening      [] C-SSRS Screening completed  [] PCP notified via Epic    Latex Allergy:  [x]NO      []YES  Preferred Language for Healthcare:   [x]English       []other:    SUBJECTIVE: Patient stated complaint: Pt present with a 10 yo R THR performed by Dr. Kemal Mcgrath. She reports prolonged R anterior hip flexor pain. Pain is noted after working out, with stair ambulation, and position changes. Pt is concerned it is scar tissue. She is now getting L hip injections with relief. Pt saw Dr. Jamal Mackenzie for her L hip and he recommended PT and possibly referral to Dr. Jacqulin Olszewski.    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 5cm difference with actual measurement.     Bandages/Dressings/Incisions: incision is well healed and flat in nature    Gait: (include devices/WB status) no AD, = step length    Dermatomes Normal Abnormal Comments   inguinal area (L1)  X     anterior mid-thigh (L2) X     distal ant thigh/med knee (L3) X     medial lower leg and foot (L4) X     lateral lower leg and foot (L5) X     posterior calf (S1) X     medial calcaneus (S2) X         Reflexes Normal Abnormal Comments   S1-2 Seated achilles X     S1-2 Prone knee bend      L3-4 Patellar tendon X     Clonus      Babinski           PROM AROM    L R L R   Hip Flexion       Hip Abduction       Hip ER       Hip IR       Knee Flexion       Knee Extension           Strength (0-5) / Myotomes Left Right   Hip Flexion - seated (L1-2) 5 4-   Hip Abduction 4+ 4   Hip Adduction     Hip ER 5 4   Hip IR 5 4   Quads (L2-4) 5 5   Hamstrings 5 5   Ankle Dorsiflexion (L4-5) 5 5        Flexibility     Hamstrings (90/90) 40 45 LENA Bansal) MIN-MOD MOD   Quads (Ely's) MIN-MOD MOD   Hip Flexor Faisal Joseluis) 43 47            Joint mobility: R hip w THR   [x]Normal    []Hypo   []Hyper    Orthopaedic Special Tests  Positive  Negative  NT Comments    Hip       JUDITH / Bebo's   x D/t THR   FADIR       Scour       Trendelenburg                  Balance: NBSO x 10\", tandem stance x10\"                           [x] Patient history, allergies, meds reviewed. Medical chart reviewed. See intake form. Review Of Systems (ROS):  [x]Performed Review of systems (Integumentary, CardioPulmonary, Neurological) by intake and observation. Intake form has been scanned into medical record. Patient has been instructed to contact their primary care physician regarding ROS issues if not already being addressed at this time.       Co-morbidities/Complexities (which will affect course of rehabilitation):  []None           Arthritic conditions   [x]Rheumatoid arthritis (M05.9)  []Osteoarthritis (M19.91)   Cardiovascular conditions   []Hypertension (I10)  []Hyperlipidemia (E78.5)  []Angina pectoris (I20)  []Atherosclerosis (I70)   Musculoskeletal conditions   []Disc pathology   []Congenital spine pathologies   [x]Prior surgical intervention  []Osteoporosis (M81.8)  []Osteopenia (M85.8)   Endocrine conditions   []Hypothyroid (E03.9)  []Hyperthyroid Gastrointestinal conditions   []Constipation (O87.33)   Metabolic conditions   []Morbid obesity (E66.01)  []Diabetes type 1(E10.65) or 2 (E11.65)   []Neuropathy (G60.9)     Pulmonary conditions   []Asthma (J45)  []Coughing   []COPD (J44.9)   Psychological Disorders  [x]Anxiety (F41.9)  []Depression (F32.9)   []Other:   [x]Other:     R THR  Chron's w bowel resection     Barriers to/and or personal factors that will affect rehab potential:              []Age  []Sex    []Smoker              []Motivation/Lack of Motivation                        [x]Co-Morbidities              []Cognitive Function, education/learning barriers []Environmental, home barriers              []profession/work barriers  []past PT/medical experience  []other:  Justification:     Falls Risk Assessment (30 days):  [x] Falls Risk assessed and no intervention required. [] Falls Risk assessed and Patient requires intervention due to being higher risk   TUG score (>12s at risk):     [] Falls education provided, including        ASSESSMENT:   Functional Impairments:     []Noted lumbar/proximal hip/LE joint hypomobility   [x]Decreased LE functional ROM   [x]Decreased core/proximal hip strength and neuromuscular control   []Decreased LE functional strength   []Reduced balance/proprioceptive control   []other:      Functional Activity Limitations (from functional questionnaire and intake)   [x]Reduced ability to tolerate prolonged functional positions   [x]Reduced ability or difficulty with changes of positions or transfers between positions   []Reduced ability to maintain good posture and demonstrate good body mechanics with sitting, bending, and lifting   []Reduced ability to sleep   [] Reduced ability or tolerance with driving and/or computer work   []Reduced ability to perform lifting, carrying tasks   [x]Reduced ability to squat   []Reduced ability to forward bend   [x]Reduced ability to ambulate prolonged functional periods/distances/surfaces   [x]Reduced ability to ascend/descend stairs   [x]Reduced ability to run, hop, cut or jump   []other:    Participation Restrictions   []Reduced participation in self care activities   [x]Reduced participation in home management activities   [x]Reduced participation in work activities   [x]Reduced participation in social activities. [x]Reduced participation in sport/recreation activities. Classification :    []Signs/symptoms consistent with post-surgical status including decreased ROM, strength and function.    []Signs/symptoms consistent with joint sprain/strain   []Signs/symptoms consistent with patella-femoral syndrome   []Signs/symptoms consistent with knee OA/hip OA   []Signs/symptoms consistent with internal derangement of knee/Hip   [x]Signs/symptoms consistent with functional core, hip weakness/NMR control      [x]Signs/symptoms consistent with tendinitis/tendinosis    []signs/symptoms consistent with pathology which may benefit from Dry needling      []other:      Prognosis/Rehab Potential:      []Excellent   [x]Good    []Fair   []Poor    Tolerance of evaluation/treatment:    []Excellent   [x]Good    []Fair   []Poor    Physical Therapy Evaluation Complexity Justification  [x] A history of present problem with:  [] no personal factors and/or comorbidities that impact the plan of care;  []1-2 personal factors and/or comorbidities that impact the plan of care  [x]3 personal factors and/or comorbidities that impact the plan of care  [x] An examination of body systems using standardized tests and measures addressing any of the following: body structures and functions (impairments), activity limitations, and/or participation restrictions;:  [] a total of 1-2 or more elements   [x] a total of 3 or more elements   [] a total of 4 or more elements   [x] A clinical presentation with:  [x] stable and/or uncomplicated characteristics   [] evolving clinical presentation with changing characteristics  [] unstable and unpredictable characteristics;   [x] Clinical decision making of [x] low, [] moderate, [] high complexity using standardized patient assessment instrument and/or measurable assessment of functional outcome.     [x] EVAL (LOW) 42545 (typically 15 minutes face-to-face)  [] EVAL (MOD) 80153 (typically 30 minutes face-to-face)  [] EVAL (HIGH) 18792 (typically 45 minutes face-to-face)  [] RE-EVAL     PLAN:   Frequency/Duration:  2 days per week for 4 Weeks:  Interventions:  [x]  Therapeutic exercise including: strength training, ROM, for Lower extremity and core   [x]  NMR activation and proprioception for LE, Glutes and Core   [x]  Manual therapy as indicated for LE, Hip and spine to include: Dry Needling/IASTM, STM, PROM, Gr I-IV mobilizations, manipulation. [x] Modalities as needed that may include: thermal agents, E-stim, Biofeedback, US, iontophoresis as indicated  [x] Patient education on joint protection, postural re-education, activity modification, progression of HEP. HEP instruction: Written HEP instructions provided and reviewed    GOALS:  Patient stated goal:improve or resolve pain  [] Progressing: [] Met: [] Not Met: [] Adjusted    Therapist goals for Patient:   Short Term Goals: To be achieved in: 2 weeks  1. Independent in HEP and progression per patient tolerance, in order to prevent re-injury. [] Progressing: [] Met: [] Not Met: [] Adjusted  2. Patient will have a decrease in pain to facilitate improvement in movement, function, and ADLs as indicated by Functional Deficits. [] Progressing: [] Met: [] Not Met: [] Adjusted    Long Term Goals: To be achieved in: 4-6 weeks  1. Disability index score of 10% or less for the LEFS to assist with reaching prior level of function. [] Progressing: [] Met: [] Not Met: [] Adjusted  2. Patient will demonstrate increased R hip AROM/flexibility to WNLs to allow for proper joint functioning as indicated by patients Functional Deficits. [] Progressing: [] Met: [] Not Met: [] Adjusted  3. Patient will demonstrate an increase in R hip Strength to at least 4+/5 as well as good proximal hip strength and control to allow for proper functional mobility as indicated by patients Functional Deficits. [] Progressing: [] Met: [] Not Met: [] Adjusted  4. Patient will return to functional activities including stair ambulation, and position changes without increased symptoms or restriction. [] Progressing: [] Met: [] Not Met: [] Adjusted  5.  Patient will report 70% improvement in pain and function    [] Progressing: [] Met: [] Not Met: [] Adjusted     Electronically signed by: Walter Muhammad, PT MPT [id number]

## 2021-10-12 NOTE — FLOWSHEET NOTE
Intervention (29232)       STM to ant/lateral R hip   Manual R hip flexor stretch  Roller to R quad, ITB, glut   12' Pt is ticklish                                          Pt. Education:  -pt educated on diagnosis, prognosis and expectations for rehab  -all pt questions were answered    Home Exercise Program:  Access Code: [email or web address]  URL: [email or web address]  Date: 10/12/2021  Prepared by: Melodie Ache    Exercises  Supine Bridge - 2 x daily - 7 x weekly - 2 sets - 10 reps  Supine Hip Adductor Stretch - 1 x daily - 7 x weekly - 1 sets - 2 reps - 20 hold  Clamshell - 1 x daily - 7 x weekly - 1 sets - 10 reps - 1 hold  Hip Flexor Stretch on Step - 2 x daily - 7 x weekly - 1 sets - 2 reps - 30 hold  Hamstring stretch on steps - 1 x daily - 7 x weekly - 1 sets - 1 reps - 30 hold    Therapeutic Exercise and NMR EXR  [x] (37446) Provided verbal/tactile cueing for activities related to strengthening, flexibility, endurance, ROM for improvements in LE, proximal hip, and core control with self care, mobility, lifting, ambulation.  [] (46968) Provided verbal/tactile cueing for activities related to improving balance, coordination, kinesthetic sense, posture, motor skill, proprioception  to assist with LE, proximal hip, and core control in self care, mobility, lifting, ambulation and eccentric single leg control.      NMR and Therapeutic Activities:    [] (28031 or 31347) Provided verbal/tactile cueing for activities related to improving balance, coordination, kinesthetic sense, posture, motor skill, proprioception and motor activation to allow for proper function of core, proximal hip and LE with self care and ADLs  [] (28353) Gait Re-education- Provided training and instruction to the patient for proper LE, core and proximal hip recruitment and positioning and eccentric body weight control with ambulation re-education including up and down stairs     Home Exercise Program:    [x] (04696) Reviewed/Progressed HEP activities related to strengthening, flexibility, endurance, ROM of core, proximal hip and LE for functional self-care, mobility, lifting and ambulation/stair navigation   [] (38785)Reviewed/Progressed HEP activities related to improving balance, coordination, kinesthetic sense, posture, motor skill, proprioception of core, proximal hip and LE for self care, mobility, lifting, and ambulation/stair navigation      Manual Treatments:  PROM / STM / Oscillations-Mobs:  G-I, II, III, IV (PA's, Inf., Post.)  [x] (96304) Provided manual therapy to mobilize LE, proximal hip and/or LS spine soft tissue/joints for the purpose of modulating pain, promoting relaxation,  increasing ROM, reducing/eliminating soft tissue swelling/inflammation/restriction, improving soft tissue extensibility and allowing for proper ROM for normal function with self care, mobility, lifting and ambulation. Modalities:  [] (66123) Vasopneumatic compression: Utilized vasopneumatic compression to decrease edema / swelling for the purpose of improving mobility and quad tone / recruitment which will allow for increased overall function including but not limited to self-care, transfers, ambulation, and ascending / descending stairs. Modalities: 10/12/21: trial US @ 50% 1.5w/cm x 6min to R hip flexor & grt trochanter with biofreeze - pt supine with pillow under knees. Encouraged ice at home     Charges:  Timed Code Treatment Minutes: 30   Total Treatment Minutes: 52     [x] EVAL - LOW (10269)   [] EVAL - MOD (57447)  [] EVAL - HIGH (44640)  [] RE-EVAL (31532)  [x] TE (27515) x 1     [] Ionto (84414)  [] NMR (09224) x      [] Vaso (47580)  [x] Manual (27288) x 1     [] Ultrasound  [] TA (34947) x      [] Mech Traction (55816)  [] Gait Training x     [] ES (un) (09841):   [] Aquatic therapy x   [] Other:   [] Group:     GOALS:   Patient stated goal:improve or resolve pain  []? Progressing: []? Met: []? Not Met: []?  Adjusted     Therapist goals for Patient:   Short Term Goals: To be achieved in: 2 weeks  1. Independent in HEP and progression per patient tolerance, in order to prevent re-injury. []? Progressing: []? Met: []? Not Met: []? Adjusted  2. Patient will have a decrease in pain to facilitate improvement in movement, function, and ADLs as indicated by Functional Deficits. []? Progressing: []? Met: []? Not Met: []? Adjusted     Long Term Goals: To be achieved in: 4-6 weeks  1. Disability index score of 10% or less for the LEFS to assist with reaching prior level of function. []? Progressing: []? Met: []? Not Met: []? Adjusted  2. Patient will demonstrate increased R hip AROM/flexibility to WNLs to allow for proper joint functioning as indicated by patients Functional Deficits. []? Progressing: []? Met: []? Not Met: []? Adjusted  3. Patient will demonstrate an increase in R hip Strength to at least 4+/5 as well as good proximal hip strength and control to allow for proper functional mobility as indicated by patients Functional Deficits. []? Progressing: []? Met: []? Not Met: []? Adjusted  4. Patient will return to functional activities including stair ambulation, and position changes without increased symptoms or restriction. []? Progressing: []? Met: []? Not Met: []? Adjusted  5. Patient will report 70% improvement in pain and function    []? Progressing: []? Met: []? Not Met: []? Adjusted         Overall Progression Towards Functional goals/ Treatment Progress Update:  [] Patient is progressing as expected towards functional goals listed. [] Progression is slowed due to complexities/Impairments listed. [] Progression has been slowed due to co-morbidities.   [x] Plan just implemented, too soon to assess goals progression <30days   [] Goals require adjustment due to lack of progress  [] Patient is not progressing as expected and requires additional follow up with physician  [] Other    Persisting Functional Limitations/Impairments:  [x]Sitting []Standing   [x]Walking [x]Stairs   [x]Transfers []ADLs   [x]Squatting/bending []Kneeling  []Housework []Job related tasks  []Driving [x]Sports/Recreation   []Sleeping []Other:    ASSESSMENT:  See eval. I feel this is tendonitis and bursitis. Inflammation is noted. It is possible that there is scar tissue at [facility]. Treatment/Activity Tolerance:  [] Pt able to complete treatment [] Patient limited by fatique  [] Patient limited by pain  [] Patient limited by other medical complications  [] Other:     Prognosis:  [] Good [] Fair  [] Poor    Patient Requires Follow-up: [x] Yes  [] No    Return to Play:    [x]  N/A   []  Stage 1: Intro to Strength   []  Stage 2: Return to Run and Strength   []  Stage 3: Return to Jump and Strength   []  Stage 4: Dynamic Strength and Agility   []  Stage 5: Sport Specific Training     []  Ready to Return to Play, Meets All Above Stages   []  Not Ready for Return to Sports   Comments:            Plan for next treatment session:    PLAN: See eval. PT 2x / week for 4-6 weeks. [] Continue per plan of care [] Alter current plan (see comments)  [x] Plan of care initiated [] Hold pending MD visit [] Discharge    Electronically signed by: KEON Chaves [id number]      Note: If patient does not return for scheduled/ recommended follow up visits, this note will serve as a discharge from care along with most recent update on progress.

## 2021-10-14 ENCOUNTER — HOSPITAL ENCOUNTER (OUTPATIENT)
Dept: PHYSICAL THERAPY | Age: 41
Setting detail: THERAPIES SERIES
Discharge: HOME | End: 2021-10-14
Payer: COMMERCIAL

## 2021-10-14 PROCEDURE — 97110 THERAPEUTIC EXERCISES: CPT

## 2021-10-14 PROCEDURE — 97140 MANUAL THERAPY 1/> REGIONS: CPT

## 2021-10-14 NOTE — FLOWSHEET NOTE
[facility]  Phone: [phone]  Fax: [phone]    Physical Therapy Treatment Note/ Progress Report:     Date:  10/14/2021    Patient Name:  Oksana Araujo :  1980  MRN: [id number]  Restrictions/Precautions:    Medical/Treatment Diagnosis Information:  Diagnosis: S76.819 A  R Rectus Femoris Strain  Treatment Diagnosis: R hip tendonitis and bursitis, has 10 yo R THR  Insurance/Certification information:  PT Insurance Information: [facility]  $50 copay  250 [facility]  Physician Information:  Referring Practitioner: Mary Alice Parham MD  Plan of care signed (Y/N): []  Yes  [x]  No     Date of Patient follow up with Physician:      Progress Report: []  Yes  [x]  No     Date Range for reporting period:  Beginning:10/12/21  Ending:     Progress report due (10 Rx/or 30 days whichever is less):      Recertification due (POC duration/ or 90 days whichever is less):      Visit # Insurance Allowable Auth required? Date Range   2 60 []  Yes  []  No PCY     Latex Allergy:  [x]NO      []YES  Preferred Language for Healthcare:   [x]English       []other:    Functional Scale:       Date assessed:  LEFS: raw score = 69; dysfunction = 13%   10/12/21    Pain level: 0/10 currently     SUBJECTIVE: Pt does not have any hip pain currently, more in her L calf actually.  Didn't feel much difference after last time, not sore though     OBJECTIVE: See eval      RESTRICTIONS/PRECAUTIONS: R THR, chron's, ankylosing spondylosis    Exercises/Interventions:   R ant hip pain tendonitis / bursitis  Therapeutic Exercise (35447)  Resistance / level Sets/sec Reps Notes / Cues   Bike seat 6 1.2 5'     IB  30\" 2    B HSS  R Hip flexor step str  30\"  10\" 2  3           Standing marches  Mini squats   20  10                  Mat Ex:  TB clam  Sl hip abd  SL LE circles  q-ped hip ext  q-ped opp UE/LE  90/90 hold  Bridge with add set     15  10      10 ea    15           HEP: Bridge  Clam  HSS: on step on long sit  Supine adductor stretch     10  10  2x30\"  2x20\" pn w SLR   Therapeutic Activities (90566)                                   Neuromuscular Re-ed (95876)                            Manual Intervention ([id number])       STM to ant/lateral R hip   Manual R hip flexor stretch   to R quad, ITB, glut Cross friction  10' Pt is ticklish                                          Pt. Education:  -pt educated on diagnosis, prognosis and expectations for rehab  -all pt questions were answered    Home Exercise Program:  Access Code: [email or web address]  URL: [email or web address]  Date: 10/12/2021  Prepared by: Melodie Ache    Exercises  Supine Bridge - 2 x daily - 7 x weekly - 2 sets - 10 reps  Supine Hip Adductor Stretch - 1 x daily - 7 x weekly - 1 sets - 2 reps - 20 hold  Clamshell - 1 x daily - 7 x weekly - 1 sets - 10 reps - 1 hold  Hip Flexor Stretch on Step - 2 x daily - 7 x weekly - 1 sets - 2 reps - 30 hold  Hamstring stretch on steps - 1 x daily - 7 x weekly - 1 sets - 1 reps - 30 hold    Therapeutic Exercise and NMR EXR  [x] (25704) Provided verbal/tactile cueing for activities related to strengthening, flexibility, endurance, ROM for improvements in LE, proximal hip, and core control with self care, mobility, lifting, ambulation.  [] (70494) Provided verbal/tactile cueing for activities related to improving balance, coordination, kinesthetic sense, posture, motor skill, proprioception  to assist with LE, proximal hip, and core control in self care, mobility, lifting, ambulation and eccentric single leg control.      NMR and Therapeutic Activities:    [] (20854 or 72968) Provided verbal/tactile cueing for activities related to improving balance, coordination, kinesthetic sense, posture, motor skill, proprioception and motor activation to allow for proper function of core, proximal hip and LE with self care and ADLs  [] (56415) Gait Re-education- Provided training and instruction to the patient for proper LE, core and proximal hip recruitment and positioning and eccentric body weight control with ambulation re-education including up and down stairs     Home Exercise Program:    [x] (63846) Reviewed/Progressed HEP activities related to strengthening, flexibility, endurance, ROM of core, proximal hip and LE for functional self-care, mobility, lifting and ambulation/stair navigation   [] (09130)Reviewed/Progressed HEP activities related to improving balance, coordination, kinesthetic sense, posture, motor skill, proprioception of core, proximal hip and LE for self care, mobility, lifting, and ambulation/stair navigation      Manual Treatments:  PROM / STM / Oscillations-Mobs:  G-I, II, III, IV (PA's, Inf., Post.)  [x] (32382) Provided manual therapy to mobilize LE, proximal hip and/or LS spine soft tissue/joints for the purpose of modulating pain, promoting relaxation,  increasing ROM, reducing/eliminating soft tissue swelling/inflammation/restriction, improving soft tissue extensibility and allowing for proper ROM for normal function with self care, mobility, lifting and ambulation. Modalities:  [] (45407) Vasopneumatic compression: Utilized vasopneumatic compression to decrease edema / swelling for the purpose of improving mobility and quad tone / recruitment which will allow for increased overall function including but not limited to self-care, transfers, ambulation, and ascending / descending stairs. Modalities: 10/14/21: US @ 50% 1.5w/cm x 8min to R hip flexor & grt trochanter with biofreeze - pt supine with pillow under knees.  Encouraged ice at home     Charges:  Timed Code Treatment Minutes: 48   Total Treatment Minutes: 48     [] EVAL - LOW (26902)   [] EVAL - MOD (88425)  [] EVAL - HIGH (02764)  [] RE-EVAL (58559)  [x] TE (71098) x 2     [] Ionto (33210)  [] NMR (79491) x      [] Vaso (51093)  [x] Manual (17536) x 1     [] Ultrasound  [] TA (03593) x      [] Mech Traction (17815)  [] Gait Training x     [] ES (un) (22 [id number]):   [] Aquatic therapy x   [] Other:   [] Group:     GOALS:   Patient stated goal:improve or resolve pain  [] Progressing: [] Met: [] Not Met: [] Adjusted     Therapist goals for Patient:   Short Term Goals: To be achieved in: 2 weeks  1. Independent in HEP and progression per patient tolerance, in order to prevent re-injury. [x] Progressing: [] Met: [] Not Met: [] Adjusted  2. Patient will have a decrease in pain to facilitate improvement in movement, function, and ADLs as indicated by Functional Deficits. [x] Progressing: [] Met: [] Not Met: [] Adjusted     Long Term Goals: To be achieved in: 4-6 weeks  1. Disability index score of 10% or less for the LEFS to assist with reaching prior level of function. [x] Progressing: [] Met: [] Not Met: [] Adjusted  2. Patient will demonstrate increased R hip AROM/flexibility to WNLs to allow for proper joint functioning as indicated by patients Functional Deficits. [x] Progressing: [] Met: [] Not Met: [] Adjusted  3. Patient will demonstrate an increase in R hip Strength to at least 4+/5 as well as good proximal hip strength and control to allow for proper functional mobility as indicated by patients Functional Deficits. [x] Progressing: [] Met: [] Not Met: [] Adjusted  4. Patient will return to functional activities including stair ambulation, and position changes without increased symptoms or restriction. [x] Progressing: [] Met: [] Not Met: [] Adjusted  5. Patient will report 70% improvement in pain and function    [x] Progressing: [] Met: [] Not Met: [] Adjusted         Overall Progression Towards Functional goals/ Treatment Progress Update:  [] Patient is progressing as expected towards functional goals listed. [] Progression is slowed due to complexities/Impairments listed. [] Progression has been slowed due to co-morbidities.   [x] Plan just implemented, too soon to assess goals progression <30days   [] Goals require adjustment due to lack of progress  [] Patient is not progressing as expected and requires additional follow up with physician  [] Other    Persisting Functional Limitations/Impairments:  [x]Sitting []Standing   [x]Walking [x]Stairs   [x]Transfers []ADLs   [x]Squatting/bending []Kneeling  []Housework []Job related tasks  []Driving [x]Sports/Recreation   []Sleeping []Other:    ASSESSMENT: Pt had fair exercise tolerance, no increase in symptoms. Focused on stretches and hip strengthening; challenged by SL hip abduction. Pt noted that manual STM felt good; tender along incision and very tight in hip flexor, able to tolerate cross friction massage. Continued with US. Treatment/Activity Tolerance:  [] Pt able to complete treatment [] Patient limited by fatique  [] Patient limited by pain  [] Patient limited by other medical complications  [] Other:     Prognosis:  [] Good [] Fair  [] Poor    Patient Requires Follow-up: [x] Yes  [] No    Return to Play:    [x]  N/A   []  Stage 1: Intro to Strength   []  Stage 2: Return to Run and Strength   []  Stage 3: Return to Jump and Strength   []  Stage 4: Dynamic Strength and Agility   []  Stage 5: Sport Specific Training     []  Ready to Return to Play, Meets All Above Stages   []  Not Ready for Return to Sports   Comments:            Plan for next treatment session:    PLAN: See dixie. PT 2x / week for 4-6 weeks. [x] Continue per plan of care [] Alter current plan (see comments)  [] Plan of care initiated [] Hold pending MD visit [] Discharge    Electronically signed by: Ana Martinez, PTA [id number]      Note: If patient does not return for scheduled/ recommended follow up visits, this note will serve as a discharge from care along with most recent update on progress.

## 2021-10-18 ENCOUNTER — HOSPITAL ENCOUNTER (OUTPATIENT)
Dept: PHYSICAL THERAPY | Age: 41
Setting detail: THERAPIES SERIES
Discharge: HOME | End: 2021-10-18
Payer: COMMERCIAL

## 2021-10-18 PROCEDURE — 97140 MANUAL THERAPY 1/> REGIONS: CPT

## 2021-10-18 PROCEDURE — 97110 THERAPEUTIC EXERCISES: CPT

## 2021-10-18 NOTE — FLOWSHEET NOTE
[facility]  Phone: [phone]  Fax: [phone]    Physical Therapy Treatment Note/ Progress Report:     Date:  10/18/2021    Patient Name:  Arjun Sanchez :  1980  MRN: [id number]  Restrictions/Precautions:    Medical/Treatment Diagnosis Information:   Diagnosis: S76.819 A  R Rectus Femoris Strain   Treatment Diagnosis: R hip tendonitis and bursitis, has 10 yo R THR  Insurance/Certification information:  PT Insurance Information: [facility]  $50 copay  250 [facility]  Physician Information:  Referring Practitioner: Oziel Martin MD  Plan of care signed (Y/N): []  Yes  [x]  No     Date of Patient follow up with Physician:      Progress Report: []  Yes  [x]  No     Date Range for reporting period:  Beginning:10/12/21  Ending:     Progress report due (10 Rx/or 30 days whichever is less):      Recertification due (POC duration/ or 90 days whichever is less):      Visit # Insurance Allowable Auth required? Date Range   3 60 []  Yes  []  No PCY     Latex Allergy:  [x]NO      []YES  Preferred Language for Healthcare:   [x]English       []other:    Functional Scale:       Date assessed:  LEFS: raw score = 69; dysfunction = 13%   10/12/21    Pain level: 2/10     SUBJECTIVE: Pt got a back massage a couple days ago, felt good after but didn't last long. Is getting ready for 21 days in a row at work.     OBJECTIVE: See eval      RESTRICTIONS/PRECAUTIONS: R THR, chron's, ankylosing spondylosis    Exercises/Interventions:   R ant hip pain tendonitis / bursitis  Therapeutic Exercise (13035)  Resistance / level Sets/sec Reps Notes / Cues   Bike seat 6 1.2 5'     IB  30\" 2    B HSS  R Hip flexor step str  30\"  10\" 2  3           Standing marches  Mini squats  Hip abd   20  10  10 R/L                  Mat Ex:  TB clam  Sl hip abd  SL LE circles  q-ped hip ext  q-ped opp UE/LE  90/90 hold  Bridge with add set     15  10  10 cw, ccw    10 ea  10x10  15 HEP:   Bridge  Clam  HSS: on step on long sit  Supine adductor stretch     10  10  2x30\"  2x20\" pn w SLR   Therapeutic Activities (58333)                                   Neuromuscular Re-ed (18111)                            Manual Intervention ([id number])       STM to ant/lateral R hip   Manual R hip flexor stretch   to R quad, ITB, glut Cross friction  10' Pt is ticklish                                          Pt. Education:  -pt educated on diagnosis, prognosis and expectations for rehab  -all pt questions were answered    Home Exercise Program:  Access Code: [email or web address]  URL: [email or web address]  Date: 10/12/2021  Prepared by: Tenzin Ripper    Exercises  Supine Bridge - 2 x daily - 7 x weekly - 2 sets - 10 reps  Supine Hip Adductor Stretch - 1 x daily - 7 x weekly - 1 sets - 2 reps - 20 hold  Clamshell - 1 x daily - 7 x weekly - 1 sets - 10 reps - 1 hold  Hip Flexor Stretch on Step - 2 x daily - 7 x weekly - 1 sets - 2 reps - 30 hold  Hamstring stretch on steps - 1 x daily - 7 x weekly - 1 sets - 1 reps - 30 hold    Therapeutic Exercise and NMR EXR  [x] (92435) Provided verbal/tactile cueing for activities related to strengthening, flexibility, endurance, ROM for improvements in LE, proximal hip, and core control with self care, mobility, lifting, ambulation.  [] (90556) Provided verbal/tactile cueing for activities related to improving balance, coordination, kinesthetic sense, posture, motor skill, proprioception  to assist with LE, proximal hip, and core control in self care, mobility, lifting, ambulation and eccentric single leg control.      NMR and Therapeutic Activities:    [] (98229 or 39620) Provided verbal/tactile cueing for activities related to improving balance, coordination, kinesthetic sense, posture, motor skill, proprioception and motor activation to allow for proper function of core, proximal hip and LE with self care and ADLs  [] (64346) Gait Re-education- Provided training and instruction to the patient for proper LE, core and proximal hip recruitment and positioning and eccentric body weight control with ambulation re-education including up and down stairs     Home Exercise Program:    [x] (34188) Reviewed/Progressed HEP activities related to strengthening, flexibility, endurance, ROM of core, proximal hip and LE for functional self-care, mobility, lifting and ambulation/stair navigation   [] (76494)Reviewed/Progressed HEP activities related to improving balance, coordination, kinesthetic sense, posture, motor skill, proprioception of core, proximal hip and LE for self care, mobility, lifting, and ambulation/stair navigation      Manual Treatments:  PROM / STM / Oscillations-Mobs:  G-I, II, III, IV (PA's, Inf., Post.)  [x] (32388) Provided manual therapy to mobilize LE, proximal hip and/or LS spine soft tissue/joints for the purpose of modulating pain, promoting relaxation,  increasing ROM, reducing/eliminating soft tissue swelling/inflammation/restriction, improving soft tissue extensibility and allowing for proper ROM for normal function with self care, mobility, lifting and ambulation. Modalities:  [] (89571) Vasopneumatic compression: Utilized vasopneumatic compression to decrease edema / swelling for the purpose of improving mobility and quad tone / recruitment which will allow for increased overall function including but not limited to self-care, transfers, ambulation, and ascending / descending stairs.        Modalities: 10/18/21: US @ 50% 1.5w/cm x 8min to R hip flexor & grt trochanter with biofreeze - pt supine with pillow under knees    Charges:  Timed Code Treatment Minutes: 46   Total Treatment Minutes: 46     [] EVAL - LOW (84049)   [] EVAL - MOD (35221)  [] EVAL - HIGH (21651)  [] RE-EVAL (13099)  [x] TE (78033) x 2     [] Ionto (41782)  [] NMR (35217) x      [] Vaso (04227)  [x] Manual (75118) x 1     [] Ultrasound  [] TA (19573) x      [] Mech Traction (30979)  [] Gait adjustment due to lack of progress  [] Patient is not progressing as expected and requires additional follow up with physician  [] Other    Persisting Functional Limitations/Impairments:  [x]Sitting []Standing   [x]Walking [x]Stairs   [x]Transfers []ADLs   [x]Squatting/bending []Kneeling  []Housework []Job related tasks  []Driving [x]Sports/Recreation   []Sleeping []Other:    ASSESSMENT: Pt had fair exercise tolerance, no increase in symptoms. Focused on stretches and hip strengthening; challenged by SL hip abduction. Pt noted that manual STM felt good; tender along incision and very tight in hip flexor, able to tolerate cross friction massage. Continued with US. Emphasized importance of keeping up with exercises and stretches during work week. Treatment/Activity Tolerance:  [] Pt able to complete treatment [] Patient limited by fatique  [] Patient limited by pain  [] Patient limited by other medical complications  [] Other:     Prognosis:  [] Good [] Fair  [] Poor    Patient Requires Follow-up: [x] Yes  [] No    Return to Play:    [x]  N/A   []  Stage 1: Intro to Strength   []  Stage 2: Return to Run and Strength   []  Stage 3: Return to Jump and Strength   []  Stage 4: Dynamic Strength and Agility   []  Stage 5: Sport Specific Training     []  Ready to Return to Play, Meets All Above Stages   []  Not Ready for Return to Sports   Comments:            Plan for next treatment session:    PLAN: See eval. PT 2x / week for 4-6 weeks. [x] Continue per plan of care [] Alter current plan (see comments)  [] Plan of care initiated [] Hold pending MD visit [] Discharge    Electronically signed by: Ana Cristina Steinberg, PTA [id number]      Note: If patient does not return for scheduled/ recommended follow up visits, this note will serve as a discharge from care along with most recent update on progress.

## 2021-10-21 ENCOUNTER — HOSPITAL ENCOUNTER (OUTPATIENT)
Dept: PHYSICAL THERAPY | Age: 41
Setting detail: THERAPIES SERIES
Discharge: HOME | End: 2021-10-21
Payer: COMMERCIAL

## 2021-10-21 PROCEDURE — 97140 MANUAL THERAPY 1/> REGIONS: CPT | Performed by: PHYSICAL THERAPIST

## 2021-10-21 PROCEDURE — 97530 THERAPEUTIC ACTIVITIES: CPT | Performed by: PHYSICAL THERAPIST

## 2021-10-21 PROCEDURE — 97110 THERAPEUTIC EXERCISES: CPT | Performed by: PHYSICAL THERAPIST

## 2021-10-21 NOTE — FLOWSHEET NOTE
[place] , [place]  Phone: [phone]  Fax: [phone]    Physical Therapy Treatment Note/ Progress Report:     Date:  10/21/2021    Patient Name:  Darron Haney :  1980  MRN: [id number]  Restrictions/Precautions:    Medical/Treatment Diagnosis Information:   Diagnosis: S76.819 A  R Rectus Femoris Strain   Treatment Diagnosis: R hip tendonitis and bursitis, has 10 yo R THR  Insurance/Certification information:  PT Insurance Information: [facility]  $50 copay  [address] [facility]  Physician Information:  Referring Practitioner: Amber Camarillo MD  Plan of care signed (Y/N): []  Yes  [x]  No     Date of Patient follow up with Physician:      Progress Report: []  Yes  [x]  No     Date Range for reporting period:  Beginning:10/12/21  Ending:     Progress report due (10 Rx/or 30 days whichever is less):      Recertification due (POC duration/ or 90 days whichever is less):      Visit # Insurance Allowable Auth required? Date Range   4 60 []  Yes  []  No PCY     Latex Allergy:  [x]NO      []YES  Preferred Language for Healthcare:   [x]English       []other:    Functional Scale:       Date assessed:  LEFS: raw score = 69; dysfunction = 13%   10/12/21    Pain level: 2/10   History: Pt present with a 10 yo R THR performed by Dr. Gio Gannon. She reports prolonged R anterior hip flexor pain. Pain is noted after working out, with stair ambulation, and position changes. Pt is concerned it is scar tissue. She is now getting L hip injections with relief. Pt saw Dr. Marilou Landry for her L hip and he recommended PT and possibly referral to Dr. Dre Ochoa.     SUBJECTIVE: Pt continues to have R anterior hip tightness, she was unable to do HEP yesterday d/t work schedule. Pt has not resumed going to the gym.     OBJECTIVE: See eval      RESTRICTIONS/PRECAUTIONS: R THR, chron's, ankylosing spondylosis    Exercises/Interventions:   R ant hip pain tendonitis / bursitis  Therapeutic Exercise (69305)  Resistance / level Sets/sec Reps Notes / Cues   Bike seat 6 1.2 5'     IB  30\" 2    B HSS  R Hip flexor step str  30\"  10\" 2  3           Foam:  NBOS with EC  Standing marches  Mini squats   Hip abd     20\"  20  15  10 R/L               Has pn with SLR   Mat Ex:  TB clam  SL hip abd  SL LE circles  SL hip flex, ext  q-ped opp UE/LE  90/90 hold  Bridge with add set  TB hip abd  plank   Lime              lime    15  10  10 cw, ccw  add  10 ea  10x10  15  15  add   Add to HEP NPV  Add to HEP NPV          HEP:   Bridge  Clam  HSS: on step on long sit  Supine adductor stretch     10  10  2x30\"  2x20\" pn w SLR   Therapeutic Activities (32475)       Rocker board  Balance  F/B     30\"  30\"    FSU  LSU 6\"  6\"  10 R  10 R    TB lat gait  TB monster step   add           Neuromuscular Re-ed (63023)                            Manual Intervention ([phone])       STM to ant/lateral R hip   Manual R hip flexor stretch   Cross friction w/wo hawkgrip  10'  5x R Pt is ticklish   K tape Star pattern over R Grt trochanter                                      Pt. Education: 10/21: Reviewed care of the tape with the patient and patient knows to remove tape if pain increases or if she notices a skin sensitivity or allergy to the tape. Patient verbalized understanding of all instructions. -pt educated on diagnosis, prognosis and expectations for rehab  -all pt questions were answered    Home Exercise Program:  Access Code: [email or web address]  URL: [email or web address]  Date: 10/12/2021  Prepared by: Crystal Every    Exercises  Supine Bridge - 2 x daily - 7 x weekly - 2 sets - 10 reps  Supine Hip Adductor Stretch - 1 x daily - 7 x weekly - 1 sets - 2 reps - 20 hold  Clamshell - 1 x daily - 7 x weekly - 1 sets - 10 reps - 1 hold  Hip Flexor Stretch on Step - 2 x daily - 7 x weekly - 1 sets - 2 reps - 30 hold  Hamstring stretch on steps - 1 x daily - 7 x weekly - 1 sets - 1 reps - 30 hold    Therapeutic Exercise and NMR EXR  [x] (06352) Provided verbal/tactile cueing for activities related to strengthening, flexibility, endurance, ROM for improvements in LE, proximal hip, and core control with self care, mobility, lifting, ambulation.  [] (15567) Provided verbal/tactile cueing for activities related to improving balance, coordination, kinesthetic sense, posture, motor skill, proprioception  to assist with LE, proximal hip, and core control in self care, mobility, lifting, ambulation and eccentric single leg control.      NMR and Therapeutic Activities:    [x] (62151 or 97694) Provided verbal/tactile cueing for activities related to improving balance, coordination, kinesthetic sense, posture, motor skill, proprioception and motor activation to allow for proper function of core, proximal hip and LE with self care and ADLs  [] (90434) Gait Re-education- Provided training and instruction to the patient for proper LE, core and proximal hip recruitment and positioning and eccentric body weight control with ambulation re-education including up and down stairs     Home Exercise Program:    [x] (89162) Reviewed/Progressed HEP activities related to strengthening, flexibility, endurance, ROM of core, proximal hip and LE for functional self-care, mobility, lifting and ambulation/stair navigation   [] (47266)Reviewed/Progressed HEP activities related to improving balance, coordination, kinesthetic sense, posture, motor skill, proprioception of core, proximal hip and LE for self care, mobility, lifting, and ambulation/stair navigation      Manual Treatments:  PROM / STM / Oscillations-Mobs:  G-I, II, III, IV (PA's, Inf., Post.)  [x] (26389) Provided manual therapy to mobilize LE, proximal hip and/or LS spine soft tissue/joints for the purpose of modulating pain, promoting relaxation,  increasing ROM, reducing/eliminating soft tissue swelling/inflammation/restriction, improving soft tissue extensibility and allowing for proper ROM for normal function with self care, mobility, lifting and ambulation. Modalities:  [] (28631) Vasopneumatic compression: Utilized vasopneumatic compression to decrease edema / swelling for the purpose of improving mobility and quad tone / recruitment which will allow for increased overall function including but not limited to self-care, transfers, ambulation, and ascending / descending stairs. Modalities: NA this date    Charges:  Timed Code Treatment Minutes: 50   Total Treatment Minutes: 50     [] EVAL - LOW (23766)   [] EVAL - MOD (99913)  [] EVAL - HIGH (01577)  [] RE-EVAL (97531)  [x] TE (54045) x 21    [] Ionto (93839)  [] NMR (78446) x      [] Vaso (31854)  [x] Manual (23975) x 1     [] Ultrasound  [x] TA (55166) x  1    [] Mech Traction (07517)  [] Gait Training x     [] ES (un) (85208):   [] Aquatic therapy x   [] Other:   [] Group:     GOALS:   Patient stated goal:improve or resolve pain  [x] Progressing: [] Met: [] Not Met: [] Adjusted     Therapist goals for Patient:   Short Term Goals: To be achieved in: 2 weeks  1. Independent in HEP and progression per patient tolerance, in order to prevent re-injury. [x] Progressing: [] Met: [] Not Met: [] Adjusted  2. Patient will have a decrease in pain to facilitate improvement in movement, function, and ADLs as indicated by Functional Deficits. [x] Progressing: [] Met: [] Not Met: [] Adjusted     Long Term Goals: To be achieved in: 4-6 weeks  1. Disability index score of 10% or less for the LEFS to assist with reaching prior level of function. [x] Progressing: [] Met: [] Not Met: [] Adjusted  2. Patient will demonstrate increased R hip AROM/flexibility to WNLs to allow for proper joint functioning as indicated by patients Functional Deficits. [x] Progressing: [] Met: [] Not Met: [] Adjusted  3.  Patient will demonstrate an increase in R hip Strength to at least 4+/5 as well as good proximal hip strength and control to allow for proper functional mobility as indicated by patients Functional Deficits. [x] Progressing: [] Met: [] Not Met: [] Adjusted  4. Patient will return to functional activities including stair ambulation, and position changes without increased symptoms or restriction. [x] Progressing: [] Met: [] Not Met: [] Adjusted  5. Patient will report 70% improvement in pain and function    [x] Progressing: [] Met: [] Not Met: [] Adjusted         Overall Progression Towards Functional goals/ Treatment Progress Update:  [] Patient is progressing as expected towards functional goals listed. [] Progression is slowed due to complexities/Impairments listed. [] Progression has been slowed due to co-morbidities. [x] Plan just implemented, too soon to assess goals progression <30days   [] Goals require adjustment due to lack of progress  [] Patient is not progressing as expected and requires additional follow up with physician  [] Other    Persisting Functional Limitations/Impairments:  [x]Sitting []Standing   [x]Walking [x]Stairs   [x]Transfers []ADLs   [x]Squatting/bending []Kneeling  []Housework []Job related tasks  []Driving [x]Sports/Recreation   []Sleeping []Other:    ASSESSMENT: Pt had ok exercise tolerance, no increase in symptoms. Focused more on hip strengthening; challenged by SL hip abduction. Held US, cont manual. PT noted cont edema at ant/lat hip. Trial K-tape in star pattern. Discussed making appt with Dr. Kalia Rice if s/s cont, or possible dry needling.     Treatment/Activity Tolerance:  [x] Pt able to complete treatment [] Patient limited by fatique  [] Patient limited by pain  [] Patient limited by other medical complications  [] Other:     Prognosis:  [x] Good [x] Fair  [] Poor    Patient Requires Follow-up: [x] Yes  [] No    Return to Play:    [x]  N/A   []  Stage 1: Intro to Strength   []  Stage 2: Return to Run and Strength   []  Stage 3: Return to Jump and Strength   []  Stage 4: Dynamic Strength and Agility   []  Stage 5: Sport Specific Training     []  Ready to Return to Play, Meets All Above Stages   []  Not Ready for Return to Sports   Comments:            Plan for next treatment session: Update HEP, issue TB    PLAN: See eval. PT 2x / week for 4-6 weeks.    [x] Continue per plan of care [] Alter current plan (see comments)  [] Plan of care initiated [] Hold pending MD visit [] Discharge    Electronically signed by: Glenn Charlton, PT MPT [id number]

## 2021-10-26 ENCOUNTER — HOSPITAL ENCOUNTER (OUTPATIENT)
Dept: PHYSICAL THERAPY | Age: 41
Setting detail: THERAPIES SERIES
Discharge: HOME | End: 2021-10-26
Payer: COMMERCIAL

## 2021-10-26 PROCEDURE — 97530 THERAPEUTIC ACTIVITIES: CPT | Performed by: PHYSICAL THERAPIST

## 2021-10-26 PROCEDURE — 97110 THERAPEUTIC EXERCISES: CPT | Performed by: PHYSICAL THERAPIST

## 2021-10-26 PROCEDURE — 97140 MANUAL THERAPY 1/> REGIONS: CPT | Performed by: PHYSICAL THERAPIST

## 2021-10-26 NOTE — FLOWSHEET NOTE
Ovi Guevara  Phone: [phone]  Fax: [phone]    Physical Therapy Treatment Note/ Progress Report:     Date:  10/26/2021    Patient Name:  Nia Heller :  1980  MRN: [id number]  Restrictions/Precautions:    Medical/Treatment Diagnosis Information:   Diagnosis: S76.819 A  R Rectus Femoris Strain   Treatment Diagnosis: R hip tendonitis and bursitis, has 10 yo R THR  Insurance/Certification information:  PT Insurance Information: [facility]  $50 copay  [address] [facility]  Physician Information:  Referring Practitioner: Benjamin Atkins MD  Plan of care signed (Y/N): []  Yes  [x]  No     Date of Patient follow up with Physician:      Progress Report: []  Yes  [x]  No     Date Range for reporting period:  Beginning:10/12/21  Ending:     Progress report due (10 Rx/or 30 days whichever is less):      Recertification due (POC duration/ or 90 days whichever is less):      Visit # Insurance Allowable Auth required? Date Range   5 60 []  Yes  []  No PCY     Latex Allergy:  [x]NO      []YES  Preferred Language for Healthcare:   [x]English       []other:    Functional Scale:       Date assessed:  LEFS: raw score = 69; dysfunction = 13%   10/12/21    Pain level: 2/10   History: Pt present with a 10 yo R THR performed by Dr. Jerardo Lester. She reports prolonged R anterior hip flexor pain. Pain is noted after working out, with stair ambulation, and position changes. Pt is concerned it is scar tissue. She is now getting L hip injections with relief. Pt saw Dr. Colonel Casey for her L hip and he recommended PT and possibly referral to Dr. Crosby Phalen.     SUBJECTIVE: Pt feel her hip tightness and swelling are improving. She liked the K-tape.      OBJECTIVE: See eval      RESTRICTIONS/PRECAUTIONS: R THR, chron's, ankylosing spondylosis    Exercises/Interventions:   R ant hip pain tendonitis / bursitis  Therapeutic Exercise (37592)  Resistance / level Sets/sec Reps Notes / Cues Bike seat 6 1.2 5'     IB  30\" 2    B HSS  R Hip flexor step str  30\"  10\" 2  3           Foam:  NBOS with EC  Standing marches  Mini squats   Hip abd     20\"  20  15  10 R/L               Has pn with SLR   Mat Ex:  TB clam  SL hip abd  SL LE circles  SL hip flex, ext  q-ped opp UE/LE  q-ped mule kick  90/90 hold  Bridge with add set  TB hip abd  plank   Lime                lime    15  10  10 cw, ccw  add  10 ea  10 L/R    15  15  3x10\"   Add to HEP NPV  Add to HEP NPV          HEP:   Bridge  Clam  HSS: on step on long sit  Supine adductor stretch     10  10  2x30\"  2x20\" pn w SLR   Therapeutic Activities (19641)       Rocker board  Balance  F/B, S/S  Ball toss     30\"  30\" ea  1'    FSU  LSU with hip hike 6\"  6\"  15 R  15 L/ R    TB lat gait  TB monster step Lime  lime  2 laps  2 laps    CC: hip ext, abd 5#  10 ea R    Neuromuscular Re-ed (56278)                            Manual Intervention (61998)       STM to ant/lateral R hip   Manual R hip flexor stretch   Cross friction w/wo hawkgrip  10'  5x R Pt is ticklish   K tape Star pattern over R Grt trochanter                                      Pt. Education: 10/21: Reviewed care of the tape with the patient and patient knows to remove tape if pain increases or if she notices a skin sensitivity or allergy to the tape. Patient verbalized understanding of all instructions. -pt educated on diagnosis, prognosis and expectations for rehab  -all pt questions were answered    Home Exercise Program:  10/26: TB clam, SL hip abd, LE circles    Access Code: [email or web address]  URL: [email or web address]  Date: 10/12/2021  Prepared by: Major Fried    Exercises  Supine Bridge - 2 x daily - 7 x weekly - 2 sets - 10 reps  Supine Hip Adductor Stretch - 1 x daily - 7 x weekly - 1 sets - 2 reps - 20 hold  Clamshell - 1 x daily - 7 x weekly - 1 sets - 10 reps - 1 hold  Hip Flexor Stretch on Step - 2 x daily - 7 x weekly - 1 sets - 2 reps - 30 hold  Hamstring stretch on steps - 1 x daily - 7 x weekly - 1 sets - 1 reps - 30 hold    Therapeutic Exercise and NMR EXR  [x] (19499) Provided verbal/tactile cueing for activities related to strengthening, flexibility, endurance, ROM for improvements in LE, proximal hip, and core control with self care, mobility, lifting, ambulation.  [] (59570) Provided verbal/tactile cueing for activities related to improving balance, coordination, kinesthetic sense, posture, motor skill, proprioception  to assist with LE, proximal hip, and core control in self care, mobility, lifting, ambulation and eccentric single leg control.      NMR and Therapeutic Activities:    [x] (02093 or 71263) Provided verbal/tactile cueing for activities related to improving balance, coordination, kinesthetic sense, posture, motor skill, proprioception and motor activation to allow for proper function of core, proximal hip and LE with self care and ADLs  [] (68352) Gait Re-education- Provided training and instruction to the patient for proper LE, core and proximal hip recruitment and positioning and eccentric body weight control with ambulation re-education including up and down stairs     Home Exercise Program:    [x] (12040) Reviewed/Progressed HEP activities related to strengthening, flexibility, endurance, ROM of core, proximal hip and LE for functional self-care, mobility, lifting and ambulation/stair navigation   [] (39511)Reviewed/Progressed HEP activities related to improving balance, coordination, kinesthetic sense, posture, motor skill, proprioception of core, proximal hip and LE for self care, mobility, lifting, and ambulation/stair navigation      Manual Treatments:  PROM / STM / Oscillations-Mobs:  G-I, II, III, IV (PA's, Inf., Post.)  [x] (90861) Provided manual therapy to mobilize LE, proximal hip and/or LS spine soft tissue/joints for the purpose of modulating pain, promoting relaxation,  increasing ROM, reducing/eliminating soft tissue swelling/inflammation/restriction, improving soft tissue extensibility and allowing for proper ROM for normal function with self care, mobility, lifting and ambulation. Modalities:  [] (87298) Vasopneumatic compression: Utilized vasopneumatic compression to decrease edema / swelling for the purpose of improving mobility and quad tone / recruitment which will allow for increased overall function including but not limited to self-care, transfers, ambulation, and ascending / descending stairs. Modalities: NA this date    Charges:  Timed Code Treatment Minutes: 50   Total Treatment Minutes: 50     [] EVAL - LOW (02123)   [] EVAL - MOD (65370)  [] EVAL - HIGH (48004)  [] RE-EVAL (48319)  [x] TE (55393) x 1    [] Ionto (18973)  [] NMR (54406) x      [] Vaso (49371)  [x] Manual (32138) x 1     [] Ultrasound  [x] TA (29859) x  1    [] Mech Traction (17181)  [] Gait Training x     [] ES (un) (85122):   [] Aquatic therapy x   [] Other:   [] Group:     GOALS:   Patient stated goal:improve or resolve pain  [x] Progressing: [] Met: [] Not Met: [] Adjusted     Therapist goals for Patient:   Short Term Goals: To be achieved in: 2 weeks  1. Independent in HEP and progression per patient tolerance, in order to prevent re-injury. [x] Progressing: [x] Met: [] Not Met: [] Adjusted  2. Patient will have a decrease in pain to facilitate improvement in movement, function, and ADLs as indicated by Functional Deficits. [x] Progressing: [x] Met: [] Not Met: [] Adjusted     Long Term Goals: To be achieved in: 4-6 weeks  1. Disability index score of 10% or less for the LEFS to assist with reaching prior level of function. [x] Progressing: [] Met: [] Not Met: [] Adjusted  2. Patient will demonstrate increased R hip AROM/flexibility to WNLs to allow for proper joint functioning as indicated by patients Functional Deficits. [x] Progressing: [] Met: [] Not Met: [] Adjusted  3.  Patient will demonstrate an increase in R hip Strength to at least 4+/5 as well as good proximal hip strength and control to allow for proper functional mobility as indicated by patients Functional Deficits. [x] Progressing: [] Met: [] Not Met: [] Adjusted  4. Patient will return to functional activities including stair ambulation, and position changes without increased symptoms or restriction. [x] Progressing: [] Met: [] Not Met: [] Adjusted  5. Patient will report 70% improvement in pain and function    [x] Progressing: [] Met: [] Not Met: [] Adjusted         Overall Progression Towards Functional goals/ Treatment Progress Update:  [] Patient is progressing as expected towards functional goals listed. [] Progression is slowed due to complexities/Impairments listed. [] Progression has been slowed due to co-morbidities. [x] Plan just implemented, too soon to assess goals progression <30days   [] Goals require adjustment due to lack of progress  [] Patient is not progressing as expected and requires additional follow up with physician  [] Other    Persisting Functional Limitations/Impairments:  [x]Sitting []Standing   [x]Walking [x]Stairs   [x]Transfers []ADLs   [x]Squatting/bending []Kneeling  []Housework []Job related tasks  []Driving [x]Sports/Recreation   []Sleeping []Other:    ASSESSMENT: Pt had ok exercise tolerance,added more hip strengthening; challenged by SL hip abduction. cont manual. PT noted cont edema at ant/lat hip. Cont K-tape in star pattern. Discussed making appt with Dr. Suki Wyman if s/s cont, or possible dry needling.     Treatment/Activity Tolerance:  [x] Pt able to complete treatment [] Patient limited by fatique  [] Patient limited by pain  [] Patient limited by other medical complications  [] Other:     Prognosis:  [x] Good [x] Fair  [] Poor    Patient Requires Follow-up: [x] Yes  [] No    Return to Play:    [x]  N/A   []  Stage 1: Intro to Strength   []  Stage 2: Return to Run and Strength   []  Stage 3: Return to Jump and Strength   [] Stage 4: Dynamic Strength and Agility   []  Stage 5: Sport Specific Training     []  Ready to Return to Play, Meets All Above Stages   []  Not Ready for Return to Sports   Comments:            Plan for next treatment session: Update HEP, issue TB    PLAN: See eval. PT 2x / week for 4-6 weeks.    [x] Continue per plan of care [] Alter current plan (see comments)  [] Plan of care initiated [] Hold pending MD visit [] Discharge    Electronically signed by: Mo Herndon, PT MPT [id number]

## 2021-10-28 ENCOUNTER — HOSPITAL ENCOUNTER (OUTPATIENT)
Dept: PHYSICAL THERAPY | Age: 41
Setting detail: THERAPIES SERIES
Discharge: HOME | End: 2021-10-28
Payer: COMMERCIAL

## 2021-10-28 PROCEDURE — 97530 THERAPEUTIC ACTIVITIES: CPT | Performed by: PHYSICAL THERAPIST

## 2021-10-28 PROCEDURE — 97110 THERAPEUTIC EXERCISES: CPT | Performed by: PHYSICAL THERAPIST

## 2021-10-28 PROCEDURE — 97140 MANUAL THERAPY 1/> REGIONS: CPT | Performed by: PHYSICAL THERAPIST

## 2021-10-28 NOTE — FLOWSHEET NOTE
Ovi Guevara  Phone: [phone]  Fax: [phone]    Physical Therapy Treatment Note/ Progress Report:     Date:  10/28/2021    Patient Name:  Igor Martinez :  1980  MRN: [id number]  Restrictions/Precautions:    Medical/Treatment Diagnosis Information:   Diagnosis: S76.819 A  R Rectus Femoris Strain   Treatment Diagnosis: R hip tendonitis and bursitis, has 10 yo R THR  Insurance/Certification information:  PT Insurance Information: [facility]  $50 copay  [facility]  Physician Information:  Referring Practitioner: Herrera Baca MD  Plan of care signed (Y/N): []  Yes  [x]  No     Date of Patient follow up with Physician:      Progress Report: []  Yes  [x]  No     Date Range for reporting period:  Beginning:  10/12/21  Ending:     Progress report due (10 Rx/or 30 days whichever is less):      Recertification due (POC duration/ or 90 days whichever is less):      Visit # Insurance Allowable Auth required? Date Range   5 60 []  Yes  []  No PCY     Latex Allergy:  [x]NO      []YES  Preferred Language for Healthcare:   [x]English       []other:    Functional Scale:       Date assessed:  LEFS: raw score = 69; dysfunction = 13%   10/12/21    Pain level: 2/10     History: Pt present with a 10 yo R THR performed by Dr. Satish Coughlin. She reports prolonged R anterior hip flexor pain. Pain is noted after working out, with stair ambulation, and position changes. Pt is concerned it is scar tissue. She is now getting L hip injections with relief. Pt saw Dr. Jhony Pandya for her L hip and he recommended PT and possibly referral to Dr. Zoya Amaya.     SUBJECTIVE: Pt was a little more sore after last session. PT recommended patient return to the gym with modified exercise.      OBJECTIVE: See eval      RESTRICTIONS/PRECAUTIONS: R THR, chron's, ankylosing spondylosis    Exercises/Interventions:   R ant hip pain tendonitis / bursitis  Therapeutic Exercise (53977) Resistance / level Sets/sec Reps Notes / Cues   Bike seat 6 1.2 5'     IB  30\" 2    B HSS  R Hip flexor step str  30\"  10\" 2  3           Foam:  NBOS with EC  Standing marches  Mini squats  HR   Hip abd     20\"  20  20  20  10 R/L               Has pn with SLR   Mat Ex:  TB clam  SL hip abd  SL LE circles  SL hip flex, ext  q-ped opp UE/LE  q-ped mule kick  90/90 hold  Bridge with add set  TB hip abd  plank   Lime                lime    15  10  10 cw, ccw  add  10 ea  10 L/R    15  15  3x10\"             HEP:   Bridge  Clam  HSS: on step on long sit  Supine adductor stretch     10  10  2x30\"  2x20\" pn w SLR   Therapeutic Activities (40261)       Rocker board  Balance  F/B, S/S  Ball toss     30\"  30\" ea  1'    FSU  LSU with hip hike 6\"  6\"  15 R  15 L/ R    TB lat gait  TB monster step Lime  lime  2 laps      Educated on proper gym machines at [facility] that are appropriate to use x             CC: hip ext, abd 5#  10 ea R    Neuromuscular Re-ed (35683)       TG squat   20                  Manual Intervention (88979)       STM to ant/lateral R hip   Manual R hip flexor stretch   Cross friction w/wo hawkgrip  10'  5x R Pt is ticklish   K tape                                       Pt. Education: 10/21: Reviewed care of the tape with the patient and patient knows to remove tape if pain increases or if she notices a skin sensitivity or allergy to the tape. Patient verbalized understanding of all instructions. -pt educated on diagnosis, prognosis and expectations for rehab  -all pt questions were answered    Home Exercise Program:  10/26: TB clam, SL hip abd, LE circles    Access Code: [email or web address]  URL: [email or web address]  Date: 10/12/2021  Prepared by: Hubert Winkler    Exercises  Supine Bridge - 2 x daily - 7 x weekly - 2 sets - 10 reps  Supine Hip Adductor Stretch - 1 x daily - 7 x weekly - 1 sets - 2 reps - 20 hold  Clamshell - 1 x daily - 7 x weekly - 1 sets - 10 reps - 1 hold  Hip Flexor Stretch on Step - 2 x daily - 7 x weekly - 1 sets - 2 reps - 30 hold  Hamstring stretch on steps - 1 x daily - 7 x weekly - 1 sets - 1 reps - 30 hold    Therapeutic Exercise and NMR EXR  [x] (48472) Provided verbal/tactile cueing for activities related to strengthening, flexibility, endurance, ROM for improvements in LE, proximal hip, and core control with self care, mobility, lifting, ambulation.  [] (94058) Provided verbal/tactile cueing for activities related to improving balance, coordination, kinesthetic sense, posture, motor skill, proprioception  to assist with LE, proximal hip, and core control in self care, mobility, lifting, ambulation and eccentric single leg control.      NMR and Therapeutic Activities:    [x] (94230 or 53852) Provided verbal/tactile cueing for activities related to improving balance, coordination, kinesthetic sense, posture, motor skill, proprioception and motor activation to allow for proper function of core, proximal hip and LE with self care and ADLs  [] (66107) Gait Re-education- Provided training and instruction to the patient for proper LE, core and proximal hip recruitment and positioning and eccentric body weight control with ambulation re-education including up and down stairs     Home Exercise Program:    [x] (13028) Reviewed/Progressed HEP activities related to strengthening, flexibility, endurance, ROM of core, proximal hip and LE for functional self-care, mobility, lifting and ambulation/stair navigation   [] (34262)Reviewed/Progressed HEP activities related to improving balance, coordination, kinesthetic sense, posture, motor skill, proprioception of core, proximal hip and LE for self care, mobility, lifting, and ambulation/stair navigation      Manual Treatments:  PROM / STM / Oscillations-Mobs:  G-I, II, III, IV (PA's, Inf., Post.)  [x] (59268) Provided manual therapy to mobilize LE, proximal hip and/or LS spine soft tissue/joints for the purpose of modulating pain, promoting relaxation, increasing ROM, reducing/eliminating soft tissue swelling/inflammation/restriction, improving soft tissue extensibility and allowing for proper ROM for normal function with self care, mobility, lifting and ambulation. Modalities:  [] (33205) Vasopneumatic compression: Utilized vasopneumatic compression to decrease edema / swelling for the purpose of improving mobility and quad tone / recruitment which will allow for increased overall function including but not limited to self-care, transfers, ambulation, and ascending / descending stairs. Modalities: NA this date    Charges:  Timed Code Treatment Minutes: 50   Total Treatment Minutes: 50     [] EVAL - LOW (91078)   [] EVAL - MOD (72475)  [] EVAL - HIGH (54254)  [] RE-EVAL (44591)  [x] TE (56822) x 1    [] Ionto (06230)  [] NMR (12979) x      [] Vaso (62637)  [x] Manual (90826) x 1     [] Ultrasound  [x] TA (10792) x  1    [] Mech Traction (14026)  [] Gait Training x     [] ES (un) (36001):   [] Aquatic therapy x   [] Other:   [] Group:     GOALS:   Patient stated goal:improve or resolve pain  [x] Progressing: [] Met: [] Not Met: [] Adjusted     Therapist goals for Patient:   Short Term Goals: To be achieved in: 2 weeks  1. Independent in HEP and progression per patient tolerance, in order to prevent re-injury. [x] Progressing: [x] Met: [] Not Met: [] Adjusted  2. Patient will have a decrease in pain to facilitate improvement in movement, function, and ADLs as indicated by Functional Deficits. [x] Progressing: [x] Met: [] Not Met: [] Adjusted     Long Term Goals: To be achieved in: 4-6 weeks  1. Disability index score of 10% or less for the LEFS to assist with reaching prior level of function. [x] Progressing: [] Met: [] Not Met: [] Adjusted  2. Patient will demonstrate increased R hip AROM/flexibility to WNLs to allow for proper joint functioning as indicated by patients Functional Deficits. [x] Progressing: [] Met: [] Not Met: [] Adjusted  3. Jump and Strength   []  Stage 4: Dynamic Strength and Agility   []  Stage 5: Sport Specific Training     []  Ready to Return to Play, Meets All Above Stages   []  Not Ready for Return to Sports   Comments:            Plan for next treatment session:     PLAN:F/U in 2 weeks   [] Continue per plan of care [] Alter current plan (see comments)  [] Plan of care initiated [x] Hold  [] Discharge    Electronically signed by: Tenzin Sorenson, PT MPT [id number]

## 2022-01-10 ENCOUNTER — HOSPITAL ENCOUNTER (OUTPATIENT)
Dept: MAMMOGRAPHY | Age: 42
Discharge: HOME | End: 2022-01-10
Payer: COMMERCIAL

## 2022-01-10 VITALS — HEIGHT: 64 IN | BODY MASS INDEX: 32.78 KG/M2 | WEIGHT: 192 LBS

## 2022-01-10 DIAGNOSIS — Z12.31 ENCOUNTER FOR SCREENING MAMMOGRAM FOR BREAST CANCER: ICD-10-CM

## 2022-01-10 PROCEDURE — 77063 BREAST TOMOSYNTHESIS BI: CPT

## 2022-03-21 ENCOUNTER — OFFICE VISIT (OUTPATIENT)
Dept: BARIATRICS/WEIGHT MGMT | Age: 42
End: 2022-03-21

## 2022-03-21 VITALS
HEIGHT: 64 IN | BODY MASS INDEX: 33.77 KG/M2 | RESPIRATION RATE: 18 BRPM | DIASTOLIC BLOOD PRESSURE: 81 MMHG | SYSTOLIC BLOOD PRESSURE: 119 MMHG | OXYGEN SATURATION: 100 % | HEART RATE: 90 BPM | WEIGHT: 197.8 LBS

## 2022-03-21 DIAGNOSIS — E66.9 OBESITY (BMI 30.0-34.9): Primary | ICD-10-CM

## 2022-03-21 DIAGNOSIS — K21.9 CHRONIC GERD: ICD-10-CM

## 2022-03-21 PROCEDURE — 99999 PR OFFICE/OUTPT VISIT,PROCEDURE ONLY: CPT | Performed by: FAMILY MEDICINE

## 2022-03-21 NOTE — PROGRESS NOTES
Rozina Abdi is a 39 y.o. female with a date of birth of 1980. Vitals:    03/21/22 0858   BP: 119/81   Pulse: 90   Resp: 18   SpO2: 100%   Weight: 197 lb 12.8 oz (89.7 kg)   Height: 5' 4\" (1.626 m)    BMI: Body mass index is 33.95 kg/m². Obesity Classification: Class I    Weight History: Wt Readings from Last 3 Encounters:   03/21/22 197 lb 12.8 oz (89.7 kg)   01/10/22 192 lb (87.1 kg)   10/05/21 187 lb (84.8 kg)       Patient's lowest adult weight was 173 lb at age 40. Patient's highest adult weight is current weight     Patient has participated in the following weight loss programs: Weight Watchers Anonymous and MD supervised. Patient has not participated in meal replacement/liquid diets. Patient has participated in weight loss medications. Qsymia a few years ago    Patient is not lactose intolerant. Patient does not have Scientology/cultural food concerns. Patient does not have food allergies. Pt has Crohn's Disease but no recent flares and well managed past several years. When eating very large amounts of fibrous produce may have discomfort afterwards. 24 hour recall/food frequency chart:  Breakfast: yes. Nothing or a bar, ghoetta and [place] toast  Snack: yes. Soft pretzel  Lunch: skips on weekends but pack during the week. Leftovers or sandwich and chips  Snack: no.   Dinner: yes. Cheeseburger and fries  Snack: no. May have ice cream     Drinks throughout the day: water, sf redbull in the morning, can of pop half the week, sparkling water, sweet tea once per week. Do you drink alcohol? yes. How often/how much alcohol do you drink: 2 cocktails or cider per week when going out. Patient does not meet the criteria for binge eating disorder. Patient does have grazing. Patient does not have night eating. Patient does have a history of emotional eating or eating out of boredom.       Goals  Weight: 160  Health Improvement: reflux    Assessment  Nutritional Needs: RMR=(9.99 x 89.7kg) + (6.25 x 162.6cm) - (4.92 x 41 y.o.) -161= 1551 kcal x 1.3 (light activity factor)= 2016 kcal - 750 (for 1.5 lb weight loss/week)= 1266 kcal.    Plan  Plan/Recommendations: General weight loss/lifestyle modification strategies discussed (elicit support from others; identify saboteurs; non-food rewards, etc). Regular aerobic exercise program discussed. Identify ways to set up kitchen for success and triggers for emotional eating. Increase fluids slowly and fruit/veg intake slowly as tolerated. 1200LCMP  Optifast:  no  Diet Medications:  Pt is interested in using Qsymia. PES Statement:  Overweight/Obesity related to excessive energy and refined carbohydrate intake as evidenced by BMI. Body mass index is 33.95 kg/m². Will follow up as necessary.     David Merlos RD, LD

## 2022-03-31 ENCOUNTER — TELEMEDICINE (OUTPATIENT)
Dept: BARIATRICS/WEIGHT MGMT | Age: 42
End: 2022-03-31
Payer: COMMERCIAL

## 2022-03-31 DIAGNOSIS — Z71.3 DIETARY COUNSELING AND SURVEILLANCE: ICD-10-CM

## 2022-03-31 DIAGNOSIS — E66.9 CLASS 1 OBESITY: Primary | ICD-10-CM

## 2022-03-31 PROCEDURE — 99203 OFFICE O/P NEW LOW 30 MIN: CPT | Performed by: FAMILY MEDICINE

## 2022-03-31 PROCEDURE — G8427 DOCREV CUR MEDS BY ELIG CLIN: HCPCS | Performed by: FAMILY MEDICINE

## 2022-03-31 ASSESSMENT — ENCOUNTER SYMPTOMS
WHEEZING: 0
EYE PAIN: 0
ABDOMINAL DISTENTION: 0
ABDOMINAL PAIN: 0
SHORTNESS OF BREATH: 0
NAUSEA: 0
VOMITING: 0
CHOKING: 0
CHEST TIGHTNESS: 0
DIARRHEA: 0
CONSTIPATION: 0
PHOTOPHOBIA: 0
BLOOD IN STOOL: 0
APNEA: 0
COUGH: 0

## 2022-03-31 NOTE — PROGRESS NOTES
Patient: Rebeca Mitchell     Encounter Date: 3/31/2022    YOB: 1980               Age: 39 y.o. Patient identification was verified at the start of the visit. Patient-Reported Vitals 3/31/2022   Patient-Reported Weight 195   Patient-Reported Height 54   Patient-Reported Systolic 047   Patient-Reported Diastolic 80   Patient-Reported Pulse 87   Patient-Reported Temperature 98.4   Patient-Reported SpO2 99         BP Readings from Last 1 Encounters:   03/21/22 119/81       BMI Readings from Last 1 Encounters:   03/21/22 33.95 kg/m²       Pulse Readings from Last 1 Encounters:   03/21/22 90                                              Wt Readings from Last 3 Encounters:   03/21/22 197 lb 12.8 oz (89.7 kg)   01/10/22 192 lb (87.1 kg)   10/05/21 187 lb (84.8 kg)       Chief Complaint   Patient presents with    Bariatric, Initial Visit     MWCAROLE- NP           HPI:    39 y.o. [facility]. The patient's medical history is significant for class I obesity. The patient has a long-standing history of obesity which started gradually. The problem is mild. The patient has been gaining weight. Risk factors include annual weight gain of >2 lbs (1 kg)/ year and sedentary lifestyle. Aggravating factors include poor diet and lack of physical activity. The patient has tried various diet/exercise plans which have been ineffective in the long-run. She is interested in aom to help with appetite regulation. Was on Qsymia several years ago. Good response to prior tx. Triggers for weight gain? [x] Stress   [] Illness   [] Medications   [] Travel  []Injury     [] Nightshift work   [] Insomnia  [] No specific triggers   [] Other    Food triggers:   [x] Stress   [] Boredom   [] Fast food   [x] Eating out   [] Seeking reward   [] Social     Have you ever taken medications to help you lose weight?    [x] Yes  [] No    Have you ever been on a meal replacement program?  [] Yes  [x] No        The patient denies any significant cardiac or psychiatric disease. The patient denies a history thyroid disease. The patient denies a history of glaucoma. The patient denies a history of seizure disorders/epiliepsy. Dietitian's assessment reviewed and addressed with patient. Reviewed:  [x] Nutrition and the importance of regular protein intake  [x] Hidden CHO/carbohydrate sources  [x] Alcohol use  [x] Tobacco use  [x] Drug use- Denies   [x] Importance of exercise and reducing sedentary time        Controlled Substance Monitoring:     RX Monitoring 6/11/2018   Attestation The Prescription Monitoring Report for this patient was reviewed today. Periodic Controlled Substance Monitoring Possible medication side effects, risk of tolerance/dependence & alternative treatments discussed.         Allergies   Allergen Reactions    Ciprofloxacin Anaphylaxis    Infliximab Anaphylaxis    Remicade [Infliximab Injection]     Enbrel [Etanercept] Other (See Comments)     Rectal abscess     Humira [Adalimumab] Other (See Comments)     Ineffective         Current Outpatient Medications:     pantoprazole (PROTONIX) 40 MG tablet, TK 1 T PO D, Disp: , Rfl:     mercaptopurine (PURINETHOL) 50 MG chemo tablet, Take 100 mg by mouth daily, Disp: , Rfl:     metroNIDAZOLE (FLAGYL) 500 MG tablet, Take 500 mg by mouth 3 times daily, Disp: , Rfl:     vitamin D (ERGOCALCIFEROL) 25191 units CAPS capsule, Take 1 capsule by mouth every 14 days Then take vit D3 OTC 4,000 units daily with the largest meal of the day, Disp: 12 capsule, Rfl: 1    Insulin Syringe-Needle U-100 31G X 5/16\" 1 ML MISC, To use with twice a week with methotrexate subcutaneous, Disp: 60 each, Rfl: 1    certolizumab pegol (CIMZIA PREFILLED) 2 X 200 MG/ML KIT injection, Inject 200 mg into the skin every 14 days, Disp: 1 kit, Rfl: 11    omeprazole (PRILOSEC) 40 MG delayed release capsule, Take 40 mg by mouth, Disp: , Rfl:    Cholecalciferol (VITAMIN D3) 1000 units TABS, Take 2,000 Units by mouth daily, Disp: , Rfl:     EPINEPHrine (EPIPEN) 0.3 MG/0.3ML LOBO injection, Inject 0.3 mLs into the muscle once as needed for 1 dose., Disp: 1 Device, Rfl: 3    Patient Active Problem List   Diagnosis    Ankylosing spondylitis ([address]    Thoracic or lumbosacral neuritis or radiculitis, unspecified    Salmonella infection    Aseptic necrosis of bone (HCC)    Pain in joint, pelvic region and thigh    Crohn's disease ([address]    Acute gastritis    Loss of weight    Abdominal pain, epigastric    Depressive disorder, not elsewhere classified    Pleurisy    Other and unspecified hyperlipidemia    Other acne    Other specified nutritional anemias    Malaise and fatigue    Other symptoms involving digestive system(787.99)    Abnormal screening cardiac CT    Other general medical examination for administrative purposes    Encounter for long-term (current) use of other medications    Tachycardia    Ulcerative colitis ([address]    Perirectal fistula    Primary localized osteoarthrosis, pelvic region and thigh    History of total hip replacement, right    Acute pharyngitis    High risk medication use    Crohn's disease of both small and large intestine with rectal bleeding ([address]    Ankylosing spondylitis of lumbosacral region ([address]    Chronic pain    Encounter for therapeutic drug monitoring    TMJ locking    Chronic GERD       Past Medical History:   Diagnosis Date    Ankylosing spondylitis ([address]     Arthritis     Crohn's     [place]    Fistula     RECTAL    GERD (gastroesophageal reflux disease)     Iritis 2010    Microcytic anemia     Umbilical hernia        Past Surgical History:   Procedure Laterality Date    ABSCESS DRAINAGE  9-2011    RECTAL    COLONOSCOPY      COLONOSCOPY  11-    COLONOSCOPY  12/2011    JOINT REPLACEMENT Right     HIP    OTHER SURGICAL HISTORY  2007    Terminal ileum resection     RECTAL SURGERY  07/31/2012    RECTAL ADVANCEMENT FLAP    SIGMOIDOSCOPY  9/12/12    FLEXIBLE    SIGMOIDOSCOPY  6/7/2017    flexible sigmoidoscopy    SMALL INTESTINE SURGERY      ileocecectomy    UPPER GASTROINTESTINAL ENDOSCOPY         Family History   Problem Relation Age of Onset    Breast Cancer Mother     Cancer Mother     High Blood Pressure Father     High Cholesterol Father     Diabetes Father     Cancer Paternal Aunt         colon    Diabetes Maternal Grandmother     Cancer Maternal Grandmother         colon    Cancer Maternal Grandfather         colon    High Blood Pressure Paternal Grandmother     Cancer Paternal Grandmother         colon    Breast Cancer Maternal Aunt        Review of Systems   Constitutional: Negative for fatigue. Eyes: Negative for photophobia, pain and visual disturbance. Respiratory: Negative for apnea, cough, choking, chest tightness, shortness of breath and wheezing. Cardiovascular: Negative for chest pain, palpitations and leg swelling. Gastrointestinal: Negative for abdominal distention, abdominal pain, blood in stool, constipation, diarrhea, nausea and vomiting. Endocrine: Negative for cold intolerance and heat intolerance. Musculoskeletal: Negative for arthralgias and myalgias. Skin: Negative for rash. Neurological: Negative for dizziness, tremors, syncope, weakness, numbness and headaches. Psychiatric/Behavioral: Negative for agitation, confusion, decreased concentration, dysphoric mood, hallucinations, sleep disturbance and suicidal ideas. The patient is not nervous/anxious and is not hyperactive. Physical Exam  Constitutional:       Appearance: She is well-developed. HENT:      Head: Normocephalic. Eyes:      Conjunctiva/sclera: Conjunctivae normal.   Abdominal:      General: Abdomen is protuberant. Musculoskeletal:         General: No swelling.    Neurological:      Mental Status: She is alert and oriented to person, place, and time.   Psychiatric:         Mood and Affect: Mood normal.         Behavior: Behavior normal.         Thought Content: Thought content normal.         Judgment: Judgment normal.         Hospital Outpatient Visit on 04/22/2021   Component Date Value Ref Range Status    Quantiferon TB Minus NIL 04/22/2021 Negative  Negative Final    Comment: Interpretive Data: Quantiferon TB Gold Plus  Interferon gamma release is measured for specimens from each of the  four  collection tubes. A qualitative result (Negative, Positive, or  Indeterminate)  is based on interpretation of the four values, NIL, MITOGEN minus NIL  (MITOGEN-NIL), TB1 minus NIL (TB1-NIL), and TB2 minus NIL (TB2-NIL). The NIL  value represents nonspecific reactivity produced by the patient  specimen. The  MITOGEN-NIL value serves as the positive control for the patient  specimen,  demonstrating successful lymphocyte activity. The TB1-NIL tube  specifically  detects CD4+ lymphocyte reactivity, specifically stimulated by the TB1  antigens. The TB2-NIL tube detects both CD4+ and CD8+ lymphocyte  reactivity,  stimulated by TB2 antigens. An overall Negative result does not  completely  rule out TB infection. A false-positive result in the absence of other clinical evidence of TB  infection is not uncommon. Refer to: Updated Guidelines for Using  Interferon  Gamma Relea                           se Assays to Detect Mycobacterium tuberculosis Infection ---  [place], 2010  ([email or web address]),  for more information concerning test performance in low-prevalence  populations and use in occupational screening.       Quantiferon TB1 Minus NIL 04/22/2021 0.00  0.00 - 0.34 IU/mL Final    Quantiferon TB2 Minus NIL 04/22/2021 0.24  0.00 - 0.34 IU/mL Final    QuantiFERON Mitogen 04/22/2021 7.26  IU/mL Final    QuantiFERON Nil 04/22/2021 0.43  IU/mL Final    Comment: Performed By: David Marie  [address] [place] [address] [address] [facility]  : Cristobal Wade. Marlin Dias MD           Assessment and Plan:    1. Class 1 obesity  Heavily counseled on the importance of therapeutic lifestyle changes through diet and exercise. The patient understands that the goal of treatment is to reach and stay at a healthy weight. The initial treatment goal is to lose at least 5-10% of her body weight in 12 weeks. This will require changes in eating habits, increased physical activity, and behavior changes. Counseled on low carb/vinny diet. Patient handouts and education material provided and reviewed in detail with the patient. All questions answered. Encouraged patient to keep a food journal and to bring it to her next visit. Discussed available treatment options in addition to lifestyle changes including medications or OPTIFAST. She is most interested in anti-obesity medications. Qsymia may be recommended at the next visit depending on her progress and lab results. she understands that medications are most effective as part of a comprehensive treatment plan that includes nutrition, physical activity, and behavior modification. The patient also understands that she will need close follow-ups every 2-4 weeks if she starts treatment. Follow-up in 2 weeks to discuss lab results and treatment plan. Patient advised that it is her responsibility to follow up on any ordered tests/lab results. Patient understands and agrees with the plan. - TSH with Reflex; Future  - Vitamin B12 & Folate; Future  - Vitamin D 25 Hydroxy; Future  - EKG 12 Lead; Future  - Hemoglobin A1C; Future  - Lipid Panel; Future  - Comprehensive Metabolic Panel; Future  - CBC; Future    2.  Dietary counseling and surveillance  1200-Vinny/low carb meal plan           Nutrition:  [] LCHF/Ketogenic [x] Low carb/low-calorie diet [] Low-calorie diet     []Maintenance        FITTE:   [x] Cardio [] Resistance/stength exercises   [x] ACSM recommendations (150 minutes/week)           Behavior:   [x] Motivational interviewing performed    [] Referral for counseling  [x] Discussed strategies to overcome habits/challenges for focus      [x] Stress management   [x] Stimulus control  [] Sleep hygiene      Orders Placed This Encounter   Procedures    TSH with Reflex     Standing Status:   Future     Standing Expiration Date:   3/31/2023    Vitamin B12 & Folate     Standing Status:   Future     Standing Expiration Date:   3/31/2023    Vitamin D 25 Hydroxy     Standing Status:   Future     Standing Expiration Date:   3/31/2023    Hemoglobin A1C     Standing Status:   Future     Standing Expiration Date:   3/31/2023    Lipid Panel     Standing Status:   Future     Standing Expiration Date:   4/30/2022     Order Specific Question:   Is Patient Fasting?/# of Hours     Answer:   8 hours    Comprehensive Metabolic Panel     Standing Status:   Future     Standing Expiration Date:   3/31/2023    CBC     Standing Status:   Future     Standing Expiration Date:   3/31/2023    EKG 12 Lead     Standing Status:   Future     Standing Expiration Date:   4/30/2022     Order Specific Question:   Reason for Exam?     Answer: Other       No follow-ups on file. Wally Butcher is a 39 y.o. female being evaluated by a Virtual Visit (video visit) encounter to address concerns as mentioned above. A caregiver was present when appropriate. Due to this being a TeleHealth encounter (During ZREZG-33 public health emergency), evaluation of the following organ systems was limited: Vitals/Constitutional/EENT/Resp/CV/GI//MS/Neuro/Skin/Heme-Lymph-Imm. Pursuant to the emergency declaration under the [address] [place] [address], [address] [place] [address] and the [employer] and [employer] Act, this Virtual Visit was conducted with patient's (and/or legal guardian's) consent, to reduce the patient's risk of exposure to COVID-19 and provide necessary medical care.   The patient (and/or legal guardian) has also been advised to contact this office for worsening conditions or problems, and seek emergency medical treatment and/or call 911 if deemed necessary. Services were provided through a video synchronous discussion virtually to substitute for in-person clinic visit. Patient and provider were located at their individual homes. --Rajan Ware MD on 3/31/2022 at 1:17 PM    An electronic signature was used to authenticate this note.

## 2022-04-01 DIAGNOSIS — E66.9 CLASS 1 OBESITY: ICD-10-CM

## 2022-04-01 LAB
A/G RATIO: 1.6 (ref 1.1–2.2)
ALBUMIN SERPL-MCNC: 4.1 G/DL (ref 3.4–5)
ALP BLD-CCNC: 44 U/L (ref 40–129)
ALT SERPL-CCNC: 33 U/L (ref 10–40)
ANION GAP SERPL CALCULATED.3IONS-SCNC: 14 MMOL/L (ref 3–16)
AST SERPL-CCNC: 26 U/L (ref 15–37)
BILIRUB SERPL-MCNC: 1.1 MG/DL (ref 0–1)
BUN BLDV-MCNC: 9 MG/DL (ref 7–20)
CALCIUM SERPL-MCNC: 9.1 MG/DL (ref 8.3–10.6)
CHLORIDE BLD-SCNC: 104 MMOL/L (ref 99–110)
CHOLESTEROL, TOTAL: 131 MG/DL (ref 0–199)
CO2: 24 MMOL/L (ref 21–32)
CREAT SERPL-MCNC: 0.7 MG/DL (ref 0.6–1.1)
FOLATE: 5.62 NG/ML (ref 4.78–24.2)
GFR AFRICAN AMERICAN: >60
GFR NON-AFRICAN AMERICAN: >60
GLUCOSE BLD-MCNC: 92 MG/DL (ref 70–99)
HCT VFR BLD CALC: 40 % (ref 36–48)
HDLC SERPL-MCNC: 56 MG/DL (ref 40–60)
HEMOGLOBIN: 13.3 G/DL (ref 12–16)
LDL CHOLESTEROL CALCULATED: 60 MG/DL
MCH RBC QN AUTO: 29.3 PG (ref 26–34)
MCHC RBC AUTO-ENTMCNC: 33.3 G/DL (ref 31–36)
MCV RBC AUTO: 87.7 FL (ref 80–100)
PDW BLD-RTO: 13.8 % (ref 12.4–15.4)
PLATELET # BLD: 225 K/UL (ref 135–450)
PMV BLD AUTO: 9.1 FL (ref 5–10.5)
POTASSIUM SERPL-SCNC: 4.5 MMOL/L (ref 3.5–5.1)
RBC # BLD: 4.56 M/UL (ref 4–5.2)
SODIUM BLD-SCNC: 142 MMOL/L (ref 136–145)
TOTAL PROTEIN: 6.6 G/DL (ref 6.4–8.2)
TRIGL SERPL-MCNC: 74 MG/DL (ref 0–150)
TSH REFLEX: 0.86 UIU/ML (ref 0.27–4.2)
VITAMIN B-12: 626 PG/ML (ref 211–911)
VITAMIN D 25-HYDROXY: 18.6 NG/ML
VLDLC SERPL CALC-MCNC: 15 MG/DL
WBC # BLD: 5.8 K/UL (ref 4–11)

## 2022-04-02 LAB
ESTIMATED AVERAGE GLUCOSE: 108.3 MG/DL
HBA1C MFR BLD: 5.4 %

## 2022-04-08 ENCOUNTER — HOSPITAL ENCOUNTER (OUTPATIENT)
Age: 42
Discharge: HOME | End: 2022-04-08
Payer: COMMERCIAL

## 2022-04-08 DIAGNOSIS — E66.9 CLASS 1 OBESITY: ICD-10-CM

## 2022-04-08 LAB
EKG ATRIAL RATE: 89 BPM
EKG DIAGNOSIS: NORMAL
EKG P AXIS: 38 DEGREES
EKG P-R INTERVAL: 150 MS
EKG Q-T INTERVAL: 392 MS
EKG QRS DURATION: 84 MS
EKG QTC CALCULATION (BAZETT): 476 MS
EKG R AXIS: 22 DEGREES
EKG T AXIS: 16 DEGREES
EKG VENTRICULAR RATE: 89 BPM

## 2022-04-08 PROCEDURE — 93010 ELECTROCARDIOGRAM REPORT: CPT | Performed by: INTERNAL MEDICINE

## 2022-04-08 PROCEDURE — 93005 ELECTROCARDIOGRAM TRACING: CPT

## 2022-04-15 ENCOUNTER — TELEMEDICINE (OUTPATIENT)
Dept: BARIATRICS/WEIGHT MGMT | Age: 42
End: 2022-04-15
Payer: COMMERCIAL

## 2022-04-15 DIAGNOSIS — E66.9 CLASS 1 OBESITY: Primary | ICD-10-CM

## 2022-04-15 DIAGNOSIS — E55.9 VITAMIN D DEFICIENCY: ICD-10-CM

## 2022-04-15 DIAGNOSIS — Z71.3 DIETARY COUNSELING AND SURVEILLANCE: ICD-10-CM

## 2022-04-15 PROCEDURE — 99214 OFFICE O/P EST MOD 30 MIN: CPT | Performed by: FAMILY MEDICINE

## 2022-04-15 PROCEDURE — G8427 DOCREV CUR MEDS BY ELIG CLIN: HCPCS | Performed by: FAMILY MEDICINE

## 2022-04-15 RX ORDER — PHENTERMINE AND TOPIRAMATE 3.75; 23 MG/1; MG/1
1 CAPSULE, EXTENDED RELEASE ORAL DAILY
Qty: 14 CAPSULE | Refills: 0 | Status: SHIPPED | OUTPATIENT
Start: 2022-04-15 | End: 2022-04-29

## 2022-04-15 RX ORDER — ERGOCALCIFEROL 1.25 MG/1
50000 CAPSULE ORAL WEEKLY
Qty: 8 CAPSULE | Refills: 0 | Status: SHIPPED | OUTPATIENT
Start: 2022-04-15 | End: 2022-08-08

## 2022-04-15 ASSESSMENT — ENCOUNTER SYMPTOMS
SHORTNESS OF BREATH: 0
BLOOD IN STOOL: 0
CONSTIPATION: 0
WHEEZING: 0
CHOKING: 0
ABDOMINAL DISTENTION: 0
VOMITING: 0
ABDOMINAL PAIN: 0
APNEA: 0
COUGH: 0
CHEST TIGHTNESS: 0
NAUSEA: 0
EYE PAIN: 0
DIARRHEA: 0
PHOTOPHOBIA: 0

## 2022-04-15 NOTE — PROGRESS NOTES
Patient: Addie Hernadez                      Encounter Date: 4/15/2022    YOB: 1980                Age: 39 y.o. Chief Complaint   Patient presents with    Weight Management     F/u MWM         Patient identification was verified at the start of the visit. Patient-Reported Vitals 4/14/2022   Patient-Reported Weight 194   Patient-Reported Height 54   Patient-Reported Systolic 187   Patient-Reported Diastolic 84   Patient-Reported Pulse 94   Patient-Reported Temperature 98.6   Patient-Reported SpO2 99         BP Readings from Last 1 Encounters:   03/21/22 119/81       BMI Readings from Last 1 Encounters:   03/21/22 33.95 kg/m²       Pulse Readings from Last 1 Encounters:   03/21/22 90       Wt Readings from Last 3 Encounters:   03/21/22 197 lb 12.8 oz (89.7 kg)   01/10/22 192 lb (87.1 kg)   10/05/21 187 lb (84.8 kg)        Self-reported weight: 194 pounds (4/14)    HPI: 39 y.o. female with a long-standing history of obesity presents today for virtual video follow-up. she has lost 3 pounds since her last visit. Current treatment includes low carb/lobo diet. Food recall reviewed with the patient. Making better dietary choices. Motivated to continue losing weight. Interested in starting on aom. Labs and EKG completed and reviewed    Vit D 18.6  NSR with possible LAE          Allergies   Allergen Reactions    Ciprofloxacin Anaphylaxis    Infliximab Anaphylaxis    Remicade [Infliximab Injection]     Enbrel [Etanercept] Other (See Comments)     Rectal abscess     Humira [Adalimumab] Other (See Comments)     Ineffective         Current Outpatient Medications:     Phentermine-Topiramate (QSYMIA) 3.75-23 MG CP24, Take 1 capsule by mouth daily for 14 days. , Disp: 14 capsule, Rfl: 0    vitamin D (ERGOCALCIFEROL) 1.25 MG (06814 UT) CAPS capsule, Take 1 capsule by mouth once a week, Disp: 8 capsule, Rfl: 0    pantoprazole (PROTONIX) 40 MG tablet, TK 1 T PO D, Disp: , Rfl:     mercaptopurine (PURINETHOL) 50 MG chemo tablet, Take 100 mg by mouth daily, Disp: , Rfl:     metroNIDAZOLE (FLAGYL) 500 MG tablet, Take 500 mg by mouth 3 times daily, Disp: , Rfl:     vitamin D (ERGOCALCIFEROL) 99633 units CAPS capsule, Take 1 capsule by mouth every 14 days Then take vit D3 OTC 4,000 units daily with the largest meal of the day, Disp: 12 capsule, Rfl: 1    Insulin Syringe-Needle U-100 31G X 5/16\" 1 ML MISC, To use with twice a week with methotrexate subcutaneous, Disp: 60 each, Rfl: 1    certolizumab pegol (CIMZIA PREFILLED) 2 X 200 MG/ML KIT injection, Inject 200 mg into the skin every 14 days, Disp: 1 kit, Rfl: 11    omeprazole (PRILOSEC) 40 MG delayed release capsule, Take 40 mg by mouth, Disp: , Rfl:     Cholecalciferol (VITAMIN D3) 1000 units TABS, Take 2,000 Units by mouth daily, Disp: , Rfl:     EPINEPHrine (EPIPEN) 0.3 MG/0.3ML LOBO injection, Inject 0.3 mLs into the muscle once as needed for 1 dose., Disp: 1 Device, Rfl: 3    Patient Active Problem List   Diagnosis    Ankylosing spondylitis (HCC)    Thoracic or lumbosacral neuritis or radiculitis, unspecified    Salmonella infection    Aseptic necrosis of bone (HCC)    Pain in joint, pelvic region and thigh    Crohn's disease (HCC)    Acute gastritis    Loss of weight    Abdominal pain, epigastric    Depressive disorder, not elsewhere classified    Pleurisy    Other and unspecified hyperlipidemia    Other acne    Other specified nutritional anemias    Malaise and fatigue    Other symptoms involving digestive system(787.99)    Abnormal screening cardiac CT    Other general medical examination for administrative purposes    Encounter for long-term (current) use of other medications    Tachycardia    Ulcerative colitis ([facility] [address]    Perirectal fistula    Primary localized osteoarthrosis, pelvic region and thigh    History of total hip replacement, right    Acute pharyngitis    High risk medication use    Crohn's disease of both small and large intestine with rectal bleeding (HCC)    Ankylosing spondylitis of lumbosacral region ([address]    Chronic pain    Encounter for therapeutic drug monitoring    TMJ locking    Chronic GERD       Review of Systems   Constitutional: Negative for fatigue. Eyes: Negative for photophobia, pain and visual disturbance. Respiratory: Negative for apnea, cough, choking, chest tightness, shortness of breath and wheezing. Cardiovascular: Negative for chest pain, palpitations and leg swelling. Gastrointestinal: Negative for abdominal distention, abdominal pain, blood in stool, constipation, diarrhea, nausea and vomiting. Endocrine: Negative for cold intolerance and heat intolerance. Musculoskeletal: Negative for arthralgias and myalgias. Skin: Negative for rash. Neurological: Negative for dizziness, tremors, syncope, weakness, numbness and headaches. Psychiatric/Behavioral: Negative for agitation, confusion, decreased concentration, dysphoric mood, hallucinations, sleep disturbance and suicidal ideas. The patient is not nervous/anxious and is not hyperactive. Physical Exam  Constitutional:       Appearance: She is well-developed. HENT:      Head: Normocephalic. Eyes:      Conjunctiva/sclera: Conjunctivae normal.   Abdominal:      General: Abdomen is protuberant. Musculoskeletal:         General: No swelling. Neurological:      Mental Status: She is alert and oriented to person, place, and time. Psychiatric:         Mood and Affect: Mood normal.         Behavior: Behavior normal.         Thought Content:  Thought content normal.         Judgment: Judgment normal.         Hospital Outpatient Visit on 04/08/2022   Component Date Value Ref Range Status    Ventricular Rate 04/08/2022 89  BPM Final    Atrial Rate 04/08/2022 89  BPM Final    P-R Interval 04/08/2022 150  ms Final    QRS Duration 04/08/2022 84  ms Final    Q-T Interval 04/08/2022 392  ms Final    QTc Calculation (Bazett) 04/08/2022 476  ms Final    P Axis 04/08/2022 38  degrees Final    R Axis 04/08/2022 22  degrees Final    T Axis 04/08/2022 16  degrees Final    Diagnosis 04/08/2022 Normal sinus rhythmPossible Left atrial enlargementProlonged QTConfirmed by Dale Roldan ([id number]) on 4/8/2022 3:40:06 PM   Final         Assessment and Plan:  1. Class 1 obesity  The patient is an appropriate candidate for weight loss. Losing weight will help the patient avoid/improve obesity related comorbidities. Discussed risks, benefits and alternatives of Qsymia. Patient meets BMI criteria, confirm negative pregnancy status, denies any significant coronary artery disease, glaucoma or hyperthyroidism. she denies MAOI use within the past 14 days and has no known hypersensitivity to the sympathomimetic amines, kidney or liver impairment. Start Qsymia 3.75 mg/23 mg once daily in the morning for two weeks and then follow-up in two weeks to determine further dosing. Explained to patient that I will monitor her weight loss every 12 weeks and if she has not lost at least 5% of her body weight, that I will either increase the medication or discontinue it. Counseled patient on proper use and potential side effects. Explained to patient that the maximum dose of this medication will need to be tapered when she is ready to discontinue it. she understands that abrupt cessation of this dose may cause adverse reactions including seizures. she understands that it is her  responsibility to make sure that she does not run out of medications and to follow up to her appointments every 2-4 weeks as recommended. Heavily counseled on the importance of therapeutic lifestyle changes through diet and exercise. Discussed possible side effects of palpitations, irritability, paresthesias, dizziness, dysgeusia, insomnia, constipation and dry mouth. Patient is responsible for keeping her monthly appointments.  Failure to comply with her monthly visits will result in discontinuing Qsymia. Patient advised to report any side effects.     - Phentermine-Topiramate (QSYMIA) 3.75-23 MG CP24; Take 1 capsule by mouth daily for 14 days. Dispense: 14 capsule; Refill: 0    2. Dietary counseling and surveillance  1200-Vinny/low carb meal plan    3. Vitamin D deficiency  Start Vit D 50,000 IU weekly x 8 weeks. Nutrition Plan: [] LCHF/Ketogenic   [x] Modified low-calorie diet (low carb/low-vinny)               [] Low-calorie diet    [] Maintenance       []Other        Exercise: [x] Cardio     [] Resistance/strength training                       [x] ACSM recommendations (150 minutes/week in active weight loss)               Behavior: [x] Motivational interviewing performed    [] Referral for counseling                         [x] Discussed strategies to overcome habits/challenges for focus         [] Stress management   [x] Stimulus control         [] Sleep hygiene      Reviewed:  [x] Nutrition and the importance of regular protein intake  [x] Hidden carbohydrate sources  [x] Alcohol use  [x] Tobacco use   [x] Drug use- Denies  [x] Importance of exercise and reducingsedentary time  [x] Treatment consent- Patient understands and agrees with the treatment plan   [x] Proper use of medication and side effects  [x] OARRS report  [x] Labs  [x] EKG     Controlled Substance Monitoring:    RX Monitoring 6/11/2018   Attestation The Prescription Monitoring Report for this patient was reviewed today. Periodic Controlled Substance Monitoring Possible medication side effects, risk of tolerance/dependence & alternative treatments discussed.           Patient denies any history of cardiovascular disease (e.g., CAD, stroke, arrhythmias, CHF, uncontrolled HTN), seizure disorder, MAOI use within the last 2 weeks, hyperthyroidism, glaucoma, agitated states, history of drug abuse, pregnancy, nursing, known hypersensitivity to the prescribing meds, history of pancreatitis, or personal or family history of thyroid medullary cancer. Treatment start date: 4/18/22  12 weeks: 7/18/22  Starting weight: 194 pounds  Goal: At least 5% (9.5 pounds)    Key dietary points:    - Meats (preferably organic or grass fed) are great sources of protein and have no carbohydrates. - Recommend coconut, olive, avocado, or almond oils. - When buying dairy, choose regular or full fat options. - Choose vegetables that grow above ground as they are generally lower in carbohydrates and higher in fiber.  - Avoid starches such as bread, rice, potatoes, pasta and all sources of simple sugars (desserts, soda, breakfast cereals). - Choose beverages that are calorie and sugar free. Reminder regarding weight loss medications: You must be seen in office every 2-4 weeks to haveyour prescriptions refilled. If you are off of your medication for longer than 7 days, you will not be able to restart the medication for at least 6 months. Always call our office to report any side effects. Females, it is your responsibility to obtain negative pregnancy tests each month. No orders of the defined types were placed in this encounter. No follow-ups on file. Ozzie Llamas is a 39 y.o. female being evaluated by a Virtual Visit (video visit) encounter to address concerns as mentioned above. A caregiver was present when appropriate. Due to this being a TeleHealth encounter (During [facility]-31 public health emergency), evaluation of the following organ systems was limited: Vitals/Constitutional/EENT/Resp/CV/GI//MS/Neuro/Skin/Heme-Lymph-Imm. Pursuant to the emergency declaration under the [facility], [address] [place] [address] authority and the [employer] and [employer] Act, this Virtual Visit was conducted with patient's (and/or legal guardian's) consent, to reduce the patient's risk of exposure to COVID-19 and provide necessary medical care. The patient (and/or legal guardian) has also been advised to contact this office for worsening conditions or problems, and seek emergency medical treatment and/or call 911 if deemed necessary.

## 2022-05-05 ENCOUNTER — TELEMEDICINE (OUTPATIENT)
Dept: BARIATRICS/WEIGHT MGMT | Age: 42
End: 2022-05-05
Payer: COMMERCIAL

## 2022-05-05 DIAGNOSIS — E66.9 CLASS 1 OBESITY: Primary | ICD-10-CM

## 2022-05-05 DIAGNOSIS — Z71.3 DIETARY COUNSELING AND SURVEILLANCE: ICD-10-CM

## 2022-05-05 PROCEDURE — G8427 DOCREV CUR MEDS BY ELIG CLIN: HCPCS | Performed by: FAMILY MEDICINE

## 2022-05-05 PROCEDURE — 99213 OFFICE O/P EST LOW 20 MIN: CPT | Performed by: FAMILY MEDICINE

## 2022-05-05 RX ORDER — PHENTERMINE AND TOPIRAMATE 7.5; 46 MG/1; MG/1
1 CAPSULE, EXTENDED RELEASE ORAL DAILY
Qty: 30 CAPSULE | Refills: 0 | Status: SHIPPED | OUTPATIENT
Start: 2022-05-05 | End: 2022-05-26

## 2022-05-05 ASSESSMENT — ENCOUNTER SYMPTOMS
ABDOMINAL DISTENTION: 0
CHEST TIGHTNESS: 0
WHEEZING: 0
BLOOD IN STOOL: 0
CONSTIPATION: 0
CHOKING: 0
NAUSEA: 0
ABDOMINAL PAIN: 0
COUGH: 0
PHOTOPHOBIA: 0
VOMITING: 0
DIARRHEA: 0
SHORTNESS OF BREATH: 0
EYE PAIN: 0
APNEA: 0

## 2022-05-05 NOTE — PROGRESS NOTES
Patient: Addie Hernadez                      Encounter Date: 5/5/2022    YOB: 1980                Age: 39 y.o. Chief Complaint   Patient presents with    Weight Management     F/u MWM         Patient identification was verified at the start of the visit. Patient-Reported Vitals 5/4/2022   Patient-Reported Weight 190   Patient-Reported Height 54   Patient-Reported Systolic 891   Patient-Reported Diastolic 80   Patient-Reported Pulse 90   Patient-Reported Temperature 98.4   Patient-Reported SpO2 99         BP Readings from Last 1 Encounters:   03/21/22 119/81       BMI Readings from Last 1 Encounters:   03/21/22 33.95 kg/m²       Pulse Readings from Last 1 Encounters:   03/21/22 90           Wt Readings from Last 3 Encounters:   03/21/22 197 lb 12.8 oz (89.7 kg)   01/10/22 192 lb (87.1 kg)   10/05/21 187 lb (84.8 kg)     Self-reported weight: 194 pounds (4/14)    HPI: 39 y.o. female with a long-standing history of obesity presents today for virtual video follow-up. she has lost 4 pounds since her last visit. Current treatment includes Qsymia 3.75-23 mg daily. Tolerating it well. Food recall reviewed with the patient. Making better dietary choices. Happy with progress. Motivated to continue losing weight. Medication(s): Appetite well controlled? [x]Yes      []No    Focus:     [x]Good     []Fair     []Poor    Side effects? No        Any recent change in medication(s)? No         Allergies   Allergen Reactions    Ciprofloxacin Anaphylaxis    Infliximab Anaphylaxis    Remicade [Infliximab Injection]     Enbrel [Etanercept] Other (See Comments)     Rectal abscess     Humira [Adalimumab] Other (See Comments)     Ineffective         Current Outpatient Medications:     Phentermine-Topiramate (QSYMIA) 7.5-46 MG CP24, Take 1 capsule by mouth daily for 30 days. , Disp: 30 capsule, Rfl: 0    vitamin D (ERGOCALCIFEROL) 1.25 MG (24499 UT) CAPS capsule, Take 1 capsule by mouth once a week, Disp: 8 capsule, Rfl: 0    pantoprazole (PROTONIX) 40 MG tablet, TK 1 T PO D, Disp: , Rfl:     mercaptopurine (PURINETHOL) 50 MG chemo tablet, Take 100 mg by mouth daily, Disp: , Rfl:     metroNIDAZOLE (FLAGYL) 500 MG tablet, Take 500 mg by mouth 3 times daily, Disp: , Rfl:     vitamin D (ERGOCALCIFEROL) 11860 units CAPS capsule, Take 1 capsule by mouth every 14 days Then take vit D3 OTC 4,000 units daily with the largest meal of the day, Disp: 12 capsule, Rfl: 1    Insulin Syringe-Needle U-100 31G X 5/16\" 1 ML MISC, To use with twice a week with methotrexate subcutaneous, Disp: 60 each, Rfl: 1    certolizumab pegol (CIMZIA PREFILLED) 2 X 200 MG/ML KIT injection, Inject 200 mg into the skin every 14 days, Disp: 1 kit, Rfl: 11    omeprazole (PRILOSEC) 40 MG delayed release capsule, Take 40 mg by mouth, Disp: , Rfl:     Cholecalciferol (VITAMIN D3) 1000 units TABS, Take 2,000 Units by mouth daily, Disp: , Rfl:     EPINEPHrine (EPIPEN) 0.3 MG/0.3ML LOBO injection, Inject 0.3 mLs into the muscle once as needed for 1 dose., Disp: 1 Device, Rfl: 3    Patient Active Problem List   Diagnosis    Ankylosing spondylitis (HCC)    Thoracic or lumbosacral neuritis or radiculitis, unspecified    Salmonella infection    Aseptic necrosis of bone (HCC)    Pain in joint, pelvic region and thigh    Crohn's disease (HCC)    Acute gastritis    Loss of weight    Abdominal pain, epigastric    Depressive disorder, not elsewhere classified    Pleurisy    Other and unspecified hyperlipidemia    Other acne    Other specified nutritional anemias    Malaise and fatigue    Other symptoms involving digestive system(787.99)    Abnormal screening cardiac CT    Other general medical examination for administrative purposes    Encounter for long-term (current) use of other medications    Tachycardia    Ulcerative colitis ([facility] [address]    Perirectal fistula    Primary localized osteoarthrosis, pelvic region and thigh    History of total hip replacement, right    Acute pharyngitis    High risk medication use    Crohn's disease of both small and large intestine with rectal bleeding (HCC)    Ankylosing spondylitis of lumbosacral region ([address]    Chronic pain    Encounter for therapeutic drug monitoring    TMJ locking    Chronic GERD       Review of Systems   Constitutional: Negative for fatigue. Eyes: Negative for photophobia, pain and visual disturbance. Respiratory: Negative for apnea, cough, choking, chest tightness, shortness of breath and wheezing. Cardiovascular: Negative for chest pain, palpitations and leg swelling. Gastrointestinal: Negative for abdominal distention, abdominal pain, blood in stool, constipation, diarrhea, nausea and vomiting. Endocrine: Negative for cold intolerance and heat intolerance. Musculoskeletal: Negative for arthralgias and myalgias. Skin: Negative for rash. Neurological: Negative for dizziness, tremors, syncope, weakness, numbness and headaches. Psychiatric/Behavioral: Negative for agitation, confusion, decreased concentration, dysphoric mood, hallucinations, sleep disturbance and suicidal ideas. The patient is not nervous/anxious and is not hyperactive. Physical Exam  Constitutional:       Appearance: She is well-developed. HENT:      Head: Normocephalic. Eyes:      Conjunctiva/sclera: Conjunctivae normal.   Abdominal:      General: Abdomen is protuberant. Musculoskeletal:         General: No swelling. Neurological:      Mental Status: She is alert and oriented to person, place, and time. Psychiatric:         Mood and Affect: Mood normal.         Behavior: Behavior normal.         Thought Content:  Thought content normal.         Judgment: Judgment normal.         Hospital Outpatient Visit on 04/08/2022   Component Date Value Ref Range Status    Ventricular Rate 04/08/2022 89  BPM Final    Atrial Rate 04/08/2022 89  BPM Final    P-R Interval 04/08/2022 150  ms Final    QRS Duration 04/08/2022 84  ms Final    Q-T Interval 04/08/2022 392  ms Final    QTc Calculation (Bazett) 04/08/2022 476  ms Final    P Axis 04/08/2022 38  degrees Final    R Axis 04/08/2022 22  degrees Final    T Axis 04/08/2022 16  degrees Final    Diagnosis 04/08/2022 Normal sinus rhythmPossible Left atrial enlargementProlonged QTConfirmed by Malina Holguin ([id number]) on 4/8/2022 3:40:06 PM   Final         Assessment and Plan:  1. Class 1 obesity  Once again, discussed risks and benefits of Qsymia. Patient meets BMI criteria, confirms negative pregnancy status monthly (when applicable), denies glaucoma, kidney or liver impairment, hyperthyroidism, MAOI use within the past 14 days and has no known hypersensitivity to the sympathomimetic amines. Increase Qsymia to 7.5-46 mg daily. Counseled on proper use and potential side effects. Explained to patient this medication will need to be tapered when she is ready to discontinue it. she understands that abrupt cessation of this dose may cause adverse reactions including seizures. she understands that it is her responsibility to make sure that she does not run out of medications and to follow up to her appointments every 24 weeks as recommended. Heavily counseled on the importance of therapeutic lifestyle changes through diet and exercise. - Phentermine-Topiramate (QSYMIA) 7.5-46 MG CP24; Take 1 capsule by mouth daily for 30 days. Dispense: 30 capsule; Refill: 0    2. Dietary counseling and surveillance  1200-Vinny/low carb meal plan.           Nutrition Plan: [] LCHF/Ketogenic   [x] Modified low-calorie diet (low carb/low-vinny)               [] Low-calorie diet    [] Maintenance       []Other        Exercise: [x] Cardio     [] Resistance/strength training                       [x] ACSM recommendations (150 minutes/week in active weight loss)               Behavior: [x] Motivational interviewing performed    [] Referral for counseling [x] Discussed strategies to overcome habits/challenges for focus         [] Stress management   [x] Stimulus control         [] Sleep hygiene      Reviewed:  [x] Nutrition and the importance of regular protein intake  [x] Hidden carbohydrate sources  [x] Alcohol use  [x] Tobacco use   [x] Drug use- Denies  [x] Importance of exercise and reducing sedentary time  [x] Treatment consent- Patient understands and agrees with the treatment plan   [x] Proper use of medication and side effects  [x] OARRS report      Controlled Substance Monitoring:    RX Monitoring 6/11/2018   Attestation The Prescription Monitoring Report for this patient was reviewed today. Periodic Controlled Substance Monitoring Possible medication side effects, risk of tolerance/dependence & alternative treatments discussed. Patient denies any history of cardiovascular disease (e.g., CAD, stroke, arrhythmias, CHF, uncontrolled HTN), seizure disorder, MAOI use within the last 2 weeks, hyperthyroidism, glaucoma, agitated states, history of drug abuse, pregnancy, nursing, known hypersensitivity to the prescribing meds, history of pancreatitis, or personal or family history of thyroid medullary cancer. Treatment start date: 4/18/22  12 weeks: 7/18/22  Starting weight: 194 pounds  Goal: At least 5% (9.5 pounds)  Total weight loss: 4 pounds     Key dietary points:    - Meats (preferably organic or grass fed) are great sources of protein and have no carbohydrates. - Recommend coconut, olive, avocado, or almond oils. - When buying dairy, choose regular or full fat options. - Choose vegetables that grow above ground as they are generally lower in carbohydrates and higher in fiber.  - Avoid starches such as bread, rice, potatoes, pasta and all sources of simple sugars (desserts, soda, breakfast cereals). - Choose beverages that are calorie and sugar free. Reminder regarding weight loss medications:     You must be seen in office every 2-4 weeks to haveyour prescriptions refilled. If you are off of your medication for longer than 7 days, you will not be able to restart the medication for at least 6 months. Always call our office to report any side effects. Females, it is your responsibility to obtain negative pregnancy tests each month. No orders of the defined types were placed in this encounter. No follow-ups on file. Getachew Campbell is a 39 y.o. female being evaluated by a Virtual Visit (video visit) encounter to address concerns as mentioned above. A caregiver was present when appropriate. Due to this being a TeleHealth encounter (During [facility]-16 public health emergency), evaluation of the following organ systems was limited: Vitals/Constitutional/EENT/Resp/CV/GI//MS/Neuro/Skin/Heme-Lymph-Imm. Pursuant to the emergency declaration under the [address] [place] [address] authority and the [employer] Act, this Virtual Visit was conducted with patient's (and/or legal guardian's) consent, to reduce the patient's risk of exposure to COVID-19 and provide necessary medical care. The patient (and/or legal guardian) has also been advised to contact this office for worsening conditions or problems, and seek emergency medical treatment and/or call 911 if deemed necessary.

## 2022-05-10 ENCOUNTER — OFFICE VISIT (OUTPATIENT)
Dept: ORTHOPEDIC SURGERY | Age: 42
End: 2022-05-10
Payer: COMMERCIAL

## 2022-05-10 VITALS — BODY MASS INDEX: 33.63 KG/M2 | HEIGHT: 64 IN | WEIGHT: 197 LBS

## 2022-05-10 DIAGNOSIS — T84.84XA PAIN DUE TO RIGHT HIP JOINT PROSTHESIS, INITIAL ENCOUNTER (HCC): ICD-10-CM

## 2022-05-10 DIAGNOSIS — M16.12 PRIMARY OSTEOARTHRITIS OF LEFT HIP: ICD-10-CM

## 2022-05-10 DIAGNOSIS — R52 PAIN: Primary | ICD-10-CM

## 2022-05-10 DIAGNOSIS — Z96.641 PAIN DUE TO RIGHT HIP JOINT PROSTHESIS, INITIAL ENCOUNTER (HCC): ICD-10-CM

## 2022-05-10 PROCEDURE — 20611 DRAIN/INJ JOINT/BURSA W/US: CPT | Performed by: ORTHOPAEDIC SURGERY

## 2022-05-10 PROCEDURE — 99214 OFFICE O/P EST MOD 30 MIN: CPT | Performed by: ORTHOPAEDIC SURGERY

## 2022-05-10 RX ORDER — LIDOCAINE HYDROCHLORIDE 10 MG/ML
10 INJECTION, SOLUTION INFILTRATION; PERINEURAL ONCE
Status: COMPLETED | OUTPATIENT
Start: 2022-05-10 | End: 2022-05-10

## 2022-05-10 RX ORDER — BUPIVACAINE HYDROCHLORIDE 2.5 MG/ML
2.5 INJECTION, SOLUTION INFILTRATION; PERINEURAL ONCE
Status: COMPLETED | OUTPATIENT
Start: 2022-05-10 | End: 2022-05-10

## 2022-05-10 RX ORDER — BETAMETHASONE SODIUM PHOSPHATE AND BETAMETHASONE ACETATE 3; 3 MG/ML; MG/ML
12 INJECTION, SUSPENSION INTRA-ARTICULAR; INTRALESIONAL; INTRAMUSCULAR; SOFT TISSUE ONCE
Status: COMPLETED | OUTPATIENT
Start: 2022-05-10 | End: 2022-05-10

## 2022-05-10 RX ADMIN — LIDOCAINE HYDROCHLORIDE 10 MG: 10 INJECTION, SOLUTION INFILTRATION; PERINEURAL at 10:53

## 2022-05-10 RX ADMIN — BUPIVACAINE HYDROCHLORIDE 2.5 MG: 2.5 INJECTION, SOLUTION INFILTRATION; PERINEURAL at 10:53

## 2022-05-10 RX ADMIN — BETAMETHASONE SODIUM PHOSPHATE AND BETAMETHASONE ACETATE 12 MG: 3; 3 INJECTION, SUSPENSION INTRA-ARTICULAR; INTRALESIONAL; INTRAMUSCULAR; SOFT TISSUE at 10:53

## 2022-05-10 SDOH — HEALTH STABILITY: PHYSICAL HEALTH: ON AVERAGE, HOW MANY DAYS PER WEEK DO YOU ENGAGE IN MODERATE TO STRENUOUS EXERCISE (LIKE A BRISK WALK)?: 4 DAYS

## 2022-05-10 SDOH — HEALTH STABILITY: PHYSICAL HEALTH: ON AVERAGE, HOW MANY MINUTES DO YOU ENGAGE IN EXERCISE AT THIS LEVEL?: 60 MIN

## 2022-05-10 ASSESSMENT — SOCIAL DETERMINANTS OF HEALTH (SDOH)
WITHIN THE LAST YEAR, HAVE YOU BEEN KICKED, HIT, SLAPPED, OR OTHERWISE PHYSICALLY HURT BY YOUR PARTNER OR EX-PARTNER?: NO
WITHIN THE LAST YEAR, HAVE YOU BEEN AFRAID OF YOUR PARTNER OR EX-PARTNER?: NO
WITHIN THE LAST YEAR, HAVE YOU BEEN HUMILIATED OR EMOTIONALLY ABUSED IN OTHER WAYS BY YOUR PARTNER OR EX-PARTNER?: NO
WITHIN THE LAST YEAR, HAVE TO BEEN RAPED OR FORCED TO HAVE ANY KIND OF SEXUAL ACTIVITY BY YOUR PARTNER OR EX-PARTNER?: NO

## 2022-05-10 NOTE — PROGRESS NOTES
Dr Cassie Galindo      Date /Time 5/10/2022       11:39 AM EDT  Name Юлия Souza             1980   Location  [facility]  MRN [id number]                Chief Complaint   Patient presents with    Hip Pain     LEFT HIP         History of Present Illness    Юлия Souza is a 39 y.o. female who presents with  left hip pain. Sent in consultation by Donna Roblero MD,. Occupation: Physician assistant  Occupational activities: clerical work. Athletic/exercise activity: no sports. Injury Mechanism:  none. Worker's Comp. & legal issues:   none. Previous Treatments: Ice, Heat and NSAIDs    Patient presents to the office today for new problem. Patient here with a chief complaint of left hip pain. Patient's left hip has been painful for many years. She has had a previous total hip arthroplasty done by Dr. Chacha Pruitt in 2019 and has residual anterior hip pain patient's left hip is mostly painful over her groin. No injury or trauma. She does have increased pain with activities and improvement with rest.  She cannot take NSAIDs due to Crohn's disease and ulcerative colitis. In addition she does have a history of ankylosing spondylitis. Patient has had 2 intra-articular cortisone injections of the left hip.     Past History  Past Medical History:   Diagnosis Date    Ankylosing spondylitis ([address]     Arthritis     Crohn's     Stotz    Fistula     RECTAL    GERD (gastroesophageal reflux disease)     Iritis 2010    Microcytic anemia     Umbilical hernia      Past Surgical History:   Procedure Laterality Date    ABSCESS DRAINAGE  9-2011    RECTAL    COLONOSCOPY      COLONOSCOPY  11-    COLONOSCOPY  12/2011    JOINT REPLACEMENT Right     HIP    OTHER SURGICAL HISTORY  2007    Terminal ileum resection     RECTAL SURGERY  07/31/2012    RECTAL ADVANCEMENT FLAP    SIGMOIDOSCOPY  9/12/12    FLEXIBLE    SIGMOIDOSCOPY  6/7/2017    flexible sigmoidoscopy    SMALL INTESTINE SURGERY      ileocecectomy    UPPER GASTROINTESTINAL ENDOSCOPY       Family History   Problem Relation Age of Onset    Breast Cancer Mother     Cancer Mother     High Blood Pressure Father     High Cholesterol Father     Diabetes Father     Cancer Paternal Aunt         colon    Diabetes Maternal Grandmother     Cancer Maternal Grandmother         colon    Cancer Maternal Grandfather         colon    High Blood Pressure Paternal Grandmother     Cancer Paternal Grandmother         colon    Breast Cancer Maternal Aunt      Social History     Tobacco Use    Smoking status: Never Smoker    Smokeless tobacco: Never Used   Substance Use Topics    Alcohol use: No      Current Outpatient Medications on File Prior to Visit   Medication Sig Dispense Refill    Phentermine-Topiramate (QSYMIA) 7.5-46 MG CP24 Take 1 capsule by mouth daily for 30 days. 30 capsule 0    vitamin D (ERGOCALCIFEROL) 1.25 MG (49119 UT) CAPS capsule Take 1 capsule by mouth once a week 8 capsule 0    pantoprazole (PROTONIX) 40 MG tablet TK 1 T PO D      mercaptopurine (PURINETHOL) 50 MG chemo tablet Take 100 mg by mouth daily      metroNIDAZOLE (FLAGYL) 500 MG tablet Take 500 mg by mouth 3 times daily      vitamin D (ERGOCALCIFEROL) 58734 units CAPS capsule Take 1 capsule by mouth every 14 days Then take vit D3 OTC 4,000 units daily with the largest meal of the day 12 capsule 1    Insulin Syringe-Needle U-100 31G X 5/16\" 1 ML MISC To use with twice a week with methotrexate subcutaneous 60 each 1    certolizumab pegol (CIMZIA PREFILLED) 2 X 200 MG/ML KIT injection Inject 200 mg into the skin every 14 days 1 kit 11    omeprazole (PRILOSEC) 40 MG delayed release capsule Take 40 mg by mouth      Cholecalciferol (VITAMIN D3) 1000 units TABS Take 2,000 Units by mouth daily      EPINEPHrine (EPIPEN) 0.3 MG/0.3ML LOBO injection Inject 0.3 mLs into the muscle once as needed for 1 dose.  1 Device 3     No current facility-administered medications on file prior to visit. ASCVD 10-YEAR RISK SCORE  The 10-year ASCVD risk score (Chela Carney, et al., 2013) is: 0.2%    Values used to calculate the score:      Age: 39 years      Sex: Female      Is Non- : No      Diabetic: No      Tobacco smoker: No      Systolic Blood Pressure: 263 mmHg      Is BP treated: No      HDL Cholesterol: 56 mg/dL      Total Cholesterol: 131 mg/dL   . Review of Systems  10-point ROS is negative other than HPI. Physical Exam  Based off 1997 Exam Criteria  Ht 5' 4\" (1.626 m)   Wt 197 lb (89.4 kg)   LMP 05/07/2022   BMI 33.81 kg/m²      Constitutional:       General: He is not in acute distress. Appearance: Normal appearance. Cardiovascular:      Rate and Rhythm: Normal rate and regular rhythm. Pulses: Normal pulses. Pulmonary:      Effort: Pulmonary effort is normal. No respiratory distress. Neurological:      Mental Status: He is alert and oriented to person, place, and time. Mental status is at baseline.      Musculoskeletal:  Gait:  antalgic    Spine / Hip Exam:      RIGHT  LEFT    Lumbar Spine Exam  [x] All Neg    [x] All Neg     Straight leg raise  []  []Not tested   []  []Not tested    Clonus  []  []Not tested   []  []Not tested    Pain with motion  []  []Not tested   []  []Not tested    Radiculopathy  []  []Not tested   []  []Not tested    Paraspinal muscle tenderness  [] Paraspinal  []Midline   [] Paraspinal  []Midline   Sensation RIGHT  LEFT    L3  [x] Normal []Decreased    [x] Normal []Decreased   L4  [x] Normal  []Decreased   [x] Normal []Decreased   L5  [x] Normal []Decreased   [x] Normal []Decreased   S1  [x] Normal  []Decreased   [x] Normal []Decreased   Pelvis       Scoliosis  [x] Nml  [] Present     Leg-length discrepency  [] Equal  [x] Right longer   [] Left longer   Range of Motion Active Passive Active Passive   Hip Flexion 100  100    Abduction 30  30    External Rotation @ 90 flex 45 45    Internal Rotation @ 90 flex 30  0           Hip Impingement / Dysplasia  [x] All Neg  [] Not tested   [] All Neg  [] Not tested    Hip impingement test  []  []Not tested   [x]  []Not tested    C-sign  []  []Not tested   [x]  []Not tested    Anterior instability apprehension  []  []Not tested   []  []Not tested    Posterior instability apprehension  []  []Not tested   []  []Not tested    Uncontained Internal rotation  []  []Not tested  []  []Not tested          Abductors  [x] All Neg  [] Not tested   [x] All Neg  [] Not tested    Medius strength  []  []Not tested   []  []Not tested    Minimum strength  []  []Not tested   []  []Not tested    IT band tendonitis  []  []Not tested   []  []Not tested    Trochanteric tenderness  []  []Not tested  []  []Not tested   Sciatic neuropathic pain  []  []Not tested   []  []Not tested           Post-arthroplasty  [] All Neg  [] Not tested   [] All Neg  [] Not tested    Rectus tendonitis  [x]  []Not tested   []  []Not tested    Iliopsoas tendonitis       Start-up pain  []  []Not tested   []  []Not tested      Markedly positive Stinchfield and impingement signs on left hip. Right hip. Has markedly positive for rectus tendinitis and iliopsoas tendinitis. Well-healed incision anterior hip. Imaging    Left Hip: [facility] AT [place]  Radiographs: X-rays were ordered and reviewed of the left hip.  3 views. AP pelvis, lateral, and false profile. They demonstrate advanced osteoarthritis. No evidence of fractures or dislocations. She does have marked joint space collapse, sclerosis and osteophytes visible. Her right hip replacement appears to also be free of any fractures or dislocations. She does have what appears to be neutral version on the acetabular component which may or may not be concerning for why she is having hip flexor tendinitis. I do not have a false profile x-ray dedicated to the right hip so the exam currently is limited.       Procedure:  Orders Placed This Encounter   Procedures    XR HIP LEFT (2-3 VIEWS)     Standing Status:   Future     Number of Occurrences:   1     Standing Expiration Date:   5/10/2023    US ARTHR/ASP/INJ MAJOR JNT/BURSA LEFT     Standing Status:   Future     Number of Occurrences:   1     Standing Expiration Date:   6/10/2022     Order Specific Question:   Reason for exam:     Answer:   PAIN       Left Hip Cortisone Injection - Ultrasound-guided CPT: 59293   Consent was obtained after discussion of the risks, benefits, alternatives, including, but not limited to bleeding, pain, infection, skin disruption or discoloration. Laterality was confirmed (timeout). The hip was prepped with alcohol.  Deep ultrasound was used to visualize the femoral head, neck, and acetabular rim. 22-gauge spinal needle was used. I confirmed transducer orientation along the axis of the femoral neck. [employer] ultrasound unit was used with a variable frequency (6.0-15.0 MHz) linear transducer. Ultrasound-guided needle localization to the hip joint was performed. Confirmation of needle placement was performed, and the image was stored  A formulation of 2cc of Celestone, 1cc of 1% lidocaine, 1cc of 0.25% sensoricaine was injected into the hip. Appropriate insufflation deep to the hip capsule was visualized. The site was cleaned and dressed with a band aid. Images were taken and saved for permanent record. Assessment and Plan  Delmer Harvey was seen today for hip pain. Diagnoses and all orders for this visit:    Pain  -     XR HIP LEFT (2-3 VIEWS); Future    Primary osteoarthritis of left hip  -     US ARTHR/ASP/INJ MAJOR JNT/BURSA LEFT; Future    Other orders  -     bupivacaine (MARCAINE) 0.25 % injection 2.5 mg  -     lidocaine 1 % injection 10 mg  -     betamethasone acetate-betamethasone sodium phosphate (CELESTONE) injection 12 mg        Patient is suffering with advanced left hip osteoarthritis.   She has already had 2 intra-articular cortisone injections and would like a third. We have advised her the risks associated with this including but not limited to infection, increased risk of infection at time of surgery, rapidly progressive degenerative joint disease, and the need to wait 3 months before proceeding with any surgery. She expresses understanding. She will see us back in 3 months or sooner if problems arise. I discussed with Merline Cadena that her history, symptoms, signs and imaging are most consistent with hip arthritis    We reviewed the natural history of these conditions and treatment options ranging from conservative measures (rest, icing, activity modification, physical therapy, pain meds, cortisone injection) to surgical options. In terms of treatment, I recommended continuing with rest, icing, avoidance of painful activities, NSAIDs or pain meds as tolerated, and physical therapy. If these are not effective, cortisone injection can be considered. We discussed surgical options as well, should conservative measures fail. She would be a good candidate for left after hip replacement. We had extended discussion regarding expected outcome. She had a difficult time healing from her right hip and had a significant amount of hip flexor tendinopathy. It is possible that she continues to have the same thing left side. She does have a history of Crohn's disease, ulcerative colitis, and has inflammatory disease including ankylosing spondylitis. Electronically signed by Sara Goins MD on 5/10/2022 at 11:39 AM  This dictation was generated by voice recognition computer software. Although all attempts are made to edit the dictation for accuracy, there may be errors in the transcription that are not intended.

## 2022-05-16 ENCOUNTER — TELEPHONE (OUTPATIENT)
Dept: ORTHOPEDIC SURGERY | Age: 42
End: 2022-05-16

## 2022-05-16 NOTE — TELEPHONE ENCOUNTER
Spoke to patient. She has not had any relief with the intra-articular hip injection done on May 10, 2022. Her hip feels tight with possibly slight increase in pain. She denies any fever chills or redness in the area. The possibility of infection is real but incredibly low on my differential.  I believe her pain is simply coming from the arthritis and I do not believe at this point there are any indications of infection. She is leaving for [place] and would like to be seen before hand. I have made an appointment available for her this Thursday with Dr. Mary Mendoza.  Patient should call sooner if any fever or chills or other signs of infection develop.

## 2022-05-19 ENCOUNTER — OFFICE VISIT (OUTPATIENT)
Dept: ORTHOPEDIC SURGERY | Age: 42
End: 2022-05-19
Payer: COMMERCIAL

## 2022-05-19 VITALS — BODY MASS INDEX: 33.63 KG/M2 | WEIGHT: 197 LBS | HEIGHT: 64 IN

## 2022-05-19 DIAGNOSIS — M16.12 PRIMARY OSTEOARTHRITIS OF LEFT HIP: Primary | ICD-10-CM

## 2022-05-19 DIAGNOSIS — M45.7 ANKYLOSING SPONDYLITIS OF LUMBOSACRAL REGION (HCC): ICD-10-CM

## 2022-05-19 DIAGNOSIS — K51.00 ULCERATIVE PANCOLITIS WITHOUT COMPLICATION (HCC): ICD-10-CM

## 2022-05-19 PROCEDURE — G8427 DOCREV CUR MEDS BY ELIG CLIN: HCPCS | Performed by: ORTHOPAEDIC SURGERY

## 2022-05-19 PROCEDURE — 99215 OFFICE O/P EST HI 40 MIN: CPT | Performed by: ORTHOPAEDIC SURGERY

## 2022-05-19 PROCEDURE — 1036F TOBACCO NON-USER: CPT | Performed by: ORTHOPAEDIC SURGERY

## 2022-05-19 PROCEDURE — G8417 CALC BMI ABV UP PARAM F/U: HCPCS | Performed by: ORTHOPAEDIC SURGERY

## 2022-05-19 NOTE — PROGRESS NOTES
Dr Savannah Morgan      Date /Time 5/19/2022       11:39 AM EDT  Name Dinh Rajput             1980   [facility]  MRN [id number]                Chief Complaint   Patient presents with    Hip Pain     CK LEFT HIP         History of Present Illness    Dinh Rajput is a 39 y.o. female who presents with  left hip pain. Occupation: Physician assistant  Occupational activities: clerical work. Athletic/exercise activity: no sports. Injury Mechanism:  none. Worker's Comp. & legal issues:   none. Previous Treatments: Ice, Heat and NSAIDs    Patient presents the office today for an early follow-up visit. She did call last week and spoke with my physician assistant. She was concerned that her pain was not improving. Her pain was actually increasing. She denied any fever or chills. He did bring urine for an early visit. We will repeat x-rays today to ensure that there is no change. She was placed on oral prednisone by her rheumatologist.  This did not help. Previous history: Patient presents to the office today for new problem. Patient here with a chief complaint of left hip pain. Patient's left hip has been painful for many years. She has had a previous total hip arthroplasty done by Dr. Erna Cotter in 2019 and has residual anterior hip pain patient's left hip is mostly painful over her groin. No injury or trauma. She does have increased pain with activities and improvement with rest.  She cannot take NSAIDs due to Crohn's disease and ulcerative colitis. In addition she does have a history of ankylosing spondylitis. Patient has had 2 intra-articular cortisone injections of the left hip.     Past History  Past Medical History:   Diagnosis Date    Ankylosing spondylitis ([address]     Arthritis     Crohn's     Stotz    Fistula     RECTAL    GERD (gastroesophageal reflux disease)     Iritis 2010    Microcytic anemia     Umbilical hernia      Past Surgical History: Procedure Laterality Date    ABSCESS DRAINAGE  9-2011    RECTAL    COLONOSCOPY      COLONOSCOPY  11-    COLONOSCOPY  12/2011    JOINT REPLACEMENT Right     HIP    OTHER SURGICAL HISTORY  2007    Terminal ileum resection     RECTAL SURGERY  07/31/2012    RECTAL ADVANCEMENT FLAP    SIGMOIDOSCOPY  9/12/12    FLEXIBLE    SIGMOIDOSCOPY  6/7/2017    flexible sigmoidoscopy    SMALL INTESTINE SURGERY      ileocecectomy    UPPER GASTROINTESTINAL ENDOSCOPY       Family History   Problem Relation Age of Onset    Breast Cancer Mother     Cancer Mother     High Blood Pressure Father     High Cholesterol Father     Diabetes Father     Cancer Paternal Aunt         colon    Diabetes Maternal Grandmother     Cancer Maternal Grandmother         colon    Cancer Maternal Grandfather         colon    High Blood Pressure Paternal Grandmother     Cancer Paternal Grandmother         colon    Breast Cancer Maternal Aunt      Social History     Tobacco Use    Smoking status: Never Smoker    Smokeless tobacco: Never Used   Substance Use Topics    Alcohol use: No      Current Outpatient Medications on File Prior to Visit   Medication Sig Dispense Refill    Phentermine-Topiramate (QSYMIA) 7.5-46 MG CP24 Take 1 capsule by mouth daily for 30 days.  30 capsule 0    vitamin D (ERGOCALCIFEROL) 1.25 MG (48422 UT) CAPS capsule Take 1 capsule by mouth once a week 8 capsule 0    pantoprazole (PROTONIX) 40 MG tablet TK 1 T PO D      mercaptopurine (PURINETHOL) 50 MG chemo tablet Take 100 mg by mouth daily      metroNIDAZOLE (FLAGYL) 500 MG tablet Take 500 mg by mouth 3 times daily      vitamin D (ERGOCALCIFEROL) 69768 units CAPS capsule Take 1 capsule by mouth every 14 days Then take vit D3 OTC 4,000 units daily with the largest meal of the day 12 capsule 1    Insulin Syringe-Needle U-100 31G X 5/16\" 1 ML MISC To use with twice a week with methotrexate subcutaneous 60 each 1    certolizumab pegol (CIMZIA PREFILLED) 2 X 200 MG/ML KIT injection Inject 200 mg into the skin every 14 days 1 kit 11    omeprazole (PRILOSEC) 40 MG delayed release capsule Take 40 mg by mouth      Cholecalciferol (VITAMIN D3) 1000 units TABS Take 2,000 Units by mouth daily      EPINEPHrine (EPIPEN) 0.3 MG/0.3ML LOBO injection Inject 0.3 mLs into the muscle once as needed for 1 dose. 1 Device 3     No current facility-administered medications on file prior to visit. ASCVD 10-YEAR RISK SCORE  The 10-year ASCVD risk score (Junie Hill, et al., 2013) is: 0.2%    Values used to calculate the score:      Age: 39 years      Sex: Female      Is Non- : No      Diabetic: No      Tobacco smoker: No      Systolic Blood Pressure: 417 mmHg      Is BP treated: No      HDL Cholesterol: 56 mg/dL      Total Cholesterol: 131 mg/dL   . Review of Systems  10-point ROS is negative other than HPI. Physical Exam  Based off 1997 Exam Criteria  Ht 5' 4\" (1.626 m)   Wt 197 lb (89.4 kg)   LMP 05/07/2022   BMI 33.81 kg/m²      Constitutional:       General: He is not in acute distress. Appearance: Normal appearance. Cardiovascular:      Rate and Rhythm: Normal rate and regular rhythm. Pulses: Normal pulses. Pulmonary:      Effort: Pulmonary effort is normal. No respiratory distress. Neurological:      Mental Status: He is alert and oriented to person, place, and time. Mental status is at baseline.      Musculoskeletal:  Gait:  antalgic    Spine / Hip Exam:      RIGHT  LEFT    Lumbar Spine Exam  [x] All Neg    [x] All Neg     Straight leg raise  []  []Not tested   []  []Not tested    Clonus  []  []Not tested   []  []Not tested    Pain with motion  []  []Not tested   []  []Not tested    Radiculopathy  []  []Not tested   []  []Not tested    Paraspinal muscle tenderness  [] Paraspinal  []Midline   [] Paraspinal  []Midline   Sensation RIGHT  LEFT    L3  [x] Normal []Decreased    [x] Normal []Decreased   L4  [x] Normal []Decreased   [x] Normal []Decreased   L5  [x] Normal []Decreased   [x] Normal []Decreased   S1  [x] Normal  []Decreased   [x] Normal []Decreased   Pelvis       Scoliosis  [x] Nml  [] Present     Leg-length discrepency  [] Equal  [x] Right longer   [] Left longer   Range of Motion Active Passive Active Passive   Hip Flexion 100  100    Abduction 30  30    External Rotation @ 90 flex 45  45    Internal Rotation @ 90 flex 30  0           Hip Impingement / Dysplasia  [x] All Neg  [] Not tested   [] All Neg  [] Not tested    Hip impingement test  []  []Not tested   [x]  []Not tested    C-sign  []  []Not tested   [x]  []Not tested    Anterior instability apprehension  []  []Not tested   []  []Not tested    Posterior instability apprehension  []  []Not tested   []  []Not tested    Uncontained Internal rotation  []  []Not tested  []  []Not tested          Abductors  [x] All Neg  [] Not tested   [x] All Neg  [] Not tested    Medius strength  []  []Not tested   []  []Not tested    Minimum strength  []  []Not tested   []  []Not tested    IT band tendonitis  []  []Not tested   []  []Not tested    Trochanteric tenderness  []  []Not tested  []  []Not tested   Sciatic neuropathic pain  []  []Not tested   []  []Not tested           Post-arthroplasty  [] All Neg  [] Not tested   [] All Neg  [] Not tested    Rectus tendonitis  [x]  []Not tested   []  []Not tested    Iliopsoas tendonitis       Start-up pain  []  []Not tested   []  []Not tested      Markedly positive Stinchfield and impingement signs on left hip. Right hip. Has markedly positive for rectus tendinitis and iliopsoas tendinitis. Well-healed incision anterior hip. Imaging    Left Hip: [facility] AT [facility]  Radiographs: X-rays were ordered and reviewed of the left hip.  3 views. AP pelvis, lateral, and false profile. They demonstrate advanced osteoarthritis. No evidence of fractures or dislocations.   She does have marked joint space collapse, sclerosis and osteophytes visible. No change when compared to films taken earlier in May       Procedure:  Orders Placed This Encounter   Procedures    XR HIP LEFT (2-3 VIEWS)     Standing Status:   Future     Number of Occurrences:   1     Standing Expiration Date:   5/19/2023     Order Specific Question:   Reason for exam:     Answer:   PAIN               Assessment and Plan  Alyssa Irizarry was seen today for hip pain. Diagnoses and all orders for this visit:    Primary osteoarthritis of left hip  -     XR HIP LEFT (2-3 VIEWS); Future    Ankylosing spondylitis of lumbosacral region [facility])    Ulcerative pancolitis without complication [facility])          Patient will proceed with a left anterior total hip arthroplasty. I does have to be 3 months between injection and surgery. Surgery will need to be later than August 10. She does suffer from She does have a history of Crohn's disease, ulcerative colitis, and has inflammatory disease including ankylosing spondylitis. I will need to discuss these chronic conditions with her rheumatologist and determine appropriate preoperative and postoperative care. Considering ankylosing spondylitis, I am wondering if she needs preop radiation. Patient does understand she is at increased risk of infection due to the above mentioned autoimmune disorders and also 3 intra-articular cortisone injections. Also we will need to avoid NSAIDs postoperatively due to ulcerative colitis and Crohn's disease. This will increase her risk of uncontrolled pain postoperatively. Patient's rheumatologist is Dr. Payal Molina [phone]    I discussed with Ziggy Oharaannette that her history, symptoms, signs and imaging are most consistent with hip arthritis    We reviewed the natural history of these conditions and treatment options ranging from conservative measures (rest, icing, activity modification, physical therapy, pain meds, cortisone injection)  to surgical options.      We had a long discussion with the patient about their hip. We discussed surgical and non surgical options for hip arthritis. The most important thing is to work to maintain their range of motion. Next they can try medications including tylenol and NSAIDs. They can try glucosamine or chondroitin. They should also ice frequently and avoid activities that make their hip hurt. Cortisone injections and Synvisc injections are also options when medicine has failed. We finally discussed surgical options including arthroscopic debridement versus hip replacement. Often the arthritis is too far gone for an arthroscopic debridement and pain relief will be short term. Their ultimate solution will be a hip replacement when they are ready for it. They should put it off until they can no longer stand the pain and when nothing else has worked. Conservative measures have failed. She is not interested in cortisone injections. I think she is an appropriate candidate for surgery due to her ongoing symptoms and dysfunction despite conservative measures. The procedure would be left anterior  72469 Total Hip Arthroplasty      Perioperative considerations include: Pre-operative clearance from medical subspecialty. We reviewed the risks, benefits, alternatives of this approach. We discussed risks including, but not limited to, bleeding, pain, infection, scarring, damage to the neurovascular structures, blood clots, pulmonary embolus, stiffness, implant instability or loosening, implant failure, incomplete relief of pain, and incomplete return of function. We also reviewed the surgical details, expected recovery, and rehabilitation (6-9 months). She expressed understanding and will undergo preoperative medical evaluation and optimization. Electronically signed by Lexus Miller MD on 5/19/2022 at 3:19 PM  This dictation was generated by voice recognition computer software.   Although all attempts are made to edit the dictation for accuracy, there may be errors in the transcription that are not intended.

## 2022-05-19 NOTE — LETTER
[facility]  Surgery Scheduling Form:    22     DEMOGRAPHICS    Patient Name:  Karrie Ho  Patient :  1980   Patient SS#:  [id number]    Patient Phone:  [phone] (home) [phone] (work) Alt. Patient Phone:    Patient Address:  [address] [place] [address]    PCP:  Nasima Cherry MD  Insurance:  Payor: [facility] / Plan: [facility] [place] / Product Type: *No Product type* /   Insurance ID Number:  Payor/Plan Subscr  Sex Relation Sub.  Ins. ID Effective Group Num   [facility] 1980 [facility] [id number]                                   [address]       DIAGNOSIS & PROCEDURE    Diagnosis: LEFT  M16.12 Left hip primary osteoarthritis    Operation: LEFT  83099 Total Hip Arthroplasty Minimally-Invasive Direct Anterior     Provider:  Fay Naqvi MD    Location:  [facility] INFORMATION    Requested Date:  2022   Requested Time:  TBD      Patient Arrival Time:  2 hours prior to planned procedure   OR Time Required:  100 Minutes  Admission:  []Outpatient   []23 hour  [x]Same Day Admit:   1-2  days  []Inpatient    Anesthesia:  [x]General  []Spinal  []MAC/Sedation  Regional Anesthesia:  [x]None  []OrthoMix  []Exparel  []Fascia Iliaca / PENG       EQUIPMENT    Position:  [x]Supine  []Lateral  []Beach-chair  []Prone    OR Bed:  []Regular  [x][place]  []Eloy  []Hip castro  []Beach-chair  []Spyder  Radiology:  [x]Large C-arm  []Small C-arm  []Portable X-ray    Implants:  [employer] Hip:  [x]Primary Set  []Revision Set  Medacta Hip:  []Dual-modular acetabulum (DM)    SUTURE: []#5 Ethibond  [x]#2 Ethibond  [x]#2 Quill  []#1 PDS  [x]#1 Vicryl                   [x]2-0 Vicryl  [x]3-0 Monocryl  []2-0 Nylon  []3-0 Nylon  []3-0 PDS                    []Dermabond  []Steri-strips (in half)  DRESSING:  [x]Prineo dermabond  []4x4 gauze  [x]ABDs  [x]Tegaderm  BRACE: []Pelvic Binder  []Hip X-ACT []Knee TROM  []Knee immobilizer                 []Shoulder Immob. (w/abd. pillow)  []Sling  []Ice Unit  []Ace-Wrap      [x][employer]:  Dionicio Pratt [phone], Matt Campbell. [email or web address]  []Medacta: Vlad Kinsey [phone], [email or web address]  []Fx Shoulder: Harish Pierre [phone], Marely Cheung. [email or web address]  [][place]: Torsten Scott [phone], Gerard Julien. [email or web address]    Comments:        Conchis Varghese MD  [address] [facility]  5/19/2022       4:00 PM EDT

## 2022-05-26 ENCOUNTER — TELEMEDICINE (OUTPATIENT)
Dept: BARIATRICS/WEIGHT MGMT | Age: 42
End: 2022-05-26
Payer: COMMERCIAL

## 2022-05-26 DIAGNOSIS — E66.9 CLASS 1 OBESITY: Primary | ICD-10-CM

## 2022-05-26 DIAGNOSIS — Z71.3 DIETARY COUNSELING AND SURVEILLANCE: ICD-10-CM

## 2022-05-26 PROCEDURE — G8427 DOCREV CUR MEDS BY ELIG CLIN: HCPCS | Performed by: FAMILY MEDICINE

## 2022-05-26 PROCEDURE — 99213 OFFICE O/P EST LOW 20 MIN: CPT | Performed by: FAMILY MEDICINE

## 2022-05-26 RX ORDER — PHENTERMINE AND TOPIRAMATE 7.5; 46 MG/1; MG/1
1 CAPSULE, EXTENDED RELEASE ORAL DAILY
Qty: 30 CAPSULE | Refills: 0 | Status: SHIPPED | OUTPATIENT
Start: 2022-05-26 | End: 2022-06-23

## 2022-05-26 ASSESSMENT — ENCOUNTER SYMPTOMS
ABDOMINAL PAIN: 0
EYE PAIN: 0
APNEA: 0
BLOOD IN STOOL: 0
NAUSEA: 0
VOMITING: 0
CHEST TIGHTNESS: 0
WHEEZING: 0
ABDOMINAL DISTENTION: 0
CONSTIPATION: 0
SHORTNESS OF BREATH: 0
COUGH: 0
DIARRHEA: 0
PHOTOPHOBIA: 0
CHOKING: 0

## 2022-05-26 NOTE — PROGRESS NOTES
Patient: Karrie Ho                      Encounter Date: 5/26/2022    YOB: 1980                Age: 39 y.o. Chief Complaint   Patient presents with    Weight Management     F/u MWM         Patient identification was verified at the start of the visit. Patient-Reported Vitals 5/25/2022   Patient-Reported Weight 186   Patient-Reported Height 54   Patient-Reported Systolic 982   Patient-Reported Diastolic 80   Patient-Reported Pulse 84   Patient-Reported Temperature 98.6   Patient-Reported SpO2 99         BP Readings from Last 1 Encounters:   03/21/22 119/81       BMI Readings from Last 1 Encounters:   05/19/22 33.81 kg/m²       Pulse Readings from Last 1 Encounters:   03/21/22 90       Self-reported weight: 186 pounds      HPI: 39 y.o. female with a long-standing history of obesity presents today for virtual video follow-up. she has lost 4 pounds since her last visit. Current treatment includes Qsymia 7.5-46 mg daily. Tolerating it well. No issues. Planning on having a hip replacement in August. Goal is to be in the 170's by then.        Medication(s): Appetite well controlled? [x]? Yes      []? No                          Focus:     [x]? Good     []? Fair     []? Poor                          Side effects? No        Any recent change in medication(s)? No         Allergies   Allergen Reactions    Ciprofloxacin Anaphylaxis    Infliximab Anaphylaxis    Remicade [Infliximab Injection]     Enbrel [Etanercept] Other (See Comments)     Rectal abscess     Humira [Adalimumab] Other (See Comments)     Ineffective         Current Outpatient Medications:     Phentermine-Topiramate (QSYMIA) 7.5-46 MG CP24, Take 1 capsule by mouth daily for 30 days. , Disp: 30 capsule, Rfl: 0    vitamin D (ERGOCALCIFEROL) 1.25 MG (14448 UT) CAPS capsule, Take 1 capsule by mouth once a week, Disp: 8 capsule, Rfl: 0    pantoprazole (PROTONIX) 40 MG tablet, TK 1 T PO D, Disp: , Rfl:     mercaptopurine (PURINETHOL) 50 MG chemo tablet, Take 100 mg by mouth daily, Disp: , Rfl:     metroNIDAZOLE (FLAGYL) 500 MG tablet, Take 500 mg by mouth 3 times daily, Disp: , Rfl:     vitamin D (ERGOCALCIFEROL) 49838 units CAPS capsule, Take 1 capsule by mouth every 14 days Then take vit D3 OTC 4,000 units daily with the largest meal of the day, Disp: 12 capsule, Rfl: 1    Insulin Syringe-Needle U-100 31G X 5/16\" 1 ML MISC, To use with twice a week with methotrexate subcutaneous, Disp: 60 each, Rfl: 1    certolizumab pegol (CIMZIA PREFILLED) 2 X 200 MG/ML KIT injection, Inject 200 mg into the skin every 14 days, Disp: 1 kit, Rfl: 11    omeprazole (PRILOSEC) 40 MG delayed release capsule, Take 40 mg by mouth, Disp: , Rfl:     Cholecalciferol (VITAMIN D3) 1000 units TABS, Take 2,000 Units by mouth daily, Disp: , Rfl:     EPINEPHrine (EPIPEN) 0.3 MG/0.3ML LOBO injection, Inject 0.3 mLs into the muscle once as needed for 1 dose., Disp: 1 Device, Rfl: 3    Patient Active Problem List   Diagnosis    Ankylosing spondylitis (HCC)    Thoracic or lumbosacral neuritis or radiculitis, unspecified    Salmonella infection    Aseptic necrosis of bone (HCC)    Pain in joint, pelvic region and thigh    Crohn's disease (HCC)    Acute gastritis    Loss of weight    Abdominal pain, epigastric    Depressive disorder, not elsewhere classified    Pleurisy    Other and unspecified hyperlipidemia    Other acne    Other specified nutritional anemias    Malaise and fatigue    Other symptoms involving digestive system(787.99)    Abnormal screening cardiac CT    Other general medical examination for administrative purposes    Encounter for long-term (current) use of other medications    Tachycardia    Ulcerative colitis ([facility] [address]    Perirectal fistula    Primary localized osteoarthrosis, pelvic region and thigh    History of total hip replacement, right    Acute pharyngitis    High risk medication use    Crohn's disease of both small and large intestine with rectal bleeding (HCC)    Ankylosing spondylitis of lumbosacral region ([address]    Chronic pain    Encounter for therapeutic drug monitoring    TMJ locking    Chronic GERD       Review of Systems   Constitutional: Negative for fatigue. Eyes: Negative for photophobia, pain and visual disturbance. Respiratory: Negative for apnea, cough, choking, chest tightness, shortness of breath and wheezing. Cardiovascular: Negative for chest pain, palpitations and leg swelling. Gastrointestinal: Negative for abdominal distention, abdominal pain, blood in stool, constipation, diarrhea, nausea and vomiting. Endocrine: Negative for cold intolerance and heat intolerance. Musculoskeletal: Negative for arthralgias and myalgias. Skin: Negative for rash. Neurological: Negative for dizziness, tremors, syncope, weakness, numbness and headaches. Psychiatric/Behavioral: Negative for agitation, confusion, decreased concentration, dysphoric mood, hallucinations, sleep disturbance and suicidal ideas. The patient is not nervous/anxious and is not hyperactive. Physical Exam  Constitutional:       Appearance: She is well-developed. HENT:      Head: Normocephalic. Eyes:      Conjunctiva/sclera: Conjunctivae normal.   Abdominal:      General: Abdomen is protuberant. Musculoskeletal:         General: No swelling. Neurological:      Mental Status: She is alert and oriented to person, place, and time. Psychiatric:         Mood and Affect: Mood normal.         Behavior: Behavior normal.         Thought Content:  Thought content normal.         Judgment: Judgment normal.         Hospital Outpatient Visit on 04/08/2022   Component Date Value Ref Range Status    Ventricular Rate 04/08/2022 89  BPM Final    Atrial Rate 04/08/2022 89  BPM Final    P-R Interval 04/08/2022 150  ms Final    QRS Duration 04/08/2022 84  ms Final    Q-T Interval 04/08/2022 392  ms Final    QTc Calculation (Bazett) 04/08/2022 476  ms Final    P Axis 04/08/2022 38  degrees Final    R Axis 04/08/2022 22  degrees Final    T Axis 04/08/2022 16  degrees Final    Diagnosis 04/08/2022 Normal sinus rhythmPossible Left atrial enlargementProlonged QTConfirmed by Daryl Fair ([id number]) on 4/8/2022 3:40:06 PM   Final         Assessment and Plan:  1. Class 1 obesity  Improving. Continue current management. F/u 4 weeks. - Phentermine-Topiramate (QSYMIA) 7.5-46 MG CP24; Take 1 capsule by mouth daily for 30 days. Dispense: 30 capsule; Refill: 0    2. Dietary counseling and surveillance  Low carb/lobo diet. Nutrition Plan: [] LCHF/Ketogenic   [x] Modified low-calorie diet (low carb/low-lobo)               [] Low-calorie diet    [] Maintenance       []Other        Exercise: [x] Cardio     [] Resistance/strength training                       [x] ACSM recommendations (150 minutes/week in active weight loss)               Behavior: [x] Motivational interviewing performed    [] Referral for counseling                         [x] Discussed strategies to overcome habits/challenges for focus         [] Stress management   [x] Stimulus control         [] Sleep hygiene      Reviewed:  [x] Nutrition and the importance of regular protein intake  [x] Hidden carbohydrate sources  [x] Alcohol use  [x] Tobacco use   [x] Drug use- Denies  [x] Importance of exercise and reducing sedentary time  [x] Treatment consent- Patient understands and agrees with the treatment plan   [x] Proper use of medication and side effects  [x] OARRS report      Controlled Substance Monitoring:    RX Monitoring 6/11/2018   Attestation The Prescription Monitoring Report for this patient was reviewed today. Periodic Controlled Substance Monitoring Possible medication side effects, risk of tolerance/dependence & alternative treatments discussed.           Patient denies any history of cardiovascular disease (e.g., CAD, stroke, arrhythmias, CHF, uncontrolled HTN), Coronavirus Preparedness and Response Supplemental Appropriations Act, this Virtual Visit was conducted with patient's (and/or legal guardian's) consent, to reduce the patient's risk of exposure to COVID-19 and provide necessary medical care. The patient (and/or legal guardian) has also been advised to contact this office for worsening conditions or problems, and seek emergency medical treatment and/or call 911 if deemed necessary.

## 2022-06-01 ENCOUNTER — HOSPITAL ENCOUNTER (OUTPATIENT)
Dept: MRI IMAGING | Age: 42
Discharge: HOME | End: 2022-06-01
Payer: COMMERCIAL

## 2022-06-01 DIAGNOSIS — M25.511 RIGHT SHOULDER PAIN, UNSPECIFIED CHRONICITY: ICD-10-CM

## 2022-06-01 PROCEDURE — 73221 MRI JOINT UPR EXTREM W/O DYE: CPT

## 2022-06-13 DIAGNOSIS — M16.12 PRIMARY OSTEOARTHRITIS OF LEFT HIP: ICD-10-CM

## 2022-06-13 DIAGNOSIS — Z01.818 PRE-OP TESTING: Primary | ICD-10-CM

## 2022-06-15 DIAGNOSIS — M16.12 PRIMARY OSTEOARTHRITIS OF LEFT HIP: Primary | ICD-10-CM

## 2022-06-15 DIAGNOSIS — K51.00 ULCERATIVE PANCOLITIS WITHOUT COMPLICATION (HCC): ICD-10-CM

## 2022-06-15 DIAGNOSIS — M45.7 ANKYLOSING SPONDYLITIS OF LUMBOSACRAL REGION (HCC): ICD-10-CM

## 2022-06-23 ENCOUNTER — TELEMEDICINE (OUTPATIENT)
Dept: BARIATRICS/WEIGHT MGMT | Age: 42
End: 2022-06-23
Payer: COMMERCIAL

## 2022-06-23 DIAGNOSIS — Z71.3 DIETARY COUNSELING AND SURVEILLANCE: ICD-10-CM

## 2022-06-23 DIAGNOSIS — E66.9 CLASS 1 OBESITY: Primary | ICD-10-CM

## 2022-06-23 PROCEDURE — 99214 OFFICE O/P EST MOD 30 MIN: CPT | Performed by: FAMILY MEDICINE

## 2022-06-23 PROCEDURE — G8427 DOCREV CUR MEDS BY ELIG CLIN: HCPCS | Performed by: FAMILY MEDICINE

## 2022-06-23 RX ORDER — PHENTERMINE AND TOPIRAMATE 7.5; 46 MG/1; MG/1
1 CAPSULE, EXTENDED RELEASE ORAL DAILY
Qty: 30 CAPSULE | Refills: 0 | Status: SHIPPED | OUTPATIENT
Start: 2022-06-23 | End: 2022-07-21

## 2022-06-23 ASSESSMENT — ENCOUNTER SYMPTOMS
CHOKING: 0
NAUSEA: 0
WHEEZING: 0
SHORTNESS OF BREATH: 0
EYE PAIN: 0
PHOTOPHOBIA: 0
CONSTIPATION: 0
CHEST TIGHTNESS: 0
COUGH: 0
BLOOD IN STOOL: 0
ABDOMINAL PAIN: 0
ABDOMINAL DISTENTION: 0
DIARRHEA: 0
VOMITING: 0
APNEA: 0

## 2022-06-23 NOTE — PROGRESS NOTES
Patient: Felicia Chavez                      Encounter Date: 6/23/2022    YOB: 1980                Age: 39 y.o. Chief Complaint   Patient presents with    Weight Management     F/u MWM- Qsymia         Patient identification was verified at the start of the visit. Patient-Reported Vitals 6/23/2022   Patient-Reported Weight 181   Patient-Reported Height 54   Patient-Reported Systolic 181   Patient-Reported Diastolic 80   Patient-Reported Pulse 80   Patient-Reported Temperature 98.6   Patient-Reported SpO2 99         BP Readings from Last 1 Encounters:   03/21/22 119/81       BMI Readings from Last 1 Encounters:   05/19/22 33.81 kg/m²       Pulse Readings from Last 1 Encounters:   03/21/22 90       Self-reported weight: 181 pounds      HPI: 41 y.o. female with a long-standing history of obesity presents today for virtual video follow-up. She has lost 5 pounds since her last visit. Current treatment includes Qsymia 7.5-46 mg daily. Tolerating it well. Happy with progress.         Medication(s): Appetite well controlled?     [x]? ? Yes      []? ? No                          Focus:     [x]? ? Good     []? ? Fair     []? ? Poor                          Side effects? No        Any recent change in medication(s)? No    Exercise: []Cardio     [x]Resistance/strength training- once a week with       []Other:     Allergies   Allergen Reactions    Ciprofloxacin Anaphylaxis    Infliximab Anaphylaxis    Remicade [Infliximab Injection]     Enbrel [Etanercept] Other (See Comments)     Rectal abscess     Humira [Adalimumab] Other (See Comments)     Ineffective         Current Outpatient Medications:     Phentermine-Topiramate (QSYMIA) 7.5-46 MG CP24, Take 1 capsule by mouth daily for 30 days. , Disp: 30 capsule, Rfl: 0    mupirocin (BACTROBAN) 2 % ointment, Apply twice daily to each nare for the 5 days prior to surgical procedure, Disp: 1 g, Rfl: 0    vitamin D (ERGOCALCIFEROL) 1.25 MG (74076 UT) CAPS capsule, Take 1 capsule by mouth once a week, Disp: 8 capsule, Rfl: 0    pantoprazole (PROTONIX) 40 MG tablet, TK 1 T PO D, Disp: , Rfl:     mercaptopurine (PURINETHOL) 50 MG chemo tablet, Take 100 mg by mouth daily, Disp: , Rfl:     metroNIDAZOLE (FLAGYL) 500 MG tablet, Take 500 mg by mouth 3 times daily, Disp: , Rfl:     vitamin D (ERGOCALCIFEROL) 68096 units CAPS capsule, Take 1 capsule by mouth every 14 days Then take vit D3 OTC 4,000 units daily with the largest meal of the day, Disp: 12 capsule, Rfl: 1    Insulin Syringe-Needle U-100 31G X 5/16\" 1 ML MISC, To use with twice a week with methotrexate subcutaneous, Disp: 60 each, Rfl: 1    certolizumab pegol (CIMZIA PREFILLED) 2 X 200 MG/ML KIT injection, Inject 200 mg into the skin every 14 days, Disp: 1 kit, Rfl: 11    omeprazole (PRILOSEC) 40 MG delayed release capsule, Take 40 mg by mouth, Disp: , Rfl:     Cholecalciferol (VITAMIN D3) 1000 units TABS, Take 2,000 Units by mouth daily, Disp: , Rfl:     EPINEPHrine (EPIPEN) 0.3 MG/0.3ML LOBO injection, Inject 0.3 mLs into the muscle once as needed for 1 dose., Disp: 1 Device, Rfl: 3    Patient Active Problem List   Diagnosis    Ankylosing spondylitis (HCC)    Thoracic or lumbosacral neuritis or radiculitis, unspecified    Salmonella infection    Aseptic necrosis of bone (HCC)    Pain in joint, pelvic region and thigh    Crohn's disease ([address]    Acute gastritis    Loss of weight    Abdominal pain, epigastric    Depressive disorder, not elsewhere classified    Pleurisy    Other and unspecified hyperlipidemia    Other acne    Other specified nutritional anemias    Malaise and fatigue    Other symptoms involving digestive system(787.99)    Abnormal screening cardiac CT    Other general medical examination for administrative purposes    Encounter for long-term (current) use of other medications    Tachycardia    Ulcerative colitis ([address]    Perirectal fistula    Primary localized osteoarthrosis, pelvic region and thigh    History of total hip replacement, right    Acute pharyngitis    High risk medication use    Crohn's disease of both small and large intestine with rectal bleeding (HCC)    Ankylosing spondylitis of lumbosacral region ([address]    Chronic pain    Encounter for therapeutic drug monitoring    TMJ locking    Chronic GERD       Review of Systems   Constitutional: Negative for fatigue. Eyes: Negative for photophobia, pain and visual disturbance. Respiratory: Negative for apnea, cough, choking, chest tightness, shortness of breath and wheezing. Cardiovascular: Negative for chest pain, palpitations and leg swelling. Gastrointestinal: Negative for abdominal distention, abdominal pain, blood in stool, constipation, diarrhea, nausea and vomiting. Endocrine: Negative for cold intolerance and heat intolerance. Musculoskeletal: Negative for arthralgias and myalgias. Skin: Negative for rash. Neurological: Negative for dizziness, tremors, syncope, weakness, numbness and headaches. Psychiatric/Behavioral: Negative for agitation, confusion, decreased concentration, dysphoric mood, hallucinations, sleep disturbance and suicidal ideas. The patient is not nervous/anxious and is not hyperactive. Physical Exam  Constitutional:       Appearance: She is well-developed. HENT:      Head: Normocephalic. Eyes:      Conjunctiva/sclera: Conjunctivae normal.   Abdominal:      General: Abdomen is protuberant. Musculoskeletal:         General: No swelling. Neurological:      Mental Status: She is alert and oriented to person, place, and time. Psychiatric:         Mood and Affect: Mood normal.         Behavior: Behavior normal.         Thought Content:  Thought content normal.         Judgment: Judgment normal.         Hospital Outpatient Visit on 04/08/2022   Component Date Value Ref Range Status    Ventricular Rate 04/08/2022 89  BPM Final    Atrial Rate 04/08/2022 89  BPM Final    P-R Interval 04/08/2022 150  ms Final    QRS Duration 04/08/2022 84  ms Final    Q-T Interval 04/08/2022 392  ms Final    QTc Calculation (Bazett) 04/08/2022 476  ms Final    P Axis 04/08/2022 38  degrees Final    R Axis 04/08/2022 22  degrees Final    T Axis 04/08/2022 16  degrees Final    Diagnosis 04/08/2022 Normal sinus rhythmPossible Left atrial enlargementProlonged QTConfirmed by Brittni Barajas ([id number]) on 4/8/2022 3:40:06 PM   Final         Assessment and Plan:  1. Class 1 obesity  Improving, but not at goal.  Continue current management- Qsymia, low carb/vinny diet and exercise. F/u 4 weeks  In-office weight before next visit. - Phentermine-Topiramate (QSYMIA) 7.5-46 MG CP24; Take 1 capsule by mouth daily for 30 days. Dispense: 30 capsule; Refill: 0    2. Dietary counseling and surveillance  1200-Vinny/low carb meal plan.           Nutrition Plan: [] LCHF/Ketogenic   [x] Modified low-calorie diet (low carb/low-vinny)               [] Low-calorie diet    [] Maintenance       []Other        Exercise: [x] Cardio     [x] Resistance/strength training                       [x] ACSM recommendations (150 minutes/week in active weight loss)               Behavior: [x] Motivational interviewing performed    [] Referral for counseling                         [x] Discussed strategies to overcome habits/challenges for focus         [] Stress management   [x] Stimulus control         [] Sleep hygiene      Reviewed:  [x] Nutrition and the importance of regular protein intake  [x] Hidden carbohydrate sources  [x] Alcohol use  [x] Tobacco use   [x] Drug use- Denies  [x] Importance of exercise and reducing sedentary time  [x] Treatment consent- Patient understands and agrees with the treatment plan   [x] Proper use of medication and side effects  [x] OARRS report      Controlled Substance Monitoring:    RX Monitoring 6/11/2018   Attestation The Prescription Monitoring Report for this patient was reviewed today. Periodic Controlled Substance Monitoring Possible medication side effects, risk of tolerance/dependence & alternative treatments discussed. Patient denies any history of cardiovascular disease (e.g., CAD, stroke, arrhythmias, CHF, uncontrolled HTN), seizure disorder, MAOI use within the last 2 weeks, hyperthyroidism, glaucoma, agitated states, history of drug abuse, pregnancy, nursing, known hypersensitivity to the prescribing meds, history of pancreatitis, or personal or family history of thyroid medullary cancer. Treatment start date: 4/18/22  12 weeks: 7/18/22  Starting weight: 194 pounds  Goal: At least 5% (9.5 pounds)  Total weight loss: 13 pounds     Key dietary points:    - Meats (preferably organic or grass fed) are great sources of protein and have no carbohydrates. - Recommend coconut, olive, avocado, or almond oils. - When buying dairy, choose regular or full fat options. - Choose vegetables that grow above ground as they are generally lower in carbohydrates and higher in fiber.  - Avoid starches such as bread, rice, potatoes, pasta and all sources of simple sugars (desserts, soda, breakfast cereals). - Choose beverages that are calorie and sugar free. Reminder regarding weight loss medications: You must be seen in office every 2-4 weeks to haveyour prescriptions refilled. If you are off of your medication for longer than 7 days, you will not be able to restart the medication for at least 6 months. Always call our office to report any side effects. Females, it is your responsibility to obtain negative pregnancy tests each month. No orders of the defined types were placed in this encounter. No follow-ups on file. Юлия Souza is a 39 y.o. female being evaluated by a Virtual Visit (video visit) encounter to address concerns as mentioned above. A caregiver was present when appropriate.  Due to this being a TeleHealth encounter (During COVID-19 public health emergency), evaluation of the following organ systems was limited: Vitals/Constitutional/EENT/Resp/CV/GI//MS/Neuro/Skin/Heme-Lymph-Imm. Pursuant to the emergency declaration under the [address] [place] [address], [address] [place] [address] authority and the [employer] Act, this Virtual Visit was conducted with patient's (and/or legal guardian's) consent, to reduce the patient's risk of exposure to COVID-19 and provide necessary medical care. The patient (and/or legal guardian) has also been advised to contact this office for worsening conditions or problems, and seek emergency medical treatment and/or call 911 if deemed necessary.

## 2022-07-21 ENCOUNTER — TELEPHONE (OUTPATIENT)
Dept: ORTHOPEDIC SURGERY | Age: 42
End: 2022-07-21

## 2022-07-21 ENCOUNTER — TELEMEDICINE (OUTPATIENT)
Dept: BARIATRICS/WEIGHT MGMT | Age: 42
End: 2022-07-21
Payer: COMMERCIAL

## 2022-07-21 VITALS — WEIGHT: 179.8 LBS | BODY MASS INDEX: 30.7 KG/M2 | HEIGHT: 64 IN

## 2022-07-21 DIAGNOSIS — Z71.3 DIETARY COUNSELING AND SURVEILLANCE: ICD-10-CM

## 2022-07-21 DIAGNOSIS — E66.9 CLASS 1 OBESITY: Primary | ICD-10-CM

## 2022-07-21 PROCEDURE — G8427 DOCREV CUR MEDS BY ELIG CLIN: HCPCS | Performed by: FAMILY MEDICINE

## 2022-07-21 PROCEDURE — 99214 OFFICE O/P EST MOD 30 MIN: CPT | Performed by: FAMILY MEDICINE

## 2022-07-21 RX ORDER — PHENTERMINE AND TOPIRAMATE 7.5; 46 MG/1; MG/1
1 CAPSULE, EXTENDED RELEASE ORAL DAILY
Qty: 30 CAPSULE | Refills: 0 | Status: SHIPPED | OUTPATIENT
Start: 2022-07-21 | End: 2022-08-20

## 2022-07-21 ASSESSMENT — ENCOUNTER SYMPTOMS
CHEST TIGHTNESS: 0
CONSTIPATION: 0
WHEEZING: 0
ABDOMINAL PAIN: 0
DIARRHEA: 0
APNEA: 0
ABDOMINAL DISTENTION: 0
EYE PAIN: 0
BLOOD IN STOOL: 0
VOMITING: 0
PHOTOPHOBIA: 0
SHORTNESS OF BREATH: 0
CHOKING: 0
NAUSEA: 0
COUGH: 0

## 2022-07-21 NOTE — PROGRESS NOTES
Patient: Keyana Stinson                      Encounter Date: 7/21/2022    YOB: 1980                Age: 39 y.o. Chief Complaint   Patient presents with    Weight Management     F/u MWM           Patient identification was verified at the start of the visit. Patient-Reported Vitals 7/21/2022   Patient-Reported Weight 178   Patient-Reported Height 54   Patient-Reported Systolic 233   Patient-Reported Diastolic 78   Patient-Reported Pulse 80   Patient-Reported Temperature 98.6   Patient-Reported SpO2 99         BP Readings from Last 1 Encounters:   03/21/22 119/81       BMI Readings from Last 1 Encounters:   07/21/22 30.86 kg/m²       Pulse Readings from Last 1 Encounters:   03/21/22 90     Wt Readings from Last 3 Encounters:   07/21/22 179 lb 12.8 oz (81.6 kg)   05/19/22 197 lb (89.4 kg)   05/10/22 197 lb (89.4 kg)        Weight: 179 pounds     HPI: 39 y.o. female with a long-standing history of obesity presents today for virtual video follow-up. She has lost 2 pounds since her last visit. Current treatment includes Qsymia 7.5-46 mg daily. No issues. Hip replacement scheduled for 9/7        Medication(s): Appetite well controlled? [x]Yes      []No                          Focus:     [x]Good     []Fair     []Poor                          Side effects? No        Any recent change in medication(s)? No     Exercise: []Cardio     [x]Resistance/strength training- once a week with       []Other:     Allergies   Allergen Reactions    Ciprofloxacin Anaphylaxis    Infliximab Anaphylaxis    Remicade [Infliximab Injection]     Enbrel [Etanercept] Other (See Comments)     Rectal abscess     Humira [Adalimumab] Other (See Comments)     Ineffective         Current Outpatient Medications:     Phentermine-Topiramate (QSYMIA) 7.5-46 MG CP24, Take 1 capsule by mouth daily for 30 days. , Disp: 30 capsule, Rfl: 0    mupirocin (BACTROBAN) 2 % ointment, Apply twice daily to each nare for the 5 days prior to surgical procedure, Disp: 1 g, Rfl: 0    vitamin D (ERGOCALCIFEROL) 1.25 MG (04865 UT) CAPS capsule, Take 1 capsule by mouth once a week, Disp: 8 capsule, Rfl: 0    pantoprazole (PROTONIX) 40 MG tablet, TK 1 T PO D, Disp: , Rfl:     mercaptopurine (PURINETHOL) 50 MG chemo tablet, Take 100 mg by mouth daily, Disp: , Rfl:     metroNIDAZOLE (FLAGYL) 500 MG tablet, Take 500 mg by mouth 3 times daily, Disp: , Rfl:     vitamin D (ERGOCALCIFEROL) 81502 units CAPS capsule, Take 1 capsule by mouth every 14 days Then take vit D3 OTC 4,000 units daily with the largest meal of the day, Disp: 12 capsule, Rfl: 1    Insulin Syringe-Needle U-100 31G X 5/16\" 1 ML MISC, To use with twice a week with methotrexate subcutaneous, Disp: 60 each, Rfl: 1    certolizumab pegol (CIMZIA PREFILLED) 2 X 200 MG/ML KIT injection, Inject 200 mg into the skin every 14 days, Disp: 1 kit, Rfl: 11    omeprazole (PRILOSEC) 40 MG delayed release capsule, Take 40 mg by mouth, Disp: , Rfl:     Cholecalciferol (VITAMIN D3) 1000 units TABS, Take 2,000 Units by mouth daily, Disp: , Rfl:     EPINEPHrine (EPIPEN) 0.3 MG/0.3ML LOBO injection, Inject 0.3 mLs into the muscle once as needed for 1 dose., Disp: 1 Device, Rfl: 3    Patient Active Problem List   Diagnosis    Ankylosing spondylitis (HCC)    Thoracic or lumbosacral neuritis or radiculitis, unspecified    Salmonella infection    Aseptic necrosis of bone (HCC)    Pain in joint, pelvic region and thigh    Crohn's disease (HCC)    Acute gastritis    Loss of weight    Abdominal pain, epigastric    Depressive disorder, not elsewhere classified    Pleurisy    Other and unspecified hyperlipidemia    Other acne    Other specified nutritional anemias    Malaise and fatigue    Other symptoms involving digestive system(787.99)    Abnormal screening cardiac CT    Other general medical examination for administrative purposes    Encounter for long-term (current) use of other medications    Tachycardia Ulcerative colitis ([facility] [address]    Perirectal fistula    Primary localized osteoarthrosis, pelvic region and thigh    History of total hip replacement, right    Acute pharyngitis    High risk medication use    Crohn's disease of both small and large intestine with rectal bleeding (HCC)    Ankylosing spondylitis of lumbosacral region [facility])    Chronic pain    Encounter for therapeutic drug monitoring    TMJ locking    Chronic GERD       Review of Systems   Constitutional:  Negative for fatigue. Eyes:  Negative for photophobia, pain and visual disturbance. Respiratory:  Negative for apnea, cough, choking, chest tightness, shortness of breath and wheezing. Cardiovascular:  Negative for chest pain, palpitations and leg swelling. Gastrointestinal:  Negative for abdominal distention, abdominal pain, blood in stool, constipation, diarrhea, nausea and vomiting. Endocrine: Negative for cold intolerance and heat intolerance. Musculoskeletal:  Negative for arthralgias and myalgias. Skin:  Negative for rash. Neurological:  Negative for dizziness, tremors, syncope, weakness, numbness and headaches. Psychiatric/Behavioral:  Negative for agitation, confusion, decreased concentration, dysphoric mood, hallucinations, sleep disturbance and suicidal ideas. The patient is not nervous/anxious and is not hyperactive. Physical Exam  Constitutional:       Appearance: She is well-developed. HENT:      Head: Normocephalic. Eyes:      Conjunctiva/sclera: Conjunctivae normal.   Abdominal:      General: Abdomen is protuberant. Musculoskeletal:         General: No swelling. Neurological:      Mental Status: She is alert and oriented to person, place, and time. Psychiatric:         Mood and Affect: Mood normal.         Behavior: Behavior normal.         Thought Content:  Thought content normal.         Judgment: Judgment normal.       Hospital Outpatient Visit on 04/08/2022   Component Date Value Ref Range Status Ventricular Rate 04/08/2022 89  BPM Final    Atrial Rate 04/08/2022 89  BPM Final    P-R Interval 04/08/2022 150  ms Final    QRS Duration 04/08/2022 84  ms Final    Q-T Interval 04/08/2022 392  ms Final    QTc Calculation (Bazett) 04/08/2022 476  ms Final    P Axis 04/08/2022 38  degrees Final    R Axis 04/08/2022 22  degrees Final    T Axis 04/08/2022 16  degrees Final    Diagnosis 04/08/2022 Normal sinus rhythmPossible Left atrial enlargementProlonged QTConfirmed by Nyla Alvarez ([id number]) on 4/8/2022 3:40:06 PM   Final         Assessment and Plan:  1. Class 1 obesity  Improving, but not at goal.  Continue Qsymia, low carb/vinny diet, and exercise. New 3 month weight loss goal set today. F/u 4 weeks. - Phentermine-Topiramate (QSYMIA) 7.5-46 MG CP24; Take 1 capsule by mouth daily for 30 days. Dispense: 30 capsule; Refill: 0    2. Dietary counseling and surveillance  1200-Vinny/low carb meal plan.        Nutrition Plan: [] LCHF/Ketogenic   [x] Modified low-calorie diet (low carb/low-vinny)               [] Low-calorie diet    [] Maintenance       []Other        Exercise: [x] Cardio     [x] Resistance/strength training                       [x] ACSM recommendations (150 minutes/week in active weight loss)               Behavior: [x] Motivational interviewing performed    [] Referral for counseling                         [x] Discussed strategies to overcome habits/challenges for focus         [] Stress management   [x] Stimulus control         [] Sleep hygiene      Reviewed:  [x] Nutrition and the importance of regular protein intake  [x] Hidden carbohydrate sources  [x] Alcohol use  [x] Tobacco use   [x] Drug use- Denies  [x] Importance of exercise and reducing sedentary time  [x] Treatment consent- Patient understands and agrees with the treatment plan   [x] Proper use of medication and side effects  [x] OARRS report      Controlled Substance Monitoring:    RX Monitoring 6/11/2018   Attestation The Prescription (video visit) encounter to address concerns as mentioned above. A caregiver was present when appropriate. Due to this being a TeleHealth encounter (During HNLKT-99 public health emergency), evaluation of the following organ systems was limited: Vitals/Constitutional/EENT/Resp/CV/GI//MS/Neuro/Skin/Heme-Lymph-Imm. Pursuant to the emergency declaration under the [address] [place] [address] and the [employer] and [employer] Act, this Virtual Visit was conducted with patient's (and/or legal guardian's) consent, to reduce the patient's risk of exposure to COVID-19 and provide necessary medical care. The patient (and/or legal guardian) has also been advised to contact this office for worsening conditions or problems, and seek emergency medical treatment and/or call 911 if deemed necessary.

## 2022-08-05 ENCOUNTER — TELEPHONE (OUTPATIENT)
Dept: ORTHOPEDIC SURGERY | Age: 42
End: 2022-08-05

## 2022-08-08 ENCOUNTER — OFFICE VISIT (OUTPATIENT)
Dept: DERMATOLOGY | Age: 42
End: 2022-08-08
Payer: COMMERCIAL

## 2022-08-08 DIAGNOSIS — D22.9 MULTIPLE NEVI: ICD-10-CM

## 2022-08-08 DIAGNOSIS — L82.1 SK (SEBORRHEIC KERATOSIS): ICD-10-CM

## 2022-08-08 DIAGNOSIS — L73.8 SEBACEOUS GLAND HYPERPLASIA OF FACE: Primary | ICD-10-CM

## 2022-08-08 PROCEDURE — G8417 CALC BMI ABV UP PARAM F/U: HCPCS | Performed by: DERMATOLOGY

## 2022-08-08 PROCEDURE — 99213 OFFICE O/P EST LOW 20 MIN: CPT | Performed by: DERMATOLOGY

## 2022-08-08 PROCEDURE — 1036F TOBACCO NON-USER: CPT | Performed by: DERMATOLOGY

## 2022-08-08 PROCEDURE — G8427 DOCREV CUR MEDS BY ELIG CLIN: HCPCS | Performed by: DERMATOLOGY

## 2022-08-08 NOTE — PROGRESS NOTES
[facility]  Zo Deras MD  [phone]      Pham Joy  1980    39 y.o. female     Date of Visit: 8/8/2022    Chief Complaint: skin lesions    History of Present Illness:    She complains of few lesions on the central face. 2.   She reports an asymptomatic growth on the back. 3.  She has multiple nevi - not aware of any changes in size, color or shape. Has ankylosing spondylitis and Crohn's disease. Remains on Cimzia. No family history of skin cancer. Review of Systems:  Gen: Feels well, good sense of health. Past Medical History, Family History, Surgical History, Medications and Allergies reviewed. Past Medical History:   Diagnosis Date    Ankylosing spondylitis ([address]     Arthritis     Crohn's     Stotz    Fistula     RECTAL    GERD (gastroesophageal reflux disease)     Iritis 2010    Microcytic anemia     Umbilical hernia      Past Surgical History:   Procedure Laterality Date    ABSCESS DRAINAGE  9-2011    RECTAL    COLONOSCOPY      COLONOSCOPY  11-    COLONOSCOPY  12/2011    JOINT REPLACEMENT Right     HIP    OTHER SURGICAL HISTORY  2007    Terminal ileum resection     RECTAL SURGERY  07/31/2012    RECTAL ADVANCEMENT FLAP    SIGMOIDOSCOPY  9/12/12    FLEXIBLE    SIGMOIDOSCOPY  6/7/2017    flexible sigmoidoscopy    SMALL INTESTINE SURGERY      ileocecectomy    UPPER GASTROINTESTINAL ENDOSCOPY         Allergies   Allergen Reactions    Ciprofloxacin Anaphylaxis    Infliximab Anaphylaxis    Remicade [Infliximab Injection]     Enbrel [Etanercept] Other (See Comments)     Rectal abscess     Humira [Adalimumab] Other (See Comments)     Ineffective     Outpatient Medications Marked as Taking for the 8/8/22 encounter (Office Visit) with Anastasiya Alberto MD   Medication Sig Dispense Refill    Phentermine-Topiramate (QSYMIA) 7.5-46 MG CP24 Take 1 capsule by mouth daily for 30 days.  30 capsule 0    mupirocin (BACTROBAN) 2 % ointment Apply twice daily to each nare for the 5 days prior to surgical procedure 1 g 0    certolizumab pegol (CIMZIA PREFILLED) 2 X 200 MG/ML KIT injection Inject 200 mg into the skin every 14 days 1 kit 11    EPINEPHrine (EPIPEN) 0.3 MG/0.3ML LOBO injection Inject 0.3 mLs into the muscle once as needed for 1 dose. 1 Device 3         Physical Examination       The following were examined and determined to be normal: Psych/Neuro, Scalp/hair, Head/face, Conjunctivae/eyelids, Gums/teeth/lips, Neck, Breast/axilla/chest, Abdomen, Back, RUE, LUE, RLE, LLE, and Nails/digits. The following were examined and determined to be abnormal: none. Well appearing. Medial cheeks, right lateral brow with umbilicated yellowish pink papules. 2.   Left lateral back - stuck on appearing verrucous tan plaque. 3.  Trunk and extremities with multiple well defined round to oval smooth brown macules and papules. Assessment and Plan     1. Sebaceous gland hyperplasia of face     Reassurance. Consider fine needle electrodesiccation in the future. 2. SK (seborrheic keratosis)     Reassurance. 3. Multiple nevi - benign appearing    Sun protective behaviors, including use of at least SPF 30 sunscreen, and self skin examinations were encouraged. Call for any new or concerning lesions. Return in about 1 year (around 8/8/2023).     --Barry Silverman MD

## 2022-08-17 ENCOUNTER — TELEPHONE (OUTPATIENT)
Dept: ORTHOPEDIC SURGERY | Age: 42
End: 2022-08-17

## 2022-08-17 NOTE — TELEPHONE ENCOUNTER
SUBMITTED INPATIENT SX PA L HIP VIA [facility] WEBSITE. PENDING STATUS [id number].

## 2022-08-19 ENCOUNTER — OFFICE VISIT (OUTPATIENT)
Dept: ORTHOPEDIC SURGERY | Age: 42
End: 2022-08-19
Payer: COMMERCIAL

## 2022-08-19 VITALS — WEIGHT: 179 LBS | HEIGHT: 64 IN | BODY MASS INDEX: 30.56 KG/M2

## 2022-08-19 DIAGNOSIS — M16.12 PRIMARY OSTEOARTHRITIS OF LEFT HIP: Primary | ICD-10-CM

## 2022-08-19 DIAGNOSIS — M45.7 ANKYLOSING SPONDYLITIS OF LUMBOSACRAL REGION (HCC): ICD-10-CM

## 2022-08-19 DIAGNOSIS — K51.00 ULCERATIVE PANCOLITIS WITHOUT COMPLICATION (HCC): ICD-10-CM

## 2022-08-19 PROCEDURE — G8417 CALC BMI ABV UP PARAM F/U: HCPCS | Performed by: ORTHOPAEDIC SURGERY

## 2022-08-19 PROCEDURE — 99215 OFFICE O/P EST HI 40 MIN: CPT | Performed by: ORTHOPAEDIC SURGERY

## 2022-08-19 PROCEDURE — G8427 DOCREV CUR MEDS BY ELIG CLIN: HCPCS | Performed by: ORTHOPAEDIC SURGERY

## 2022-08-19 PROCEDURE — 1036F TOBACCO NON-USER: CPT | Performed by: ORTHOPAEDIC SURGERY

## 2022-08-19 NOTE — TELEPHONE ENCOUNTER
Kev Alba [id number]    Date: 09/07/22  Type of SX:  INPATIENT  Location: [address] [place] [address]  CPT: 46460   DX Code: M16.12  SX area: L HIP  Insurance: [facility]

## 2022-08-19 NOTE — PROGRESS NOTES
Dr Rani Serrano      Date /Time 8/19/2022       11:39 AM EDT  Name Hunter Shelby             1980   Location  [facility]  MRN [id number]                Chief Complaint   Patient presents with    Hip Pain     SCHEDULED FOR LEFT YUSEF 9/7/22        History of Present Illness    Hunter Shelby is a 39 y.o. female who presents with  left hip pain. Occupation:  Physician assistant  Occupational activities: clerical work. Athletic/exercise activity: no sports. Injury Mechanism:  none. Worker's Comp. & legal issues:   none. Previous Treatments: Ice, Heat and NSAIDs    Patient is here to further discuss a left total hip arthroplasty. She is currently scheduled for surgery September 7, 2022. She has seen oncology and is on track to have her preoperative radiation to prevent heterotopic ossification. Patient cannot use NSAIDs due to ulcerative colitis and Crohn's disease. I have also had a conversation with her rheumatologist.  They would prefer the patient not to be off of her immune modulating medications for Crohn's and ulcerative colitis for longer than 3 weeks. We will plan on doing 10 days before and 10 days after. She continues to complain of pain over the anterior aspect of her hip. No new injury or trauma. Increased pain with activities and improvement with rest    Previous history: Patient presents the office today for an early follow-up visit. She did call last week and spoke with my physician assistant. She was concerned that her pain was not improving. Her pain was actually increasing. She denied any fever or chills. He did bring urine for an early visit. We will repeat x-rays today to ensure that there is no change. She was placed on oral prednisone by her rheumatologist.  This did not help. Previous history: Patient presents to the office today for new problem. Patient here with a chief complaint of left hip pain.   Patient's left hip has been painful for many years.  She has had a previous total hip arthroplasty done by Dr. Froilan Malone in 2019 and has residual anterior hip pain patient's left hip is mostly painful over her groin. No injury or trauma. She does have increased pain with activities and improvement with rest.  She cannot take NSAIDs due to Crohn's disease and ulcerative colitis. In addition she does have a history of ankylosing spondylitis. Patient has had 2 intra-articular cortisone injections of the left hip.     Past History  Past Medical History:   Diagnosis Date    Ankylosing spondylitis ([address]     Arthritis     Crohn's     Stotz    Fistula     RECTAL    GERD (gastroesophageal reflux disease)     Iritis 2010    Microcytic anemia     Umbilical hernia      Past Surgical History:   Procedure Laterality Date    ABSCESS DRAINAGE  9-2011    RECTAL    COLONOSCOPY      COLONOSCOPY  11-    COLONOSCOPY  12/2011    JOINT REPLACEMENT Right     HIP    OTHER SURGICAL HISTORY  2007    Terminal ileum resection     RECTAL SURGERY  07/31/2012    RECTAL ADVANCEMENT FLAP    SIGMOIDOSCOPY  9/12/12    FLEXIBLE    SIGMOIDOSCOPY  6/7/2017    flexible sigmoidoscopy    SMALL INTESTINE SURGERY      ileocecectomy    UPPER GASTROINTESTINAL ENDOSCOPY       Family History   Problem Relation Age of Onset    Breast Cancer Mother     Cancer Mother     High Blood Pressure Father     High Cholesterol Father     Diabetes Father     Cancer Paternal Aunt         colon    Diabetes Maternal Grandmother     Cancer Maternal Grandmother         colon    Cancer Maternal Grandfather         colon    High Blood Pressure Paternal Grandmother     Cancer Paternal Grandmother         colon    Breast Cancer Maternal Aunt      Social History     Tobacco Use    Smoking status: Never    Smokeless tobacco: Never   Substance Use Topics    Alcohol use: No      Current Outpatient Medications on File Prior to Visit   Medication Sig Dispense Refill    Phentermine-Topiramate (QSYMIA) 7.5-46 MG CP24 Take 1 capsule by mouth daily for 30 days. 30 capsule 0    mupirocin (BACTROBAN) 2 % ointment Apply twice daily to each nare for the 5 days prior to surgical procedure 1 g 0    certolizumab pegol (CIMZIA PREFILLED) 2 X 200 MG/ML KIT injection Inject 200 mg into the skin every 14 days 1 kit 11    EPINEPHrine (EPIPEN) 0.3 MG/0.3ML LOBO injection Inject 0.3 mLs into the muscle once as needed for 1 dose. 1 Device 3     No current facility-administered medications on file prior to visit. ASCVD 10-YEAR RISK SCORE  The 10-year ASCVD risk score (Cynthia Edward., et al., 2013) is: 0.2%    Values used to calculate the score:      Age: 39 years      Sex: Female      Is Non- : No      Diabetic: No      Tobacco smoker: No      Systolic Blood Pressure: 134 mmHg      Is BP treated: No      HDL Cholesterol: 56 mg/dL      Total Cholesterol: 131 mg/dL   . Review of Systems  10-point ROS is negative other than HPI. Physical Exam  Based off 1997 Exam Criteria  Ht 5' 4\" (1.626 m)   Wt 179 lb (81.2 kg)   BMI 30.73 kg/m²      Constitutional:       General: He is not in acute distress. Appearance: Normal appearance. Cardiovascular:      Rate and Rhythm: Normal rate and regular rhythm. Pulses: Normal pulses. Pulmonary:      Effort: Pulmonary effort is normal. No respiratory distress. Neurological:      Mental Status: He is alert and oriented to person, place, and time. Mental status is at baseline.      Musculoskeletal:  Gait:  antalgic    Spine / Hip Exam:      RIGHT  LEFT    Lumbar Spine Exam  [x] All Neg    [x] All Neg     Straight leg raise  []  []Not tested   []  []Not tested    Clonus  []  []Not tested   []  []Not tested    Pain with motion  []  []Not tested   []  []Not tested    Radiculopathy  []  []Not tested   []  []Not tested    Paraspinal muscle tenderness  [] Paraspinal  []Midline   [] Paraspinal  []Midline   Sensation RIGHT  LEFT    L3  [x] Normal []Decreased    [x] Normal []Decreased L4  [x] Normal  []Decreased   [x] Normal []Decreased   L5  [x] Normal []Decreased   [x] Normal []Decreased   S1  [x] Normal  []Decreased   [x] Normal []Decreased   Pelvis       Scoliosis  [x] Nml  [] Present     Leg-length discrepency  [] Equal  [x] Right longer   [] Left longer   Range of Motion Active Passive Active Passive   Hip Flexion 100  100    Abduction 30  30    External Rotation @ 90 flex 45  45    Internal Rotation @ 90 flex 30  0           Hip Impingement / Dysplasia  [x] All Neg  [] Not tested   [] All Neg  [] Not tested    Hip impingement test  []  []Not tested   [x]  []Not tested    C-sign  []  []Not tested   [x]  []Not tested    Anterior instability apprehension  []  []Not tested   []  []Not tested    Posterior instability apprehension  []  []Not tested   []  []Not tested    Uncontained Internal rotation  []  []Not tested  []  []Not tested          Abductors  [x] All Neg  [] Not tested   [x] All Neg  [] Not tested    Medius strength  []  []Not tested   []  []Not tested    Minimum strength  []  []Not tested   []  []Not tested    IT band tendonitis  []  []Not tested   []  []Not tested    Trochanteric tenderness  []  []Not tested  []  []Not tested   Sciatic neuropathic pain  []  []Not tested   []  []Not tested           Post-arthroplasty  [] All Neg  [] Not tested   [] All Neg  [] Not tested    Rectus tendonitis  [x]  []Not tested   []  []Not tested    Iliopsoas tendonitis       Start-up pain  []  []Not tested   []  []Not tested      Markedly positive Stinchfield and impingement signs on left hip. Right hip. Has markedly positive for rectus tendinitis and iliopsoas tendinitis. Well-healed incision anterior hip. Imaging    Left Hip: [address] [facility]  Radiographs: X-rays were ordered and reviewed of the left hip.  3 views. AP pelvis, lateral, and false profile. They demonstrate advanced osteoarthritis. No evidence of fractures or dislocations.   She does have marked joint space collapse, sclerosis and osteophytes visible. No change when compared to films taken earlier in May       Procedure:  No orders of the defined types were placed in this encounter. Assessment and Plan  Shaquille Ha was seen today for hip pain. Diagnoses and all orders for this visit:    Primary osteoarthritis of left hip    Ankylosing spondylitis of lumbosacral region [facility])    Ulcerative pancolitis without complication ([address]          Patient will proceed with a left anterior total hip arthroplasty. I does have to be 3 months between injection and surgery. Surgery will need to be later than August 10. She does suffer from She does have a history of Crohn's disease, ulcerative colitis, and has inflammatory disease including ankylosing spondylitis. I did discuss these conditions with her rheumatologist.  She will be off of her immune modulating medications for 10 days before 10 days after surgery. In addition she has seen oncology and will get preoperative radiation to avoid heterotopic ossification. Patient does understand she is at increased risk of infection due to the above mentioned autoimmune disorders and also 3 intra-articular cortisone injections. Also we will need to avoid NSAIDs postoperatively due to ulcerative colitis and Crohn's disease. This will increase her risk of uncontrolled pain postoperatively. Patient's rheumatologist is Dr. Obey Ho [phone]. I personally discussed her care with her to determine treatment plan and timing for medication alterations. I discussed with Pennie Delcid that her history, symptoms, signs and imaging are most consistent with hip arthritis    We reviewed the natural history of these conditions and treatment options ranging from conservative measures (rest, icing, activity modification, physical therapy, pain meds, cortisone injection)  to surgical options. We had a long discussion with the patient about their hip.  We discussed surgical and non surgical options for hip arthritis. The most important thing is to work to maintain their range of motion. Next they can try medications including tylenol and NSAIDs. They can try glucosamine or chondroitin. They should also ice frequently and avoid activities that make their hip hurt. Cortisone injections and Synvisc injections are also options when medicine has failed. We finally discussed surgical options including arthroscopic debridement versus hip replacement. Often the arthritis is too far gone for an arthroscopic debridement and pain relief will be short term. Their ultimate solution will be a hip replacement when they are ready for it. They should put it off until they can no longer stand the pain and when nothing else has worked. Conservative measures have failed. She is not interested in cortisone injections. I think she is an appropriate candidate for surgery due to her ongoing symptoms and dysfunction despite conservative measures. The procedure would be left anterior  [address] Total Hip Arthroplasty      Perioperative considerations include: Pre-operative clearance from medical subspecialty. We reviewed the risks, benefits, alternatives of this approach. We discussed risks including, but not limited to, bleeding, pain, infection, scarring, damage to the neurovascular structures, blood clots, pulmonary embolus, stiffness, implant instability or loosening, implant failure, incomplete relief of pain, and incomplete return of function. We also reviewed the surgical details, expected recovery, and rehabilitation (6-9 months). She expressed understanding and will undergo preoperative medical evaluation and optimization. Electronically signed by Francesca Doherty MD on 8/19/2022 at 12:21 PM  This dictation was generated by voice recognition computer software.   Although all attempts are made to edit the dictation for accuracy, there may be errors in the transcription that are not intended.

## 2022-08-23 ENCOUNTER — HOSPITAL ENCOUNTER (OUTPATIENT)
Dept: PHYSICAL THERAPY | Age: 42
Setting detail: THERAPIES SERIES
Discharge: HOME | End: 2022-08-23
Payer: COMMERCIAL

## 2022-08-23 PROCEDURE — 97161 PT EVAL LOW COMPLEX 20 MIN: CPT

## 2022-08-23 PROCEDURE — 97110 THERAPEUTIC EXERCISES: CPT

## 2022-08-23 PROCEDURE — 97530 THERAPEUTIC ACTIVITIES: CPT

## 2022-08-23 NOTE — PLAN OF CARE
[facility] and [address]  Phone [phone]  Fax [phone]      Physical Therapy Certification    Dear Dr. Suki Wyman,    We had the pleasure of evaluating the following patient for physical therapy services at [address] [place] [address]. A summary of our findings can be found in the initial assessment below. This includes our plan of care. If you have any questions or concerns regarding these findings, please do not hesitate to contact me at the office phone number checked above. Thank you for the referral.       Physician Signature:_______________________________Date:__________________  By signing above (or electronic signature), therapists plan is approved by physician      Patient: Fely Mosquera   : 1980   MRN: [id number]  Referring Physician:  Atilio Griffin MD       Evaluation Date: 2022      Medical Diagnosis Information:  Diagnosis: Z01.818 (ICD-10-CM) - Pre-op testing  M16.12 (ICD-10-CM) - Primary osteoarthritis of left hip   Treatment Diagnosis: M25.552 Left hip pain                                         Insurance information: PT Insurance Information: [facility]     Precautions/ Contra-indications: Ankylosing spondylitis, Crohn's disease      Latex Allergy:  [x]NO      []YES  Preferred Language for Healthcare:   [x]English       []other:    SUBJECTIVE: Patient arrived to physical therapy with c/o L hip pain. Pt plans to have L YUSEF on 22 and is coming to PT for pre operative visit that was recommended by MD. Pt symptoms have been present for years with no know PHILLIP. Has attempted to treat symptoms with 3 cortisone injections to the L hip and most recent May 2022 and no long lasting relief in symptoms. Relevant Medical History: Ulcerative collitis and Crohn/s disease. R YUSEF 2015. Plantar fasciitis R foot.  Ankylosing spondylitis     C-SSRS Triggered by Intake questionnaire (Past 2 wk observation. Intake form has been scanned into medical record. Patient has been instructed to contact their primary care physician regarding ROS issues if not already being addressed at this time. Co-morbidities/Complexities (which will affect course of rehabilitation):   []None           Arthritic conditions   []Rheumatoid arthritis (M05.9)  []Osteoarthritis (M19.91)   Cardiovascular conditions   []Hypertension (I10)  []Hyperlipidemia (E78.5)  []Angina pectoris (I20)  []Atherosclerosis (I70)   Musculoskeletal conditions   []Disc pathology   []Congenital spine pathologies   []Prior surgical intervention  []Osteoporosis (M81.8)  []Osteopenia (M85.8)   Endocrine conditions   []Hypothyroid (E03.9)  []Hyperthyroid Gastrointestinal conditions   []Constipation (U65.45)   Metabolic conditions   []Morbid obesity (E66.01)  []Diabetes type 1(E10.65) or 2 (E11.65)   []Neuropathy (G60.9)     Pulmonary conditions   []Asthma (J45)  []Coughing   []COPD (J44.9)   Psychological Disorders  []Anxiety (F41.9)  []Depression (F32.9)   []Other:   []Other:          Barriers to/and or personal factors that will affect rehab potential:              []Age  []Sex              []Motivation/Lack of Motivation                        [x]Co-Morbidities              []Cognitive Function, education/learning barriers              []Environmental, home barriers              []profession/work barriers  [x]past PT/medical experience  []other:  Justification: Chronic nature of symptoms and co-morbidities are potential barriers to treatment    Falls Risk Assessment (30 days):   [x] Falls Risk assessed and no intervention required.   [] Falls Risk assessed and Patient requires intervention due to being higher risk   TUG score (>12s at risk):     [] Falls education provided, including         ASSESSMENT:   Functional Impairments:     [x]Noted lumbar/proximal hip/LE hypomobility   [x]Decreased LE functional ROM   [x]Decreased core/proximal hip strength and neuromuscular control   [x]Decreased LE functional strength   []Reduced balance/proprioceptive control   []other:      Functional Activity Limitations (from functional questionnaire and intake)   []Reduced ability to tolerate prolonged functional positions   [x]Reduced ability or difficulty with changes of positions or transfers between positions   [x]Reduced ability to maintain good posture and demonstrate good body mechanics with sitting, bending, and lifting   []Reduced ability to sleep   [] Reduced ability or tolerance with driving and/or computer work   []Reduced ability to perform lifting, carrying tasks   [x]Reduced ability to squat   [x]Reduced ability to forward bend   [x]Reduced ability to ambulate prolonged functional periods/distances/surfaces   []Reduced ability to ascend/descend stairs   [x]Reduced ability to run, hop or jump   []other:     Participation Restrictions   []Reduced participation in self care activities   [x]Reduced participation in home management activities   [x]Reduced participation in work activities   [x]Reduced participation in social activities. [x]Reduced participation in sport activities. Classification :    []Signs/symptoms consistent with post-surgical status including decreased ROM, strength and function.    []Signs/symptoms consistent with joint sprain/strain   []Signs/symptoms consistent with patella-femoral syndrome   [x]Signs/symptoms consistent with knee OA/hip OA   []Signs/symptoms consistent with internal derangement of knee/Hip   []Signs/symptoms consistent with functional hip weakness/NMR control      []Signs/symptoms consistent with tendinitis/tendinosis    []signs/symptoms consistent with pathology which may benefit from Dry needling      []other:      Prognosis/Rehab Potential:      []Excellent   [x]Good    []Fair   []Poor    Tolerance of evaluation/treatment:    []Excellent   [x]Good    []Fair   []Poor    Physical Therapy Evaluation Complexity Justification  [x] A history of present problem with:  [] no personal factors and/or comorbidities that impact the plan of care;  [x]1-2 personal factors and/or comorbidities that impact the plan of care  []3 personal factors and/or comorbidities that impact the plan of care  [x] An examination of body systems using standardized tests and measures addressing any of the following: body structures and functions (impairments), activity limitations, and/or participation restrictions;:  [x] a total of 1-2 or more elements   [] a total of 3 or more elements   [] a total of 4 or more elements   [x] A clinical presentation with:  [x] stable and/or uncomplicated characteristics   [] evolving clinical presentation with changing characteristics  [] unstable and unpredictable characteristics;   [x] Clinical decision making of [x] low, [] moderate, [] high complexity using standardized patient assessment instrument and/or measurable assessment of functional outcome. [] EVAL (LOW) 31488 (typically 20 minutes face-to-face)  [] EVAL (MOD) 17897 (typically 30 minutes face-to-face)  [] EVAL (HIGH) 34867 (typically 45 minutes face-to-face)  [] RE-EVAL       PLAN  Frequency/Duration:  Based on subjective report and objective findings pt is appropriate to complete HEP independently prior to surgery. Pt to f/u if she experiences exacerbation in symptoms prior to surgery. Interventions:  [x]  Therapeutic exercise including: strength training, ROM, for Lower extremity and core   [x]  NMR activation and proprioception for LE, Glutes and Core   [x]  Manual therapy as indicated for LE, Hip and spine to include: Dry Needling/IASTM, STM, PROM, Gr I-IV mobilizations, manipulation. [x] Modalities as needed that may include: thermal agents, E-stim, Biofeedback, US, iontophoresis as indicated  [x] Patient education on joint protection, postural re-education, activity modification, progression of HEP.     HEP instruction:   Access Code: [email or web address]  URL: [email or web address]  Date: 08/23/2022  Prepared by: Ysabel Dior    Exercises  Side Lunge Adductor Stretch - 1-3 x daily - 7 x weekly - 1 sets - 10 reps - 10 hold  Half Kneeling Hip Flexor Stretch - 1-3 x daily - 7 x weekly - 1 sets - 3 reps - 30 hold  Hooklying Hamstring Stretch with Strap - 1-3 x daily - 7 x weekly - 1 sets - 3 reps - 30 hold  Supine Hip External Rotation Stretch - 1-3 x daily - 7 x weekly - 1 sets - 10 reps - 10 hold  Beginner Bridge - 1 x daily - 7 x weekly - 3 sets - 10 reps        GOALS:   Patient stated goal: Avoid irritating hip prior to surgery     [] Progressing: [] Met: [] Not Met: [] Adjusted    Therapist goals for Patient:   Short Term Goals: To be achieved in: 2 weeks  1. Independent in HEP and progression per patient tolerance, in order to prevent re-injury. [] Progressing: [] Met: [] Not Met: [] Adjusted   2. Patient will have a decrease in pain to facilitate improvement in movement, function, and ADLs as indicated by Functional Deficits. [] Progressing: [] Met: [] Not Met: [] Adjusted  3. Patient will score 69 or higher on FOTO hip assessment indicating subjective improvement prior to surgical intervention. [] Progressing: [] Met: [] Not Met: [] Adjusted  4. Patient will demonstrate increased L LE flexibility with LE that is equal to R allowing for decrease muscle tension prior to surgical intervention. .    [] Progressing: [] Met: [] Not Met: [] Adjusted        Electronically signed by:  Brandi Escobar PT    Note: If patient does not return for scheduled/ recommended follow up visits, this note will serve as a discharge from care along with most recent update on progress.

## 2022-08-23 NOTE — FLOWSHEET NOTE
The [facility] and [facility], [address] [place] [address] [address] [facility], [address] [place] [address]  Phone: [phone]   Fax:     [phone]    Physical Therapy Daily Treatment Note  Date:  2022    Patient Name:  Andrew Piper    :  1980  MRN: [id number]  Restrictions/Precautions:    Medical/Treatment Diagnosis Information:  Diagnosis: Z01.818 (ICD-10-CM) - Pre-op testing  M16.12 (ICD-10-CM) - Primary osteoarthritis of left hip  Treatment Diagnosis: M25.552 Left hip pain  Insurance/Certification information:  PT Insurance Information: [facility]  Physician Information:   Madeline Grayson MD   Has the plan of care been signed (Y/N):        []  Yes  [x]  No     Date of Patient follow up with Physician: 22 surgery       Is this a Progress Report:     []  Yes  [x]  No        If Yes:  Date Range for reporting period:  Beginning   Ending    Progress report will be due (10 Rx or 30 days whichever is less): 7/3/38      Recertification will be due (POC Duration  / 90 days whichever is less): 22         Visit # Insurance Allowable Auth Required   1 [facility] 60 visits []  Yes [x]  No        Functional Scale: FOTO knee 57  Date assessed:         Latex Allergy:  [x]NO      []YES  Preferred Language for Healthcare:   [x]English       []other:      Pain level:  5/10     SUBJECTIVE:  See eval    OBJECTIVE: See eval  Observation:   Test measurements:      RESTRICTIONS/PRECAUTIONS: L hip OA, Ankylosing spondylitis, Crohn's disease    Exercises/Interventions:     ROM/STRETCHES     Adductor stretch 10\"hx5 L  standing   Hip flexor stretch 30\"hx3 L  half kneel, cues for posterior pelvic tilt    Hamstring stretch  30\"hx3 L  supine   Hip ER 10\"hx5  L  hook lying position                       PREs     Bridges  1x10 bilat                                                            Manual interventions              Therapeutic Exercise and NMR EXR  [x] (58107) Provided verbal/tactile cueing for activities related to strengthening, flexibility, endurance, ROM for improvements in LE, proximal hip, and core control with self care, mobility, lifting, ambulation.  [] (04345) Provided verbal/tactile cueing for activities related to improving balance, coordination, kinesthetic sense, posture, motor skill, proprioception  to assist with LE, proximal hip, and core control in self care, mobility, lifting, ambulation and eccentric single leg control.      NMR and Therapeutic Activities:    [x] (31283 or 36803) Provided verbal/tactile cueing for activities related to improving balance, coordination, kinesthetic sense, posture, motor skill, proprioception and motor activation to allow for proper function of core, proximal hip and LE with self care and ADLs  [] (90727) Gait Re-education- Provided training and instruction to the patient for proper LE, core and proximal hip recruitment and positioning and eccentric body weight control with ambulation re-education including up and down stairs     Home Exercise Program:    [x] (83637) Reviewed/Progressed HEP activities related to strengthening, flexibility, endurance, ROM of core, proximal hip and LE for functional self-care, mobility, lifting and ambulation/stair navigation   [] (85182)Reviewed/Progressed HEP activities related to improving balance, coordination, kinesthetic sense, posture, motor skill, proprioception of core, proximal hip and LE for self care, mobility, lifting, and ambulation/stair navigation      Manual Treatments:  PROM / STM / Oscillations-Mobs:  G-I, II, III, IV (PA's, Inf., Post.)  [] (56969) Provided manual therapy to mobilize LE, proximal hip and/or LS spine soft tissue/joints for the purpose of modulating pain, promoting relaxation,  increasing ROM, reducing/eliminating soft tissue swelling/inflammation/restriction, improving soft tissue extensibility and allowing for proper ROM for normal function with self Other    Prognosis for POC: [x] Good [] Fair  [] Poor      Patient requires continued skilled intervention: [x] Yes  [] No    Treatment/Activity Tolerance:  [x] Patient able to complete treatment  [] Patient limited by fatigue  [] Patient limited by pain     [] Patient limited by other medical complications  [] Other: 8/23 Pt tolerated stretches with no increase in symptoms. Unable to complete piriformis stretch or complete ROM into IR d/t deep pain reported at L hip     Patient Education:              8/23 Educated pt on objective findings and precautions with activity level and working out with  prior to surgery. Reviewed importance of assistive device after having surgery. Educated on importance of stretches bilaterally to decrease tenderness/tightness and decrease irritation at bilateral hips prior to surgery. PLAN: See eval  [] Continue per plan of care [] Alter current plan (see comments above)  [x] Plan of care initiated [] Hold pending MD visit [] Discharge  8/23 Pt to complete HEP independently but pt to f/u with PT if symptoms increase prior to scheduled surgery. Electronically signed by:  Ilsa Nunn PT    Note: If patient does not return for scheduled/ recommended follow up visits, this note will serve as a discharge from care along with most recent update on progress.

## 2022-08-26 ENCOUNTER — HOSPITAL ENCOUNTER (OUTPATIENT)
Age: 42
Discharge: HOME | End: 2022-08-26
Payer: COMMERCIAL

## 2022-08-26 DIAGNOSIS — Z01.818 PRE-OP TESTING: ICD-10-CM

## 2022-08-26 DIAGNOSIS — M16.12 PRIMARY OSTEOARTHRITIS OF LEFT HIP: ICD-10-CM

## 2022-08-26 LAB
ABO/RH: NORMAL
ALBUMIN SERPL-MCNC: 4.1 G/DL (ref 3.4–5)
ANION GAP SERPL CALCULATED.3IONS-SCNC: 10 MMOL/L (ref 3–16)
ANTIBODY SCREEN: NORMAL
APTT: 32.1 SEC (ref 23–34.3)
BACTERIA: ABNORMAL /HPF
BASOPHILS ABSOLUTE: 0.1 K/UL (ref 0–0.2)
BASOPHILS RELATIVE PERCENT: 0.8 %
BILIRUBIN URINE: NEGATIVE
BLOOD, URINE: NEGATIVE
BUN BLDV-MCNC: 12 MG/DL (ref 7–20)
CALCIUM SERPL-MCNC: 9.5 MG/DL (ref 8.3–10.6)
CHLORIDE BLD-SCNC: 104 MMOL/L (ref 99–110)
CLARITY: CLEAR
CO2: 25 MMOL/L (ref 21–32)
COLOR: ABNORMAL
CREAT SERPL-MCNC: 0.6 MG/DL (ref 0.6–1.1)
EKG ATRIAL RATE: 85 BPM
EKG DIAGNOSIS: NORMAL
EKG P AXIS: 28 DEGREES
EKG P-R INTERVAL: 146 MS
EKG Q-T INTERVAL: 422 MS
EKG QRS DURATION: 86 MS
EKG QTC CALCULATION (BAZETT): 502 MS
EKG R AXIS: 6 DEGREES
EKG T AXIS: 6 DEGREES
EKG VENTRICULAR RATE: 85 BPM
EOSINOPHILS ABSOLUTE: 0.1 K/UL (ref 0–0.6)
EOSINOPHILS RELATIVE PERCENT: 1.8 %
EPITHELIAL CELLS, UA: 4 /HPF (ref 0–5)
ESTIMATED AVERAGE GLUCOSE: 105.4 MG/DL
GFR AFRICAN AMERICAN: >60
GFR NON-AFRICAN AMERICAN: >60
GLUCOSE BLD-MCNC: 94 MG/DL (ref 70–99)
GLUCOSE URINE: NEGATIVE MG/DL
HBA1C MFR BLD: 5.3 %
HCT VFR BLD CALC: 42.1 % (ref 36–48)
HEMOGLOBIN: 14 G/DL (ref 12–16)
HYALINE CASTS: 4 /LPF (ref 0–8)
INR BLD: 1 (ref 0.87–1.14)
KETONES, URINE: ABNORMAL MG/DL
LEUKOCYTE ESTERASE, URINE: NEGATIVE
LYMPHOCYTES ABSOLUTE: 1.7 K/UL (ref 1–5.1)
LYMPHOCYTES RELATIVE PERCENT: 26.8 %
MCH RBC QN AUTO: 29.3 PG (ref 26–34)
MCHC RBC AUTO-ENTMCNC: 33.3 G/DL (ref 31–36)
MCV RBC AUTO: 88.2 FL (ref 80–100)
MICROSCOPIC EXAMINATION: YES
MONOCYTES ABSOLUTE: 0.5 K/UL (ref 0–1.3)
MONOCYTES RELATIVE PERCENT: 7.5 %
MUCUS: PRESENT
NEUTROPHILS ABSOLUTE: 4.1 K/UL (ref 1.7–7.7)
NEUTROPHILS RELATIVE PERCENT: 63.1 %
NITRITE, URINE: NEGATIVE
PDW BLD-RTO: 13.7 % (ref 12.4–15.4)
PH UA: 5.5 (ref 5–8)
PLATELET # BLD: 270 K/UL (ref 135–450)
PMV BLD AUTO: 9 FL (ref 5–10.5)
POTASSIUM SERPL-SCNC: 4 MMOL/L (ref 3.5–5.1)
PROTEIN UA: ABNORMAL MG/DL
PROTHROMBIN TIME: 13.1 SEC (ref 11.7–14.5)
RBC # BLD: 4.77 M/UL (ref 4–5.2)
RBC UA: ABNORMAL /HPF (ref 0–4)
SODIUM BLD-SCNC: 139 MMOL/L (ref 136–145)
SPECIFIC GRAVITY UA: >=1.03 (ref 1–1.03)
TRANSFERRIN: 310 MG/DL (ref 200–360)
URINE TYPE: ABNORMAL
UROBILINOGEN, URINE: 0.2 E.U./DL
WBC # BLD: 6.5 K/UL (ref 4–11)
WBC UA: 5 /HPF (ref 0–5)

## 2022-08-26 PROCEDURE — 81001 URINALYSIS AUTO W/SCOPE: CPT

## 2022-08-26 PROCEDURE — 36415 COLL VENOUS BLD VENIPUNCTURE: CPT

## 2022-08-26 PROCEDURE — 80048 BASIC METABOLIC PNL TOTAL CA: CPT

## 2022-08-26 PROCEDURE — 85730 THROMBOPLASTIN TIME PARTIAL: CPT

## 2022-08-26 PROCEDURE — 93010 ELECTROCARDIOGRAM REPORT: CPT | Performed by: INTERNAL MEDICINE

## 2022-08-26 PROCEDURE — 86901 BLOOD TYPING SEROLOGIC RH(D): CPT

## 2022-08-26 PROCEDURE — 84466 ASSAY OF TRANSFERRIN: CPT

## 2022-08-26 PROCEDURE — 86850 RBC ANTIBODY SCREEN: CPT

## 2022-08-26 PROCEDURE — 87086 URINE CULTURE/COLONY COUNT: CPT

## 2022-08-26 PROCEDURE — 83036 HEMOGLOBIN GLYCOSYLATED A1C: CPT

## 2022-08-26 PROCEDURE — 82040 ASSAY OF SERUM ALBUMIN: CPT

## 2022-08-26 PROCEDURE — 85025 COMPLETE CBC W/AUTO DIFF WBC: CPT

## 2022-08-26 PROCEDURE — 86900 BLOOD TYPING SEROLOGIC ABO: CPT

## 2022-08-26 PROCEDURE — 93005 ELECTROCARDIOGRAM TRACING: CPT

## 2022-08-26 PROCEDURE — 85610 PROTHROMBIN TIME: CPT

## 2022-08-26 PROCEDURE — 87081 CULTURE SCREEN ONLY: CPT

## 2022-08-27 LAB — URINE CULTURE, ROUTINE: NORMAL

## 2022-08-28 LAB — MRSA CULTURE ONLY: NORMAL

## 2022-08-29 ENCOUNTER — TELEPHONE (OUTPATIENT)
Dept: ORTHOPEDIC SURGERY | Age: 42
End: 2022-08-29

## 2022-08-29 NOTE — TELEPHONE ENCOUNTER
Orthopedic Nurse Navigator Summary    COVID:  Vaccinated     Patient Name:  Wagner Barajas  Anticipated Date of Surgery:  09/07/22  Attended Pre-op Education Class:  Video sent to patient email  PCP: Lisa Sam MD  Date of PCP visit for H&P: 08/29/22  Is patient in a Pain Management program: No  Review of Medical history reveals history of: GERD, JAYDE, Hyperlipidemia, Crohn's disease    Critical Lab Values  - Hemoglobin (g/dL):  Date: 08/26/22 Value 14.0  - Hematocrit(%): Date: 08/26/22  Value 42.1  - HgbA1C:  Date: 08/26/22  Value 5.3  - Albumin:  Date: 08/26/22  Value 4.1  - BUN:  Date: 08/26/22  Value 12  - Creatinine:  Date: 08/26/22  Value 0.6    MRSA swab 08/26/22- negative    Coronary Artery Disease/HTN/CHF history: No  Cardiologist: No  Cardiac clearance necessary:  NO  Date of cardiac clearance appt:  Final Cardiac recommendations: On any anticoagulation: No    Diabetes History:  No  Most recent HgbA1C: 5.3  Pulmonary:  COPD/Emphysema/Use of home oxygen: No  Alcohol use: No    BMI greater than 40 at time of scheduling: Additional medical concerns:   Additional recommendations for above concerns:  Attended Pre-Hab program:  Yes  Anticipated Discharge Disposition:  Home with OPT  Who will be with patient at home following discharge:  Her partner- Dante Garcia patient already has:  crutches  Bedroom on first or second floor:  first   Bathroom on first or second floor:  first  Weight bearing status:  wbat  Pre-op ambulatory status: painful ambulation  Number of entry steps:  1  Caregiver assistance:  full time    Quinten Valenzuela RN  Date:   08/29/22

## 2022-08-30 NOTE — PROGRESS NOTES
Place patient label inside box (if no patient label, complete below)  Name:  :  MR#:   Kym Palafoxter / PROCEDURE  I (we)Rommel. Nannette Blum (Patient Name) authorize Mattie Doherty MD (Provider / Roland Marlow) and/or such assistants as may be selected by him/her, to perform the following operation/procedure(s): LEFT TOTAL HIP ARTHROPLASTY MINIMALLY INVASIVE DIRECT ANTERIOR       Note: If unable to obtain consent prior to an emergent procedure, document the emergent reason in the medical record. This procedure has been explained to my (our) satisfaction and included in the explanation was: The intended benefit, nature, and extent of the procedure to be performed; The significant risks involved and the probability of success; Alternative procedures and methods of treatment; The dangers and probable consequences of such alternatives (including no procedure or treatment); The expected consequences of the procedure on my future health; Whether other qualified individuals would be performing important surgical tasks and/or whether  would be present to advise or support the procedure. I (we) understand that there are other risks of infection and other serious complications in the pre-operative/procedural and postoperative/procedural stages of my (our) care. I (we) have asked all of the questions which I (we) thought were important in deciding whether or not to undergo treatment or diagnosis. These questions have been answered to my (our) satisfaction. I (we) understand that no assurance can be given that the procedure will be a success, and no guarantee or warranty of success has been given to me (us). It has been explained to me (us) that during the course of the operation/procedure, unforeseen conditions may be revealed that necessitate extension of the original procedure(s) or different procedure(s) than those set forth in Paragraph 1.  I (we) authorize and request that the above-named physician, his/her assistants or his/her designees, perform procedures as necessary and desirable if deemed to be in my (our) best interest.     Revised 8/2/2021                                                                          Page 1 of 2       I acknowledge that health care personnel may be observing this procedure for the purpose of medical education or other specified purposes as may be necessary as requested and/or approved by my (our) physician. I (we) consent to the disposal by the hospital Pathologist of the removed tissue, parts or organs in accordance with hospital policy. I do ____ do not ____ consent to the use of a local infiltration pain blocking agent that will be used by my provider/surgical provider to help alleviate pain during my procedure. I do ____ do not ____ consent to an emergent blood transfusion in the case of a life-threatening situation that requires blood components to be administered. This consent is valid for 24 hours from the beginning of the procedure. This patient does ____ or does not ____ currently have a DNR status/order. If DNR order is in place, obtain Addendum to the Surgical Consent for ALL Patients with a DNR Order to address hill-operative status for limited intervention or DNR suspension.      I have read and fully understand the above Consent for Operation/Procedure and that all blanks were completed before I signed the consent.   _____________________________       _____________________      ____/____am/pm  Signature of Patient or legal representative      Printed Name / Relationship            Date / Time   ____________________________       _____________________      ____/____am/pm  Witness to Signature                                    Printed Name                    Date / Time    If patient is unable to sign or is a minor, complete the following)  Patient is a minor, ____ years of age, or unable to sign because:   ______________________________________________________________________________________________    If a phone consent is obtained, consent will be documented by using two health care professionals, each affirming that the consenting party has no questions and gives consent for the procedure discussed with the physician/provider.   _____________________          ____________________       _____/_____am/pm   2nd witness to phone consent        Printed name           Date / Time    Informed Consent:  I have provided the explanation described above in section 1 to the patient and/or legal representative.  I have provided the patient and/or legal representative with an opportunity to ask any questions about the proposed operation/procedure.   ___________________________          ____________________         ____/____am/pm  Provider / Proceduralist                            Printed name            Date / Time  Revised 8/2/2021                                                                      Page 2 of 2

## 2022-08-30 NOTE — PROGRESS NOTES
Gabbie message left at Dr. Yuko [place] [phone]  for H&P. -db    Routed lab UA done 8/26/22 to Dr. Gabby Aleman

## 2022-08-30 NOTE — PROGRESS NOTES
[facility] PRE-SURGICAL TESTING INSTRUCTIONS                      PRIOR TO PROCEDURE DATE:    1. PLEASE FOLLOW ANY INSTRUCTIONS GIVEN TO YOU PER YOUR SURGEON. 2. Arrange for someone to drive you home and be with you for the first 24 hours after discharge for your safety after your procedure for which you received sedation. Ensure it is someone we can share information with regarding your discharge. NOTE: At this time ONLY 2 ADULTS may accompany you & everyone must be MASKED. 3. You must contact your surgeon for instructions IF:  You are taking any blood thinners, aspirin, anti-inflammatory or vitamins. There is a change in your physical condition such as a cold, fever, rash, cuts, sores, or any other infection, especially near your surgical site. 4. Do not drink alcohol the day before or day of your procedure. Do not use any recreational marijuana at least 24 hours or street drugs (heroin, cocaine) at minimum 5 days prior to your procedure. 5. A Pre-Surgical History and Physical MUST be completed WITHIN 30 DAYS OR LESS prior to your procedure. by your Physician or an Urgent Care        THE DAY OF YOUR PROCEDURE:  1. Follow instructions for ARRIVAL TIME as DIRECTED BY YOUR SURGEON. 2. Enter the MAIN entrance from [address] and follow the signs to the free Parking [employer] or [employer] (offered free of charge 7 am-5pm). 3. Enter the Main Entrance of the hospital (do not enter from the lower level of the parking garage). Upon entrance, check in with the  at the surgical information desk on your LEFT. Bring your insurance card and photo ID to register      4. DO NOT EAT ANYTHING 8 hours prior to arrival for surgery. You may have up to 8 ounces of water 4 hours prior to your arrival for surgery.    NOTE: ALL Gastric, Bariatric & Bowel surgery patients - you MUST follow your surgeon's instructions regarding eating/ drinking as you will have very specific instructions to follow. If you did not receive these, call your surgeon's office immediately. 5. MEDICATIONS:  Take the following medications with a SMALL sip of water: none  Use your usual dose of inhalers the morning of surgery. BRING your rescue inhaler with you to hospital.   Anesthesia does NOT want you to take insulin the morning of surgery. They will control your blood sugar while you are at the hospital. Please contact your ordering physician for instructions regarding your insulin the night before your procedure. If you have an insulin pump, please keep it set on basal rate. Bariatric patient's call your surgeon if on diabetic medications as some may need to be stopped 1 week prior to surgery    6. Do not swallow additional water when brushing teeth. No gum, candy, mints, or ice chips. Refrain from smoking or at least decrease the amount on day of surgery. 7. Morning of surgery:   Take a shower with an antibacterial soap (i.e., Safeguard or Dial) OR your physician may have instructed you to use Hibiclens. Dress in loose, comfortable clothing appropriate for redressing after your procedure. Do not wear jewelry (including body piercings), make-up (especially NO eye make-up), fingernail polish (NO toenail polish if foot/leg surgery), lotion, powders, or metal hairclips. Do not shave or wax for 72 hours prior to procedure near your operative site. Shaving with a razor can irritate your skin and make it easier to develop an infection. On the day of your procedure, any hair that needs to be removed near the surgical site will be 'clipped' by a healthcare worker using a special clipper designed to avoid skin irritation. 8. Dentures, glasses, or contacts will need to be removed before your procedure. Bring cases for your glasses, contacts, dentures, or hearing aids to protect them while you are in surgery. 9. If you use a CPAP, please bring it with you on the day of your procedure.     10. We recommend that valuable personal belongings such as cash, cell phones, e-tablets, or jewelry, be left at home during your stay. The hospital will not be responsible for valuables that are not secured in the hospital safe. However, if your insurance requires a co-pay, you may want to bring a method of payment, i.e., Check or credit card, if you wish to pay your co-pay the day of surgery. 11. If you are to stay overnight, you may bring a bag with personal items. Please have any large items you may need brought in by your family after your arrival to your hospital room. 12. If you have a Living Will or Durable Power of , please bring a copy on the day of your procedure. How we keep you safe and work to prevent surgical site infections:   1. Health care workers should always check your ID bracelet to verify your name and birth date. You will be asked many times to state your name, date of birth, and allergies. 2. Health care workers should always clean their hands with soap or alcohol gel before providing care to you. It is okay to ask anyone if they cleaned their hands before they touch you. 3. You will be actively involved in verifying the type of procedure you are having and ensuring the correct surgical site. This will be confirmed multiple times prior to your procedure. Do NOT meliza your surgery site UNLESS instructed to by your surgeon. 4. When you are in the operating room, your surgical site will be cleansed with a special soap, and in most cases, you will be given an antibiotic before the surgery begins. What to expect AFTER your procedure? 1. Immediately following your procedure, your will be taken to the PACU for the first phase of your recovery. Your nurse will help you recover from any potential side effects of anesthesia, such as extreme drowsiness, changes in your vital signs or breathing patterns.  Nausea, headache, muscle aches, or sore throat may also occur after anesthesia. Your nurse will help you manage these potential side effects. 2. For comfort and safety, arrange to have someone at home with you for the first 24 hours after discharge. 3. You and your family will be given written instructions about your diet, activity, dressing care, medications, and return visits. 4. Once at home, should issues with nausea, pain, or bleeding occur, or should you notice any signs of infection, you should call your surgeon. 5. Always clean your hands before and after caring for your wound. Do not let your family touch your surgery site without cleaning their hands. 6. Narcotic pain medications can cause significant constipation. You may want to add a stool softener to your postoperative medication schedule or speak to your surgeon on how best to manage this SIDE EFFECT. SPECIAL INSTRUCTIONS patient acknowledges understanding of pre op instructions. Thank you for allowing us to care for you. We strive to exceed your expectations in the delivery of care and service provided to you and your family. If you need to contact the [place] [address] staff for any reason, please call us at [phone]    Instructions reviewed with patient during preadmission testing phone interview.   Junie Patel RN.8/30/2022 .1:31 PM      ADDITIONAL EDUCATIONAL INFORMATION REVIEWED PER PHONE WITH YOU AND/OR YOUR FAMILY:  Yes Hibiclens® Bathing Instructions   No Antibacterial Soap

## 2022-08-30 NOTE — PROGRESS NOTES
LMOR TO RETURN CALL SO WE MAY REVIEW HEALTH HISTORY, WHERE/WHEN HISTORY AND PHYSICAL PRE OP -db    UA done 8/26/22 routed  to Dr. Maria Guadalupe Roy    Patient returned call; had history and physical done by NP at [facility] 8/29; will fax it to us. -db

## 2022-09-02 ENCOUNTER — TELEPHONE (OUTPATIENT)
Dept: ORTHOPEDIC SURGERY | Age: 42
End: 2022-09-02

## 2022-09-02 NOTE — TELEPHONE ENCOUNTER
Surgery 09/07/2022 [facility] 12:00   ARRIVAL  10:00 am    Lm please call back to confirm  [phone]    Confirmed    joselin

## 2022-09-06 ENCOUNTER — ANESTHESIA EVENT (OUTPATIENT)
Dept: OPERATING ROOM | Age: 42
DRG: 470 | End: 2022-09-06
Payer: COMMERCIAL

## 2022-09-07 ENCOUNTER — HOSPITAL ENCOUNTER (INPATIENT)
Age: 42
LOS: 2 days | Discharge: HOME | DRG: 470 | End: 2022-09-09
Attending: ORTHOPAEDIC SURGERY | Admitting: ORTHOPAEDIC SURGERY
Payer: COMMERCIAL

## 2022-09-07 ENCOUNTER — APPOINTMENT (OUTPATIENT)
Dept: GENERAL RADIOLOGY | Age: 42
DRG: 470 | End: 2022-09-07
Attending: ORTHOPAEDIC SURGERY
Payer: COMMERCIAL

## 2022-09-07 ENCOUNTER — ANESTHESIA (OUTPATIENT)
Dept: OPERATING ROOM | Age: 42
DRG: 470 | End: 2022-09-07
Payer: COMMERCIAL

## 2022-09-07 DIAGNOSIS — M16.12 PRIMARY OSTEOARTHRITIS OF LEFT HIP: Primary | ICD-10-CM

## 2022-09-07 DIAGNOSIS — M16.12 OSTEOARTHRITIS OF LEFT HIP, UNSPECIFIED OSTEOARTHRITIS TYPE: ICD-10-CM

## 2022-09-07 LAB
ABO/RH: NORMAL
ANTIBODY SCREEN: NORMAL
GLUCOSE BLD-MCNC: 136 MG/DL (ref 70–99)
PERFORMED ON: ABNORMAL
PREGNANCY, URINE: NEGATIVE

## 2022-09-07 PROCEDURE — 86901 BLOOD TYPING SEROLOGIC RH(D): CPT

## 2022-09-07 PROCEDURE — 84703 CHORIONIC GONADOTROPIN ASSAY: CPT

## 2022-09-07 PROCEDURE — 7100000000 HC PACU RECOVERY - FIRST 15 MIN: Performed by: ORTHOPAEDIC SURGERY

## 2022-09-07 PROCEDURE — 36415 COLL VENOUS BLD VENIPUNCTURE: CPT

## 2022-09-07 PROCEDURE — 6370000000 HC RX 637 (ALT 250 FOR IP): Performed by: PHYSICIAN ASSISTANT

## 2022-09-07 PROCEDURE — 6360000002 HC RX W HCPCS: Performed by: PHYSICIAN ASSISTANT

## 2022-09-07 PROCEDURE — 2500000003 HC RX 250 WO HCPCS: Performed by: ORTHOPAEDIC SURGERY

## 2022-09-07 PROCEDURE — C1776 JOINT DEVICE (IMPLANTABLE): HCPCS | Performed by: ORTHOPAEDIC SURGERY

## 2022-09-07 PROCEDURE — 3700000001 HC ADD 15 MINUTES (ANESTHESIA): Performed by: ORTHOPAEDIC SURGERY

## 2022-09-07 PROCEDURE — 85025 COMPLETE CBC W/AUTO DIFF WBC: CPT

## 2022-09-07 PROCEDURE — 3700000000 HC ANESTHESIA ATTENDED CARE: Performed by: ORTHOPAEDIC SURGERY

## 2022-09-07 PROCEDURE — 73502 X-RAY EXAM HIP UNI 2-3 VIEWS: CPT

## 2022-09-07 PROCEDURE — 86850 RBC ANTIBODY SCREEN: CPT

## 2022-09-07 PROCEDURE — 27130 TOTAL HIP ARTHROPLASTY: CPT | Performed by: ORTHOPAEDIC SURGERY

## 2022-09-07 PROCEDURE — 2580000003 HC RX 258: Performed by: ORTHOPAEDIC SURGERY

## 2022-09-07 PROCEDURE — 3600000004 HC SURGERY LEVEL 4 BASE: Performed by: ORTHOPAEDIC SURGERY

## 2022-09-07 PROCEDURE — 6360000002 HC RX W HCPCS

## 2022-09-07 PROCEDURE — 7100000001 HC PACU RECOVERY - ADDTL 15 MIN: Performed by: ORTHOPAEDIC SURGERY

## 2022-09-07 PROCEDURE — 1200000000 HC SEMI PRIVATE

## 2022-09-07 PROCEDURE — 2709999900 HC NON-CHARGEABLE SUPPLY: Performed by: ORTHOPAEDIC SURGERY

## 2022-09-07 PROCEDURE — 72170 X-RAY EXAM OF PELVIS: CPT

## 2022-09-07 PROCEDURE — C9290 INJ, BUPIVACAINE LIPOSOME: HCPCS | Performed by: ORTHOPAEDIC SURGERY

## 2022-09-07 PROCEDURE — 0SRB03Z REPLACEMENT OF LEFT HIP JOINT WITH CERAMIC SYNTHETIC SUBSTITUTE, OPEN APPROACH: ICD-10-PCS | Performed by: ORTHOPAEDIC SURGERY

## 2022-09-07 PROCEDURE — 6360000002 HC RX W HCPCS: Performed by: ANESTHESIOLOGY

## 2022-09-07 PROCEDURE — 3209999900 FLUORO FOR SURGICAL PROCEDURES

## 2022-09-07 PROCEDURE — 2580000003 HC RX 258: Performed by: ANESTHESIOLOGY

## 2022-09-07 PROCEDURE — 2500000003 HC RX 250 WO HCPCS

## 2022-09-07 PROCEDURE — 94150 VITAL CAPACITY TEST: CPT

## 2022-09-07 PROCEDURE — 2580000003 HC RX 258: Performed by: PHYSICIAN ASSISTANT

## 2022-09-07 PROCEDURE — 86900 BLOOD TYPING SEROLOGIC ABO: CPT

## 2022-09-07 PROCEDURE — 3600000014 HC SURGERY LEVEL 4 ADDTL 15MIN: Performed by: ORTHOPAEDIC SURGERY

## 2022-09-07 PROCEDURE — 6360000002 HC RX W HCPCS: Performed by: ORTHOPAEDIC SURGERY

## 2022-09-07 PROCEDURE — 2720000010 HC SURG SUPPLY STERILE: Performed by: ORTHOPAEDIC SURGERY

## 2022-09-07 PROCEDURE — A4217 STERILE WATER/SALINE, 500 ML: HCPCS | Performed by: ORTHOPAEDIC SURGERY

## 2022-09-07 PROCEDURE — 2500000003 HC RX 250 WO HCPCS: Performed by: PHYSICIAN ASSISTANT

## 2022-09-07 DEVICE — HEAD FEM DIA36MM NK L+0MM 12/14 TAPR ACET HIP BIOLOX DELT: Type: IMPLANTABLE DEVICE | Site: HIP | Status: FUNCTIONAL

## 2022-09-07 DEVICE — HIP H2 ADV OTHER HD IMPL CAPPED H2: Type: IMPLANTABLE DEVICE | Site: HIP | Status: FUNCTIONAL

## 2022-09-07 DEVICE — LINER ACET 36 MM HIP NEUT POLYETH G7 VIVACIT E: Type: IMPLANTABLE DEVICE | Site: HIP | Status: FUNCTIONAL

## 2022-09-07 DEVICE — SHELL ACET SZ D DIA50MM 3 H OSSEOTI LIMIT H 2 MOBILITY G7: Type: IMPLANTABLE DEVICE | Site: HIP | Status: FUNCTIONAL

## 2022-09-07 DEVICE — AVENIR COMPLETE STANDARD COLLARLESS SIZE 2: Type: IMPLANTABLE DEVICE | Site: HIP | Status: FUNCTIONAL

## 2022-09-07 RX ORDER — SENNOSIDES 8.8 MG/5ML
5 LIQUID ORAL 2 TIMES DAILY PRN
Status: DISCONTINUED | OUTPATIENT
Start: 2022-09-07 | End: 2022-09-09 | Stop reason: HOSPADM

## 2022-09-07 RX ORDER — SUCCINYLCHOLINE/SOD CL,ISO/PF 200MG/10ML
SYRINGE (ML) INTRAVENOUS PRN
Status: DISCONTINUED | OUTPATIENT
Start: 2022-09-07 | End: 2022-09-07 | Stop reason: SDUPTHER

## 2022-09-07 RX ORDER — SODIUM CHLORIDE 0.9 % (FLUSH) 0.9 %
5-40 SYRINGE (ML) INJECTION PRN
Status: DISCONTINUED | OUTPATIENT
Start: 2022-09-07 | End: 2022-09-07 | Stop reason: HOSPADM

## 2022-09-07 RX ORDER — INSULIN LISPRO 100 [IU]/ML
0.08 INJECTION, SOLUTION INTRAVENOUS; SUBCUTANEOUS
Status: DISCONTINUED | OUTPATIENT
Start: 2022-09-08 | End: 2022-09-09 | Stop reason: HOSPADM

## 2022-09-07 RX ORDER — SODIUM CHLORIDE 9 MG/ML
INJECTION, SOLUTION INTRAVENOUS PRN
Status: DISCONTINUED | OUTPATIENT
Start: 2022-09-07 | End: 2022-09-07 | Stop reason: HOSPADM

## 2022-09-07 RX ORDER — ROCURONIUM BROMIDE 10 MG/ML
INJECTION, SOLUTION INTRAVENOUS PRN
Status: DISCONTINUED | OUTPATIENT
Start: 2022-09-07 | End: 2022-09-07 | Stop reason: SDUPTHER

## 2022-09-07 RX ORDER — LIDOCAINE HYDROCHLORIDE 20 MG/ML
INJECTION, SOLUTION INTRAVENOUS PRN
Status: DISCONTINUED | OUTPATIENT
Start: 2022-09-07 | End: 2022-09-07 | Stop reason: SDUPTHER

## 2022-09-07 RX ORDER — HYDROMORPHONE HCL 110MG/55ML
PATIENT CONTROLLED ANALGESIA SYRINGE INTRAVENOUS PRN
Status: DISCONTINUED | OUTPATIENT
Start: 2022-09-07 | End: 2022-09-07 | Stop reason: SDUPTHER

## 2022-09-07 RX ORDER — PROPOFOL 10 MG/ML
INJECTION, EMULSION INTRAVENOUS PRN
Status: DISCONTINUED | OUTPATIENT
Start: 2022-09-07 | End: 2022-09-07 | Stop reason: SDUPTHER

## 2022-09-07 RX ORDER — MEPERIDINE HYDROCHLORIDE 25 MG/ML
12.5 INJECTION INTRAMUSCULAR; INTRAVENOUS; SUBCUTANEOUS EVERY 5 MIN PRN
Status: DISCONTINUED | OUTPATIENT
Start: 2022-09-07 | End: 2022-09-07 | Stop reason: HOSPADM

## 2022-09-07 RX ORDER — VANCOMYCIN HYDROCHLORIDE 1 G/20ML
INJECTION, POWDER, LYOPHILIZED, FOR SOLUTION INTRAVENOUS PRN
Status: DISCONTINUED | OUTPATIENT
Start: 2022-09-07 | End: 2022-09-07 | Stop reason: HOSPADM

## 2022-09-07 RX ORDER — ONDANSETRON 4 MG/1
4 TABLET, ORALLY DISINTEGRATING ORAL EVERY 8 HOURS PRN
Status: DISCONTINUED | OUTPATIENT
Start: 2022-09-07 | End: 2022-09-09 | Stop reason: HOSPADM

## 2022-09-07 RX ORDER — POLYETHYLENE GLYCOL 3350 17 G/17G
17 POWDER, FOR SOLUTION ORAL DAILY
Status: DISCONTINUED | OUTPATIENT
Start: 2022-09-07 | End: 2022-09-09 | Stop reason: HOSPADM

## 2022-09-07 RX ORDER — MORPHINE SULFATE 2 MG/ML
2 INJECTION, SOLUTION INTRAMUSCULAR; INTRAVENOUS
Status: DISCONTINUED | OUTPATIENT
Start: 2022-09-07 | End: 2022-09-08

## 2022-09-07 RX ORDER — ONDANSETRON 2 MG/ML
4 INJECTION INTRAMUSCULAR; INTRAVENOUS EVERY 6 HOURS PRN
Status: DISCONTINUED | OUTPATIENT
Start: 2022-09-07 | End: 2022-09-09 | Stop reason: HOSPADM

## 2022-09-07 RX ORDER — MAGNESIUM HYDROXIDE 1200 MG/15ML
LIQUID ORAL CONTINUOUS PRN
Status: DISCONTINUED | OUTPATIENT
Start: 2022-09-07 | End: 2022-09-07 | Stop reason: HOSPADM

## 2022-09-07 RX ORDER — OXYCODONE HYDROCHLORIDE 5 MG/1
5 TABLET ORAL EVERY 4 HOURS PRN
Status: DISCONTINUED | OUTPATIENT
Start: 2022-09-07 | End: 2022-09-09 | Stop reason: HOSPADM

## 2022-09-07 RX ORDER — SODIUM CHLORIDE, SODIUM LACTATE, POTASSIUM CHLORIDE, CALCIUM CHLORIDE 600; 310; 30; 20 MG/100ML; MG/100ML; MG/100ML; MG/100ML
INJECTION, SOLUTION INTRAVENOUS CONTINUOUS
Status: DISCONTINUED | OUTPATIENT
Start: 2022-09-07 | End: 2022-09-07 | Stop reason: HOSPADM

## 2022-09-07 RX ORDER — PROCHLORPERAZINE EDISYLATE 5 MG/ML
5 INJECTION INTRAMUSCULAR; INTRAVENOUS
Status: DISCONTINUED | OUTPATIENT
Start: 2022-09-07 | End: 2022-09-07 | Stop reason: HOSPADM

## 2022-09-07 RX ORDER — ONDANSETRON 2 MG/ML
INJECTION INTRAMUSCULAR; INTRAVENOUS PRN
Status: DISCONTINUED | OUTPATIENT
Start: 2022-09-07 | End: 2022-09-07 | Stop reason: SDUPTHER

## 2022-09-07 RX ORDER — DEXAMETHASONE SODIUM PHOSPHATE 4 MG/ML
INJECTION, SOLUTION INTRA-ARTICULAR; INTRALESIONAL; INTRAMUSCULAR; INTRAVENOUS; SOFT TISSUE PRN
Status: DISCONTINUED | OUTPATIENT
Start: 2022-09-07 | End: 2022-09-07 | Stop reason: SDUPTHER

## 2022-09-07 RX ORDER — KETAMINE HCL IN NACL, ISO-OSM 20 MG/2 ML
SYRINGE (ML) INJECTION PRN
Status: DISCONTINUED | OUTPATIENT
Start: 2022-09-07 | End: 2022-09-07 | Stop reason: SDUPTHER

## 2022-09-07 RX ORDER — DEXTROSE MONOHYDRATE 100 MG/ML
INJECTION, SOLUTION INTRAVENOUS CONTINUOUS PRN
Status: DISCONTINUED | OUTPATIENT
Start: 2022-09-07 | End: 2022-09-09 | Stop reason: HOSPADM

## 2022-09-07 RX ORDER — SODIUM CHLORIDE 0.9 % (FLUSH) 0.9 %
5-40 SYRINGE (ML) INJECTION EVERY 12 HOURS SCHEDULED
Status: DISCONTINUED | OUTPATIENT
Start: 2022-09-07 | End: 2022-09-07 | Stop reason: HOSPADM

## 2022-09-07 RX ORDER — OXYCODONE HYDROCHLORIDE 5 MG/1
10 TABLET ORAL EVERY 4 HOURS PRN
Status: DISCONTINUED | OUTPATIENT
Start: 2022-09-07 | End: 2022-09-09 | Stop reason: HOSPADM

## 2022-09-07 RX ORDER — HYDRALAZINE HYDROCHLORIDE 20 MG/ML
10 INJECTION INTRAMUSCULAR; INTRAVENOUS
Status: DISCONTINUED | OUTPATIENT
Start: 2022-09-07 | End: 2022-09-07 | Stop reason: HOSPADM

## 2022-09-07 RX ORDER — SODIUM CHLORIDE 0.9 % (FLUSH) 0.9 %
5-40 SYRINGE (ML) INJECTION EVERY 12 HOURS SCHEDULED
Status: DISCONTINUED | OUTPATIENT
Start: 2022-09-07 | End: 2022-09-09 | Stop reason: HOSPADM

## 2022-09-07 RX ORDER — MORPHINE SULFATE 2 MG/ML
4 INJECTION, SOLUTION INTRAMUSCULAR; INTRAVENOUS
Status: DISCONTINUED | OUTPATIENT
Start: 2022-09-07 | End: 2022-09-08

## 2022-09-07 RX ORDER — SODIUM CHLORIDE 0.9 % (FLUSH) 0.9 %
5-40 SYRINGE (ML) INJECTION PRN
Status: DISCONTINUED | OUTPATIENT
Start: 2022-09-07 | End: 2022-09-09 | Stop reason: HOSPADM

## 2022-09-07 RX ORDER — SODIUM CHLORIDE 9 MG/ML
25 INJECTION, SOLUTION INTRAVENOUS PRN
Status: DISCONTINUED | OUTPATIENT
Start: 2022-09-07 | End: 2022-09-07 | Stop reason: HOSPADM

## 2022-09-07 RX ORDER — SODIUM CHLORIDE 9 MG/ML
INJECTION, SOLUTION INTRAVENOUS PRN
Status: DISCONTINUED | OUTPATIENT
Start: 2022-09-07 | End: 2022-09-09 | Stop reason: HOSPADM

## 2022-09-07 RX ORDER — MELOXICAM 7.5 MG/1
7.5 TABLET ORAL DAILY
Status: COMPLETED | OUTPATIENT
Start: 2022-09-07 | End: 2022-09-09

## 2022-09-07 RX ORDER — ACETAMINOPHEN 325 MG/1
650 TABLET ORAL EVERY 6 HOURS
Status: DISCONTINUED | OUTPATIENT
Start: 2022-09-07 | End: 2022-09-09 | Stop reason: HOSPADM

## 2022-09-07 RX ORDER — INSULIN LISPRO 100 [IU]/ML
0-8 INJECTION, SOLUTION INTRAVENOUS; SUBCUTANEOUS
Status: DISCONTINUED | OUTPATIENT
Start: 2022-09-08 | End: 2022-09-09 | Stop reason: HOSPADM

## 2022-09-07 RX ORDER — INSULIN LISPRO 100 [IU]/ML
0-4 INJECTION, SOLUTION INTRAVENOUS; SUBCUTANEOUS NIGHTLY
Status: DISCONTINUED | OUTPATIENT
Start: 2022-09-07 | End: 2022-09-09 | Stop reason: HOSPADM

## 2022-09-07 RX ORDER — SODIUM CHLORIDE, SODIUM LACTATE, POTASSIUM CHLORIDE, CALCIUM CHLORIDE 600; 310; 30; 20 MG/100ML; MG/100ML; MG/100ML; MG/100ML
INJECTION, SOLUTION INTRAVENOUS CONTINUOUS
Status: DISCONTINUED | OUTPATIENT
Start: 2022-09-07 | End: 2022-09-09 | Stop reason: HOSPADM

## 2022-09-07 RX ADMIN — SODIUM CHLORIDE, PRESERVATIVE FREE 10 ML: 5 INJECTION INTRAVENOUS at 21:35

## 2022-09-07 RX ADMIN — HYDROMORPHONE HYDROCHLORIDE 0.5 MG: 1 INJECTION, SOLUTION INTRAMUSCULAR; INTRAVENOUS; SUBCUTANEOUS at 15:22

## 2022-09-07 RX ADMIN — HYDROMORPHONE HYDROCHLORIDE 0.25 MG: 1 INJECTION, SOLUTION INTRAMUSCULAR; INTRAVENOUS; SUBCUTANEOUS at 18:47

## 2022-09-07 RX ADMIN — SUGAMMADEX 200 MG: 100 INJECTION, SOLUTION INTRAVENOUS at 14:07

## 2022-09-07 RX ADMIN — Medication 20 MG: at 12:34

## 2022-09-07 RX ADMIN — ROCURONIUM BROMIDE 20 MG: 10 INJECTION INTRAVENOUS at 13:18

## 2022-09-07 RX ADMIN — MELOXICAM 7.5 MG: 7.5 TABLET ORAL at 16:45

## 2022-09-07 RX ADMIN — ACETAMINOPHEN 650 MG: 325 TABLET ORAL at 21:08

## 2022-09-07 RX ADMIN — DEXAMETHASONE SODIUM PHOSPHATE 4 MG: 4 INJECTION, SOLUTION INTRAMUSCULAR; INTRAVENOUS at 12:50

## 2022-09-07 RX ADMIN — HYDROMORPHONE HYDROCHLORIDE 0.5 MG: 1 INJECTION, SOLUTION INTRAMUSCULAR; INTRAVENOUS; SUBCUTANEOUS at 16:01

## 2022-09-07 RX ADMIN — TRANEXAMIC ACID 1000 MG: 100 INJECTION INTRAVENOUS at 12:44

## 2022-09-07 RX ADMIN — OXYCODONE 10 MG: 5 TABLET ORAL at 21:14

## 2022-09-07 RX ADMIN — SODIUM CHLORIDE: 9 INJECTION, SOLUTION INTRAVENOUS at 21:32

## 2022-09-07 RX ADMIN — HYDROMORPHONE HYDROCHLORIDE 0.25 MG: 1 INJECTION, SOLUTION INTRAMUSCULAR; INTRAVENOUS; SUBCUTANEOUS at 18:22

## 2022-09-07 RX ADMIN — PROPOFOL 170 MG: 10 INJECTION, EMULSION INTRAVENOUS at 12:34

## 2022-09-07 RX ADMIN — SODIUM CHLORIDE, POTASSIUM CHLORIDE, SODIUM LACTATE AND CALCIUM CHLORIDE: 600; 310; 30; 20 INJECTION, SOLUTION INTRAVENOUS at 11:23

## 2022-09-07 RX ADMIN — ROCURONIUM BROMIDE 5 MG: 10 INJECTION INTRAVENOUS at 12:34

## 2022-09-07 RX ADMIN — LIDOCAINE HYDROCHLORIDE 100 MG: 20 INJECTION, SOLUTION INTRAVENOUS at 12:34

## 2022-09-07 RX ADMIN — Medication 160 MG: at 12:34

## 2022-09-07 RX ADMIN — ROCURONIUM BROMIDE 45 MG: 10 INJECTION INTRAVENOUS at 12:46

## 2022-09-07 RX ADMIN — ONDANSETRON 4 MG: 2 INJECTION INTRAMUSCULAR; INTRAVENOUS at 12:50

## 2022-09-07 RX ADMIN — HYDROMORPHONE HYDROCHLORIDE 1 MG: 2 INJECTION, SOLUTION INTRAMUSCULAR; INTRAVENOUS; SUBCUTANEOUS at 12:28

## 2022-09-07 RX ADMIN — CEFAZOLIN 2000 MG: 2 INJECTION, POWDER, FOR SOLUTION INTRAMUSCULAR; INTRAVENOUS at 12:36

## 2022-09-07 RX ADMIN — HYDROMORPHONE HYDROCHLORIDE 0.5 MG: 1 INJECTION, SOLUTION INTRAMUSCULAR; INTRAVENOUS; SUBCUTANEOUS at 15:08

## 2022-09-07 RX ADMIN — CEFAZOLIN 2000 MG: 2 INJECTION, POWDER, FOR SOLUTION INTRAMUSCULAR; INTRAVENOUS at 21:33

## 2022-09-07 RX ADMIN — HYDROMORPHONE HYDROCHLORIDE 1 MG: 2 INJECTION, SOLUTION INTRAMUSCULAR; INTRAVENOUS; SUBCUTANEOUS at 13:14

## 2022-09-07 ASSESSMENT — PAIN DESCRIPTION - ORIENTATION
ORIENTATION: LEFT

## 2022-09-07 ASSESSMENT — PAIN DESCRIPTION - FREQUENCY
FREQUENCY: CONTINUOUS

## 2022-09-07 ASSESSMENT — PAIN SCALES - GENERAL
PAINLEVEL_OUTOF10: 6
PAINLEVEL_OUTOF10: 8
PAINLEVEL_OUTOF10: 5
PAINLEVEL_OUTOF10: 6
PAINLEVEL_OUTOF10: 0
PAINLEVEL_OUTOF10: 8
PAINLEVEL_OUTOF10: 6
PAINLEVEL_OUTOF10: 8
PAINLEVEL_OUTOF10: 6
PAINLEVEL_OUTOF10: 5
PAINLEVEL_OUTOF10: 8

## 2022-09-07 ASSESSMENT — PAIN DESCRIPTION - PAIN TYPE
TYPE: SURGICAL PAIN

## 2022-09-07 ASSESSMENT — PAIN DESCRIPTION - DESCRIPTORS
DESCRIPTORS: ACHING
DESCRIPTORS: OTHER (COMMENT)
DESCRIPTORS: ACHING

## 2022-09-07 ASSESSMENT — PAIN DESCRIPTION - LOCATION
LOCATION: HIP

## 2022-09-07 NOTE — DISCHARGE INSTRUCTIONS
Total Hip & Bipolar Replacement  Discharge Instructions    To prevent Clot formation, you have been placed on the following medication:  Take aspirin 81 mg twice a day starting day after surgery   Surgical Site Care:  Keep incision clean and dry. May shower on post-op day #3 with waterproof dressing on. Change to new waterproof dressing between 5-7 days. Physical Therapy:  Weight Bearing Status:     Weight bearing as tolerated  Precautions  Per Physical Therapy handout  No straight leg raise   Pain Medications  You were given oxycodone (Oxycontin, Oxyir)  Wean off pain medications as you deem appropriate as long as pain is under control  Be sure to drink plenty of fluids (recommend water) while taking narcotic pain medications to prevent constipation  You may take an over the counter laxative or stool softener as needed to prevent/treat constipation as well, we recommend Senokot S OTC. We recommend that you consider taking these medications the entire time you are taking pain medication. Cold packs/Ice packs/Machine  May be used as much as necessary to reduce swelling/inflammation/soreness  Be sure to have a barrier (cloth, clothing, towel) between your skin/incision and the ice pack to prevent frostbite  Contact office if  Increased redness, swelling, drainage of any kind, and/or pain to surgery site. As well as new onset fevers and or chills. These could signify an infection. Calf or thigh tenderness to touch as well as increased swelling or redness. This could signify a clot formation. Numbness or tingling to an area around the incision site or below the incision site (toes). Any rash appears, increased  or new onset nausea/vomiting occur. This may indicate a reaction to a medication. Phone # [phone]  Follow up with Dr. Jose Avila or Waylon Cobian PA-C at scheduled appointment time. Please continue to use your Incentive Spirometer at home every hour while awake.

## 2022-09-07 NOTE — H&P
[address] Same Day Surgery Update H & P  Department of General Surgery   Surgical Service   Pre-operative History and Physical  Last H & P within the last 30 days. DIAGNOSIS:   Primary osteoarthritis of left hip [M16.12]  Osteoarthritis of left hip, unspecified osteoarthritis type [M16.12]    Procedure(s):  LEFT TOTAL HIP ARTHROPLASTY MINIMALLY INVASIVE DIRECT ANTERIOR    History obtained from: Patient interview and EHR     HISTORY OF PRESENT ILLNESS:   Patient is a 39 y.o. female with chronic left hip pain and limited ROM in the setting of arthrosis. The symptoms have been recalcitrant to conservative treatment and the patient presents today for the above procedure. Illness Screening: Patient denies fever, chills, worsening cough, or close contact with sick individuals. Past Medical History:        Diagnosis Date    Ankylosing spondylitis ([address]     Arthritis     Crohn's     Stotz    Fistula 11/2011    RECTAL    GERD (gastroesophageal reflux disease)     Iritis 2010    Microcytic anemia     Umbilical hernia     Wears glasses      Past Surgical History:        Procedure Laterality Date    ABSCESS DRAINAGE  9-2011    RECTAL    COLONOSCOPY      COLONOSCOPY  11-    COLONOSCOPY  12/2011    JOINT REPLACEMENT Right     HIP    OTHER SURGICAL HISTORY  2007    Terminal ileum resection     RECTAL SURGERY  07/31/2012    RECTAL ADVANCEMENT FLAP    SIGMOIDOSCOPY  9/12/12    FLEXIBLE    SIGMOIDOSCOPY  6/7/2017    flexible sigmoidoscopy    SMALL INTESTINE SURGERY      ileocecectomy    UPPER GASTROINTESTINAL ENDOSCOPY           Medications Prior to Admission:      Prior to Admission medications    Medication Sig Start Date End Date Taking?  Authorizing Provider   mupirocin (BACTROBAN) 2 % ointment Apply twice daily to each nare for the 5 days prior to surgical procedure 6/13/22   Devaughn Betts MD   certolizumab pegol (CIMZIA PREFILLED) 2 X 200 MG/ML KIT injection Inject 200 mg into the skin every 14 days 6/7/19   Dima Beasley MD   EPINEPHrine (EPIPEN) 0.3 MG/0.3ML LOBO injection Inject 0.3 mLs into the muscle once as needed for 1 dose. 1/23/14   Cleveland Medeiros MD         Allergies:  Ciprofloxacin, Infliximab, Remicade [infliximab injection], and Humira [adalimumab]    PHYSICAL EXAM:      /76   Pulse 89   Temp 98.5 °F (36.9 °C) (Oral)   Resp 16   Ht 5' 4\" (1.626 m)   Wt 183 lb 6.4 oz (83.2 kg)   LMP 08/18/2022   SpO2 100%   BMI 31.48 kg/m²      Airway:  Airway patent with no audible stridor    Heart:  Regular rate and rhythm, No murmur noted    Lungs:  No increased work of breathing, good air exchange, clear to auscultation bilaterally, no crackles or wheezing    Abdomen:  Soft, non-distended, non-tender, and no masses palpated    ASSESSMENT AND PLAN    Patient is a 39 y.o. female with above specified procedure planned. 1.  The patients history and physical was obtained and signed off by the pre-admission testing department. Patient seen and focused exam done today- no new changes since last physical exam on 8/25/22    2. Access to ancillary services are available per request of the provider.     MARCELLO Mathew - CNP     9/7/2022

## 2022-09-07 NOTE — PROGRESS NOTES
1430 Admitted to PACU from OR. Connected to monitor. Report at bedside. Oral airway in place - no sign of pain. 1440 Oral airway out. Xray done.

## 2022-09-07 NOTE — PROGRESS NOTES
Pt to [facility] for left total hip surgery. Pt is alert; oriented X 4; speech clear; breathing easily on RA; denies any pain; walks in room with steady gait without assist.  Urine Hcg is 'negative.'  Scrubbed pt's left hip area with IGGY wipes. Placed #20 IV in right top wrist, and T&S X 2 drawn and sent to lab. Pt reports having radiation to left hip yesterday per MD order \"due to the type of arthritis I have. \"  Ancef 2 g IVPB to go to OR with pt. Placed compression stocking to right leg. Ruddy Carbone., RN took over care of pt prior to pt going to surgery.

## 2022-09-07 NOTE — ANESTHESIA POSTPROCEDURE EVALUATION
Department of Anesthesiology  Postprocedure Note    Patient: Ailyn Vargas  MRN: [id number]  YOB: 1980  Date of evaluation: 9/7/2022      Procedure Summary     Date: 09/07/22 Room / Location: [facility] [address]    Anesthesia Start: 3754 Anesthesia Stop: 1940    Procedure: LEFT TOTAL HIP ARTHROPLASTY MINIMALLY INVASIVE DIRECT ANTERIOR (Left: Hip) Diagnosis:       Primary osteoarthritis of left hip      (Primary osteoarthritis of left hip [M16.12])    Surgeons: Tatum Smyth MD Responsible Provider: Dante Gaines DO    Anesthesia Type: general ASA Status: 3          Anesthesia Type: No value filed.     Monica Phase I: Monica Score: 4    Monica Phase II:        Anesthesia Post Evaluation    Patient location during evaluation: PACU  Patient participation: complete - patient participated  Level of consciousness: awake and alert  Pain score: 0  Airway patency: patent  Nausea & Vomiting: no nausea and no vomiting  Complications: no  Cardiovascular status: hemodynamically stable  Respiratory status: acceptable  Hydration status: euvolemic

## 2022-09-07 NOTE — ANESTHESIA PRE PROCEDURE
Department of Anesthesiology  Preprocedure Note       Name:  Tammy Cavanaugh   Age:  39 y.o.  :  1980                                          MRN:  [id number]         Date:  2022      Surgeon: Chico Suarez): Devaughn Betts MD    Procedure: Procedure(s):  LEFT TOTAL HIP ARTHROPLASTY MINIMALLY INVASIVE DIRECT ANTERIOR    Medications prior to admission:   Prior to Admission medications    Medication Sig Start Date End Date Taking? Authorizing Provider   mupirocin (BACTROBAN) 2 % ointment Apply twice daily to each nare for the 5 days prior to surgical procedure 22   Devaughn Betts MD   certolizumab pegol (CIMZIA PREFILLED) 2 X 200 MG/ML KIT injection Inject 200 mg into the skin every 14 days 19   Dima Beasley MD   EPINEPHrine (EPIPEN) 0.3 MG/0.3ML LOBO injection Inject 0.3 mLs into the muscle once as needed for 1 dose.  14   Cleveland Medeiros MD       Current medications:    Current Facility-Administered Medications   Medication Dose Route Frequency Provider Last Rate Last Admin    tranexamic acid (CYKLOKAPRON) 1,000 mg in sodium chloride 0.9 % 60 mL IVPB  1,000 mg IntraVENous On Call to [address] [email or web address]        Followed by   Gamble tranexamic acid (CYKLOKAPRON) 1,000 mg in sodium chloride 0.9 % 60 mL IVPB  1,000 mg IntraVENous On Call to [address] [email or web address], PA-C        sodium chloride flush 0.9 % injection 5-40 mL  5-40 mL IntraVENous 2 times per day Jessica Andujar PA-C        sodium chloride flush 0.9 % injection 5-40 mL  5-40 mL IntraVENous PRN Jessica Andujar PA-C        0.9 % sodium chloride infusion   IntraVENous PRN Jessica Andujar PA-C        ceFAZolin (ANCEF) 2,000 mg in sodium chloride 0.9 % 50 mL IVPB (mini-bag)  2,000 mg IntraVENous On Call to [address] [email or web address], PA-REESE        lactated ringers infusion   IntraVENous Continuous Hubera Alicia,  mL/hr at 22 1123 New Bag at 22 1123    sodium chloride flush 0.9 % injection 5-40 mL  5-40 mL IntraVENous 2 times per day Mendoza Alejandro, DO        sodium chloride flush 0.9 % injection 5-40 mL  5-40 mL IntraVENous PRN Mendoza Alejandro, DO        0.9 % sodium chloride infusion   IntraVENous PRN Mendoza Alejandro, DO           Allergies:     Allergies   Allergen Reactions    Ciprofloxacin Anaphylaxis    Infliximab Anaphylaxis    Remicade [Infliximab Injection] Anaphylaxis    Humira [Adalimumab] Other (See Comments)     Ineffective       Problem List:    Patient Active Problem List   Diagnosis Code    Ankylosing spondylitis ([facility] M45.9    Thoracic or lumbosacral neuritis or radiculitis, unspecified [id number]    Salmonella infection A02.9    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    Pain in joint, pelvic region and thigh M25.559    Crohn's disease (HCC) K50.90    Acute gastritis K29.00    Loss of weight R63.4    Abdominal pain, epigastric R10.13    Depressive disorder, not elsewhere classified F32.9    Pleurisy R09.1    Other and unspecified hyperlipidemia E78.5    Other acne L70.8    Other specified nutritional anemias D53.8    Malaise and fatigue R53.81, R53.83    Other symptoms involving digestive system(787.99) R19.8    Abnormal screening cardiac CT R93.1    Other general medical examination for administrative purposes Z02.89    Encounter for long-term (current) use of other medications Z79.899    Tachycardia R00.0    Ulcerative colitis ([facility] K51.90    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    Primary localized osteoarthrosis, pelvic region and thigh M16.10    History of total hip replacement, right Z96.641    Acute pharyngitis J02.9    High risk medication use Z79.899    Crohn's disease of both small and large intestine with rectal bleeding (HCC) K50.811    Ankylosing spondylitis of lumbosacral region ([facility] M45.7    Chronic pain G89.29    Encounter for therapeutic drug monitoring Z51.81    TMJ locking M26.69    Chronic GERD K21.9    Osteoarthritis of left hip, unspecified osteoarthritis type M16.12       Past Medical History:        Diagnosis Date    [facility]ylosing sp[facility]ylitis ([facility]     Arthritis     Crohn's     Stotz    Fistula 11/2011    RECTAL    GERD (gastroesophageal reflux disease)     Iritis 2010    Microcytic anemia     Umbilical hernia     Wears glasses        Past Surgical History:        Procedure Laterality Date    ABSCESS DRAINAGE  09/01/2011    RECTAL    APPENDECTOMY      COLONOSCOPY      COLONOSCOPY  11/21/2011    COLONOSCOPY  12/01/2011    JOINT REPLACEMENT Right     HIP    OTHER SURGICAL HISTORY  01/01/2007    Terminal ileum resection     RECTAL SURGERY  07/31/2012    RECTAL ADVANCEMENT FLAP    SIGMOIDOSCOPY  09/12/2012    FLEXIBLE    SIGMOIDOSCOPY  6/7/2017    flexible sigmoidoscopy    SMALL INTESTINE SURGERY      ileocecectomy    UPPER GASTROINTESTINAL ENDOSCOPY         Social History:    Social History     Tobacco Use    Smoking status: Never    Smokeless tobacco: Never   Substance Use Topics    Alcohol use: Yes     Comment: 1-2 DRINKS A WEEK                                Counseling given: Not Answered      Vital Signs (Current):   Vitals:    08/30/22 1316 09/07/22 1045   BP:  127/76   Pulse:  89   Resp:  16   Temp:  98.5 °F (36.9 °C)   TempSrc:  Oral   SpO2:  100%   Weight: 180 lb (81.6 kg) 183 lb 6.4 oz (83.2 kg)   Height: 5' 4\" (1.626 m) 5' 4\" (1.626 m)                                              BP Readings from Last 3 Encounters:   09/07/22 127/76   03/21/22 119/81   10/05/21 119/79       NPO Status: Time of last liquid consumption: 2300                        Time of last solid consumption: 2100                        Date of last liquid consumption: 09/06/22                        Date of last solid food consumption: 09/06/22    BMI:   Wt Readings from Last 3 Encounters:   09/07/22 183 lb 6.4 oz (83.2 kg)   08/19/22 179 lb (81.2 kg)   07/21/22 179 lb 12.8 oz (81.6 kg)     Body mass index is 31.48 kg/m².     CBC:   Lab Results   Component Value Date/Time    WBC 6.5 08/26/2022 07:22 AM    RBC 4.77 08/26/2022 07:22 AM    HGB 14.0 08/26/2022 07:22 AM    HCT 42.1 08/26/2022 07:22 AM    MCV 88.2 08/26/2022 07:22 AM    RDW 13.7 08/26/2022 07:22 AM     08/26/2022 07:22 AM       CMP:   Lab Results   Component Value Date/Time     08/26/2022 07:22 AM    K 4.0 08/26/2022 07:22 AM     08/26/2022 07:22 AM    CO2 25 08/26/2022 07:22 AM    BUN 12 08/26/2022 07:22 AM    CREATININE 0.6 08/26/2022 07:22 AM    GFRAA >60 08/26/2022 07:22 AM    GFRAA >60 04/23/2013 04:38 PM    AGRATIO 1.6 04/01/2022 08:46 AM    LABGLOM >60 08/26/2022 07:22 AM    GLUCOSE 94 08/26/2022 07:22 AM    PROT 6.6 04/01/2022 08:46 AM    PROT 8.0 08/29/2011 06:39 PM    CALCIUM 9.5 08/26/2022 07:22 AM    BILITOT 1.1 04/01/2022 08:46 AM    ALKPHOS 44 04/01/2022 08:46 AM    AST 26 04/01/2022 08:46 AM    ALT 33 04/01/2022 08:46 AM       POC Tests: No results for input(s): POCGLU, POCNA, POCK, POCCL, POCBUN, POCHEMO, POCHCT in the last 72 hours.     Coags:   Lab Results   Component Value Date/Time    PROTIME 13.1 08/26/2022 07:22 AM    INR 1.00 08/26/2022 07:22 AM    APTT 32.1 08/26/2022 07:22 AM       HCG (If Applicable):   Lab Results   Component Value Date    PREGTESTUR Negative 09/07/2022        ABGs: No results found for: PHART, PO2ART, SMZ6NJF, FTE3VUL, BEART, S8OWGVKH     Type & Screen (If Applicable):  No results found for: LABABO, LABRH    Drug/Infectious Status (If Applicable):  No results found for: HIV, HEPCAB    COVID-19 Screening (If Applicable):   Lab Results   Component Value Date/Time    COVID19 DETECTED 12/23/2020 10:55 PM    COVID19 Detected 12/23/2020 10:55 PM           Anesthesia Evaluation  Patient summary reviewed and Nursing notes reviewed no history of anesthetic complications:   Airway: Mallampati: III  TM distance: >3 FB   Neck ROM: full  Mouth opening: > = 3 FB   Dental: normal exam         Pulmonary:Negative Pulmonary ROS and normal exam Cardiovascular:  Exercise tolerance: good (>4 METS),       (-)  angina, orthopnea and PND    ECG reviewed  Rhythm: regular  Rate: normal           Beta Blocker:  Not on Beta Blocker         Neuro/Psych:   (+) psychiatric history:depression/anxiety    (-) neuromuscular disease           GI/Hepatic/Renal:   (+) GERD:, PUD,           Endo/Other:    (+) : arthritis:., .                 Abdominal:       Abdomen: soft. Vascular: negative vascular ROS. Other Findings:           Anesthesia Plan      general     ASA 3       Induction: intravenous. MIPS: Postoperative opioids intended and Prophylactic antiemetics administered. Anesthetic plan and risks discussed with patient. Plan discussed with CRNA and attending.                     Randall Landrum DO   9/7/2022

## 2022-09-07 NOTE — OP NOTE
The patient has been battling left hip pain for months to years. Pain has gotten worse recently. Patient has failed all preoperative conservative treatment options. The activities of daily living have been affected quite a bit. Patient wanted to regain mobility and be active as possible. Patient understood the risk benefits and alternatives in detail and wanted to proceed with the above operation. She had marked tendinitis postop after right hip replacement. I counseled patient that this is still possible after left hip replacement. We had completely exhausted all conservative care in an otherwise young closed. She also has a history of ankylosing spondylitis. Preoperatively, we performed radiation within 24 hours preop. Also, we managed her rheumatologic medications. Procedure Details:   I marked the surgical site of the left hip for surgery. He was taken back to the operating theater laid supine the table the bony prominences well-padded. General anesthesia was induced. We transferred the patient to the operating table, [place] bed. X-ray was acquired marking the left hip as the preoperative templating hip. Antibiotics 2 g of Ancef were given. MRSA swab testing was performed preop, and no additional antibiotics were required. Tranexamic acid 1 g was given at start. We began with a direct anterior approach of the hip. Dominguez-Cool interval was taken. We incised the tensor fascia camilla. We bluntly developed a plane between that and the rectus. Lateral edge of the rectus fascia was incised the muscle belly was retracted medially. The underlying fascia was also incised. Underlying vessels lateral circumflex were tied off on each end with silk suture. Electro Bovie cautery was then used to incise through the capsule. A femoral neck osteotomy was performed based on preoperative template. Using a corkscrew device we removed the existing head ball.      We then placed retractors around dead space. 2-0 Vicryl interrupted sutures closed the subcutaneous tissue layer. Monocryl subcuticular suture was then applied. Dermabond Prineo was then used with a nonadhesive dressing. Patient then was reversed in general esthesia transferred back the postoperative care unit without any complications. All instrument counts were correct x2. I was present throughout the entire to the case with the exception of skin closure. PLAN:  - WBAT with assist device  - aspirin 81 mg BID  - ambulate postop with PT  - resume home meds, diet  - f/u scheduled with me in 2-3 weeks  - dispo:  Admit today considering rheumatologic medications, need for multiple doses of IV antibiotic      Electronically signed by Geoff Mc MD on 9/7/2022 at 4:30 PM

## 2022-09-07 NOTE — PROGRESS NOTES
PACU Transfer Note    Vitals:    09/07/22 1930   BP: 123/79   Pulse: 85   Resp: 20   Temp: 97.8 °F (36.6 °C)   SpO2: 99%       In: 723 [P.O.:222;  I.V.:501]  Out: -     Pain assessment:  receiving treatment  Pain Level: 5    Report given to Receiving unit RN.    9/7/2022 7:37 PM

## 2022-09-08 LAB
ANION GAP SERPL CALCULATED.3IONS-SCNC: 11 MMOL/L (ref 3–16)
BASOPHILS ABSOLUTE: 0 K/UL (ref 0–0.2)
BASOPHILS RELATIVE PERCENT: 0.4 %
BUN BLDV-MCNC: 7 MG/DL (ref 7–20)
CALCIUM SERPL-MCNC: 8.2 MG/DL (ref 8.3–10.6)
CHLORIDE BLD-SCNC: 105 MMOL/L (ref 99–110)
CO2: 24 MMOL/L (ref 21–32)
CREAT SERPL-MCNC: 0.7 MG/DL (ref 0.6–1.1)
EOSINOPHILS ABSOLUTE: 0.1 K/UL (ref 0–0.6)
EOSINOPHILS RELATIVE PERCENT: 0.8 %
GFR AFRICAN AMERICAN: >60
GFR NON-AFRICAN AMERICAN: >60
GLUCOSE BLD-MCNC: 101 MG/DL (ref 70–99)
GLUCOSE BLD-MCNC: 102 MG/DL (ref 70–99)
GLUCOSE BLD-MCNC: 136 MG/DL (ref 70–99)
GLUCOSE BLD-MCNC: 91 MG/DL (ref 70–99)
GLUCOSE BLD-MCNC: 92 MG/DL (ref 70–99)
HCT VFR BLD CALC: 34.2 % (ref 36–48)
HEMOGLOBIN: 11.6 G/DL (ref 12–16)
LYMPHOCYTES ABSOLUTE: 1.7 K/UL (ref 1–5.1)
LYMPHOCYTES RELATIVE PERCENT: 14.8 %
MCH RBC QN AUTO: 29.2 PG (ref 26–34)
MCHC RBC AUTO-ENTMCNC: 34 G/DL (ref 31–36)
MCV RBC AUTO: 85.7 FL (ref 80–100)
MONOCYTES ABSOLUTE: 0.9 K/UL (ref 0–1.3)
MONOCYTES RELATIVE PERCENT: 7.7 %
NEUTROPHILS ABSOLUTE: 8.5 K/UL (ref 1.7–7.7)
NEUTROPHILS RELATIVE PERCENT: 76.3 %
PDW BLD-RTO: 13.5 % (ref 12.4–15.4)
PERFORMED ON: ABNORMAL
PERFORMED ON: ABNORMAL
PERFORMED ON: NORMAL
PERFORMED ON: NORMAL
PLATELET # BLD: 216 K/UL (ref 135–450)
PMV BLD AUTO: 8.5 FL (ref 5–10.5)
POTASSIUM REFLEX MAGNESIUM: 4 MMOL/L (ref 3.5–5.1)
RBC # BLD: 3.99 M/UL (ref 4–5.2)
SODIUM BLD-SCNC: 140 MMOL/L (ref 136–145)
WBC # BLD: 11.2 K/UL (ref 4–11)

## 2022-09-08 PROCEDURE — 85025 COMPLETE CBC W/AUTO DIFF WBC: CPT

## 2022-09-08 PROCEDURE — 6370000000 HC RX 637 (ALT 250 FOR IP): Performed by: PHYSICIAN ASSISTANT

## 2022-09-08 PROCEDURE — 97530 THERAPEUTIC ACTIVITIES: CPT

## 2022-09-08 PROCEDURE — 97165 OT EVAL LOW COMPLEX 30 MIN: CPT

## 2022-09-08 PROCEDURE — 97161 PT EVAL LOW COMPLEX 20 MIN: CPT

## 2022-09-08 PROCEDURE — 6360000002 HC RX W HCPCS

## 2022-09-08 PROCEDURE — 2580000003 HC RX 258

## 2022-09-08 PROCEDURE — 1200000000 HC SEMI PRIVATE

## 2022-09-08 PROCEDURE — 97110 THERAPEUTIC EXERCISES: CPT

## 2022-09-08 PROCEDURE — APPNB60 APP NON BILLABLE TIME 46-60 MINS

## 2022-09-08 PROCEDURE — 2580000003 HC RX 258: Performed by: PHYSICIAN ASSISTANT

## 2022-09-08 PROCEDURE — 97535 SELF CARE MNGMENT TRAINING: CPT

## 2022-09-08 PROCEDURE — 80048 BASIC METABOLIC PNL TOTAL CA: CPT

## 2022-09-08 PROCEDURE — 99024 POSTOP FOLLOW-UP VISIT: CPT

## 2022-09-08 PROCEDURE — 6360000002 HC RX W HCPCS: Performed by: PHYSICIAN ASSISTANT

## 2022-09-08 PROCEDURE — 6370000000 HC RX 637 (ALT 250 FOR IP)

## 2022-09-08 PROCEDURE — 36415 COLL VENOUS BLD VENIPUNCTURE: CPT

## 2022-09-08 PROCEDURE — 97116 GAIT TRAINING THERAPY: CPT

## 2022-09-08 RX ORDER — MELOXICAM 7.5 MG/1
7.5 TABLET ORAL DAILY
Qty: 3 TABLET | Refills: 0 | Status: SHIPPED | OUTPATIENT
Start: 2022-09-09 | End: 2022-09-10

## 2022-09-08 RX ORDER — TIZANIDINE 4 MG/1
4 TABLET ORAL EVERY 6 HOURS PRN
Status: DISCONTINUED | OUTPATIENT
Start: 2022-09-08 | End: 2022-09-09 | Stop reason: HOSPADM

## 2022-09-08 RX ORDER — KETOROLAC TROMETHAMINE 30 MG/ML
15 INJECTION, SOLUTION INTRAMUSCULAR; INTRAVENOUS EVERY 6 HOURS PRN
Status: DISCONTINUED | OUTPATIENT
Start: 2022-09-08 | End: 2022-09-09 | Stop reason: HOSPADM

## 2022-09-08 RX ORDER — ONDANSETRON 4 MG/1
4 TABLET, ORALLY DISINTEGRATING ORAL EVERY 8 HOURS PRN
Qty: 12 TABLET | Refills: 0 | Status: SHIPPED | OUTPATIENT
Start: 2022-09-08

## 2022-09-08 RX ORDER — 0.9 % SODIUM CHLORIDE 0.9 %
500 INTRAVENOUS SOLUTION INTRAVENOUS ONCE
Status: COMPLETED | OUTPATIENT
Start: 2022-09-08 | End: 2022-09-08

## 2022-09-08 RX ORDER — ASPIRIN 81 MG/1
81 TABLET ORAL 2 TIMES DAILY
Qty: 60 TABLET | Refills: 0 | Status: SHIPPED | OUTPATIENT
Start: 2022-09-08 | End: 2022-10-08

## 2022-09-08 RX ORDER — OXYCODONE HYDROCHLORIDE 5 MG/1
5 TABLET ORAL EVERY 4 HOURS PRN
Qty: 18 TABLET | Refills: 0 | Status: SHIPPED | OUTPATIENT
Start: 2022-09-08 | End: 2022-09-11

## 2022-09-08 RX ORDER — TIZANIDINE 4 MG/1
4 TABLET ORAL EVERY 6 HOURS PRN
Qty: 12 TABLET | Refills: 0 | Status: SHIPPED | OUTPATIENT
Start: 2022-09-08

## 2022-09-08 RX ORDER — SENNOSIDES 8.8 MG/5ML
5 LIQUID ORAL 2 TIMES DAILY PRN
Qty: 105 ML | Refills: 0 | Status: SHIPPED | OUTPATIENT
Start: 2022-09-08

## 2022-09-08 RX ADMIN — SODIUM CHLORIDE 500 ML: 9 INJECTION, SOLUTION INTRAVENOUS at 11:48

## 2022-09-08 RX ADMIN — OXYCODONE 10 MG: 5 TABLET ORAL at 05:41

## 2022-09-08 RX ADMIN — OXYCODONE 10 MG: 5 TABLET ORAL at 14:01

## 2022-09-08 RX ADMIN — SODIUM CHLORIDE, PRESERVATIVE FREE 10 ML: 5 INJECTION INTRAVENOUS at 20:56

## 2022-09-08 RX ADMIN — ACETAMINOPHEN 650 MG: 325 TABLET ORAL at 08:22

## 2022-09-08 RX ADMIN — ACETAMINOPHEN 650 MG: 325 TABLET ORAL at 20:56

## 2022-09-08 RX ADMIN — MORPHINE SULFATE 4 MG: 2 INJECTION, SOLUTION INTRAMUSCULAR; INTRAVENOUS at 15:17

## 2022-09-08 RX ADMIN — ACETAMINOPHEN 650 MG: 325 TABLET ORAL at 01:23

## 2022-09-08 RX ADMIN — SODIUM CHLORIDE, PRESERVATIVE FREE 10 ML: 5 INJECTION INTRAVENOUS at 22:12

## 2022-09-08 RX ADMIN — SODIUM CHLORIDE: 9 INJECTION, SOLUTION INTRAVENOUS at 04:24

## 2022-09-08 RX ADMIN — OXYCODONE 10 MG: 5 TABLET ORAL at 09:36

## 2022-09-08 RX ADMIN — TIZANIDINE 4 MG: 4 TABLET ORAL at 14:50

## 2022-09-08 RX ADMIN — KETOROLAC TROMETHAMINE 15 MG: 30 INJECTION, SOLUTION INTRAMUSCULAR at 20:56

## 2022-09-08 RX ADMIN — TIZANIDINE 4 MG: 4 TABLET ORAL at 09:35

## 2022-09-08 RX ADMIN — CEFAZOLIN 2000 MG: 2 INJECTION, POWDER, FOR SOLUTION INTRAMUSCULAR; INTRAVENOUS at 04:27

## 2022-09-08 RX ADMIN — OXYCODONE 10 MG: 5 TABLET ORAL at 01:23

## 2022-09-08 RX ADMIN — MORPHINE SULFATE 2 MG: 2 INJECTION, SOLUTION INTRAMUSCULAR; INTRAVENOUS at 02:30

## 2022-09-08 RX ADMIN — SENNOSIDES 8.8 MG: 8.8 LIQUID ORAL at 22:13

## 2022-09-08 RX ADMIN — OXYCODONE 10 MG: 5 TABLET ORAL at 18:14

## 2022-09-08 RX ADMIN — HYDROMORPHONE HYDROCHLORIDE 0.25 MG: 1 INJECTION, SOLUTION INTRAMUSCULAR; INTRAVENOUS; SUBCUTANEOUS at 22:13

## 2022-09-08 RX ADMIN — TIZANIDINE 4 MG: 4 TABLET ORAL at 20:56

## 2022-09-08 RX ADMIN — SODIUM CHLORIDE, PRESERVATIVE FREE 10 ML: 5 INJECTION INTRAVENOUS at 08:23

## 2022-09-08 RX ADMIN — MELOXICAM 7.5 MG: 7.5 TABLET ORAL at 08:23

## 2022-09-08 RX ADMIN — ACETAMINOPHEN 650 MG: 325 TABLET ORAL at 14:02

## 2022-09-08 RX ADMIN — HYDROMORPHONE HYDROCHLORIDE 0.5 MG: 1 INJECTION, SOLUTION INTRAMUSCULAR; INTRAVENOUS; SUBCUTANEOUS at 16:42

## 2022-09-08 ASSESSMENT — PAIN DESCRIPTION - ORIENTATION
ORIENTATION: LEFT

## 2022-09-08 ASSESSMENT — PAIN DESCRIPTION - DESCRIPTORS
DESCRIPTORS: ACHING;THROBBING
DESCRIPTORS: ACHING;THROBBING
DESCRIPTORS: SHARP;THROBBING
DESCRIPTORS: THROBBING;ACHING
DESCRIPTORS: ACHING;SORE
DESCRIPTORS: ACHING;THROBBING
DESCRIPTORS: ACHING;SORE

## 2022-09-08 ASSESSMENT — PAIN SCALES - GENERAL
PAINLEVEL_OUTOF10: 6
PAINLEVEL_OUTOF10: 7
PAINLEVEL_OUTOF10: 8
PAINLEVEL_OUTOF10: 5
PAINLEVEL_OUTOF10: 7
PAINLEVEL_OUTOF10: 7
PAINLEVEL_OUTOF10: 8
PAINLEVEL_OUTOF10: 5
PAINLEVEL_OUTOF10: 8
PAINLEVEL_OUTOF10: 7
PAINLEVEL_OUTOF10: 7
PAINLEVEL_OUTOF10: 8

## 2022-09-08 ASSESSMENT — PAIN DESCRIPTION - LOCATION
LOCATION: HIP
LOCATION: LEG
LOCATION: HIP
LOCATION: HIP
LOCATION: LEG

## 2022-09-08 NOTE — PROGRESS NOTES
Department of Orthopedic Surgery  Physician Assistant   Progress Note    Subjective:     Post-Operative Day: 1 Status Post left Total Hip Arthroplasty  Systemic or Specific Complaints:Patient complaining of pain and muscle spasms in anterior thigh. Also has some posterior buttock pain. Patient states she was unable to sleep secondary to pain. She has been voiding adequally. She denies numbness and tingling in the left lower extremity. She denies nausea or dizziness. Has not worked with PT/OT yet.      Objective:     Patient Vitals for the past 24 hrs:   BP Temp Temp src Pulse Resp SpO2 Height Weight   09/08/22 0635 113/74 98 °F (36.7 °C) Oral 85 16 99 % -- --   09/08/22 0215 119/69 97.7 °F (36.5 °C) Oral 84 16 98 % -- --   09/07/22 2300 113/74 97.8 °F (36.6 °C) Oral 80 16 98 % -- --   09/07/22 1945 127/77 -- Oral 90 16 98 % -- --   09/07/22 1930 123/79 97.8 °F (36.6 °C) -- 85 20 99 % -- --   09/07/22 1915 130/77 -- -- 78 18 99 % -- --   09/07/22 1900 133/75 -- -- 92 15 -- -- --   09/07/22 1847 -- -- -- -- 14 -- -- --   09/07/22 1845 114/73 -- -- 86 -- 98 % -- --   09/07/22 1830 126/74 -- -- 82 13 -- -- --   09/07/22 1822 -- -- -- -- 16 -- -- --   09/07/22 1815 133/74 -- -- 76 14 97 % -- --   09/07/22 1700 128/77 -- -- (!) 102 17 99 % -- --   09/07/22 1645 127/76 -- -- 76 16 100 % -- --   09/07/22 1630 134/79 -- -- 93 19 98 % -- --   09/07/22 1615 129/76 -- -- 87 19 99 % -- --   09/07/22 1600 138/76 -- -- 90 18 97 % -- --   09/07/22 1545 (!) 142/80 -- -- 100 17 99 % -- --   09/07/22 1530 132/83 -- -- 96 19 98 % -- --   09/07/22 1522 -- -- -- -- 11 -- -- --   09/07/22 1515 139/82 -- -- 99 19 99 % -- --   09/07/22 1512 -- -- -- 100 20 99 % -- --   09/07/22 1508 -- -- -- -- 17 -- -- --   09/07/22 1500 (!) 148/89 -- -- 97 12 -- -- --   09/07/22 1445 (!) 147/90 -- -- 96 16 100 % -- --   09/07/22 1440 (!) 144/86 -- -- 99 14 100 % -- --   09/07/22 1435 (!) 143/78 -- -- (!) 104 20 97 % -- --   09/07/22 1430 137/89 97.6 °F (36.4 °C) Temporal 100 16 93 % -- --   09/07/22 1045 127/76 98.5 °F (36.9 °C) Oral 89 16 100 % 5' 4\" (1.626 m) 183 lb 6.4 oz (83.2 kg)       General: alert, appears stated age, cooperative, and no distress   Wound: Post op dressing C/D/I. No surrounding erythema or ecchymosis visible outside of dressing    Motion: Painful range of Motion   DVT Exam: No evidence of DVT seen on physical exam.  No cords or calf tenderness. No significant calf/ankle edema. Patient in bed at time of exam, family member at bedside. Thigh has mild swelling. Compartments are soft and compressible   Has SCDs and compression stockings applied bilaterally. NVI in lower extremity, sensation intact to light touch. Gross motor function in foot and ankle intact. Data Review  CBC:   Lab Results   Component Value Date/Time    WBC 11.2 09/08/2022 06:53 AM    RBC 3.99 09/08/2022 06:53 AM    HGB 11.6 09/08/2022 06:53 AM    HCT 34.2 09/08/2022 06:53 AM     09/08/2022 06:53 AM       Assessment:     Status Post left Total Hip Arthroplasty POD1 with Dr. Carmen Malhotra. Doing well postoperatively. Plan:      1: Continues current post-op course:WBAT with assistive device. 2:  Continue Deep venous thrombosis prophylaxis- ASA 81 mg BID  3: Received 2 doses of Ancef post op. 4:  Continue physical therapy- Anterior precautions, No SLR.   5:  Continue Pain Control- scheduled tylenol. PRN Roxicodone. Added tizanidine for muscle spasms  6: Discharge home today pending PT/OT evaluation.      Jodi Win, [address]

## 2022-09-08 NOTE — PROGRESS NOTES
4 Eyes Admission Assessment     I agree as the admission nurse that 2 RN's have performed a thorough Head to Toe Skin Assessment on the patient. ALL assessment sites listed below have been assessed on admission. Areas assessed by both nurses:   [x]   Head, Face, and Ears   [x]   Shoulders, Back, and Chest  [x]   Arms, Elbows, and Hands   [x]   Coccyx, Sacrum, and Ischium  [x]   Legs, Feet, and Heels        Does the Patient have Skin Breakdown?   No         Reid Prevention initiated:  No   Wound Care Orders initiated:  No      [facility] nurse consulted for Pressure Injury (Stage 3,4, Unstageable, DTI, NWPT, and Complex wounds) or Reid score 18 or lower:  No      Nurse 1 eSignature: Electronically signed by Annel Huff RN on 9/7/22 at 11:08 PM EDT    **SHARE this note so that the co-signing nurse is able to place an eSignature**    Nurse 2 eSignature: Electronically signed by Shawn Arreguin RN on 9/8/22 at 5:23 AM EDT

## 2022-09-08 NOTE — CARE COORDINATION
Case Management Assessment            Discharge Note                    Date / Time of Note: 9/8/2022 2:45 PM                  Discharge Note Completed by: MANDI Gan    Patient Name: Martin Ravi   YOB: 1980  Diagnosis: Primary osteoarthritis of left hip [M16.12]  Osteoarthritis of left hip, unspecified osteoarthritis type [M16.12]   Date / Time: 9/7/2022 10:26 AM    Current PCP: Dub Spatz, MD  Clinic patient: No    Hospitalization in the last 30 days: No    Advance Directives:  Code Status: Full Code  [place] DNR form completed and on chart: Not Indicated    Financial:  Payor: Simona Horta / Plan: [facility] / Product Type: *No Product type* /      Pharmacy:    [facility] [address] [place] [address] [phone] - f [phone]  [address] [address]  Phone: [phone] Fax: [phone]    [address] [place] [address], [address] [place] [address] [phone] - f [phone]  [facility] [address]  Phone: [phone] Fax: [phone] [facility], [address] Marleen Ball [phone] [place] [phone]  [address] [place] [address]  [address] [facility] [address]  Phone: [phone] Fax: [phone]      Assistance purchasing medications?:    Assistance provided by Case Management: None at this time    Does patient want to participate in local refill/ meds to beds program?:      Meds To Beds General Rules:  1. Can ONLY be done Monday- Friday between 8:30am-5pm  2. Prescription(s) must be in pharmacy by 3pm to be filled same day  3. Copy of patient's insurance/ prescription drug card and patient face sheet must be sent along with the prescription(s)  4. Cost of Rx cannot be added to hospital bill. If financial assistance is needed, please contact unit  or ;   or  Mikey Valencia osteoarthritis type [M16.12]    The Patient and/or patient representative Hayley Lynch and her family were provided with a choice of provider and agrees with the discharge plan Yes    Freedom of choice list was provided with basic dialogue that supports the patient's individualized plan of care/goals and shares the quality data associated with the providers.  Yes    Care Transitions patient: No    MANDI Bentley  [facility]  Case Management Department  Ph: [phone]  Fax: [phone]

## 2022-09-08 NOTE — CONSULTS
Consult Note            Date:9/8/2022        Patient Leonel Esqueda     YOB: 1980     Age:41 y.o. Inpatient consult to Hospitalist  Consult performed by: Ubaldo Zaidi MD  Consult ordered by: Brett Clarke PA-C        Chief Complaint     Left hip pain     History Obtained From   patient    History of Present Illness      70-year-old female with a past medical history mentioned below presented to the hospital for elective left hip surgery. Patient is postop day 1. Pain is partially controlled. Patient denies any nausea or vomiting. Patient had lightheadedness event while working with therapy with a low blood pressure for which she was given fluid. Patient denies any chest pain, shortness of breath. Past Medical History     Past Medical History:   Diagnosis Date    Ankylosing spondylitis ([address]     Arthritis     Crohn's     Stotz    Fistula 11/2011    RECTAL    GERD (gastroesophageal reflux disease)     Iritis 2010    Microcytic anemia     Umbilical hernia     Wears glasses         Past Surgical History     Past Surgical History:   Procedure Laterality Date    ABSCESS DRAINAGE  09/01/2011    RECTAL    APPENDECTOMY      COLONOSCOPY      COLONOSCOPY  11/21/2011    COLONOSCOPY  12/01/2011    JOINT REPLACEMENT Right     HIP    OTHER SURGICAL HISTORY  01/01/2007    Terminal ileum resection     RECTAL SURGERY  07/31/2012    RECTAL ADVANCEMENT FLAP    SIGMOIDOSCOPY  09/12/2012    FLEXIBLE    SIGMOIDOSCOPY  6/7/2017    flexible sigmoidoscopy    SMALL INTESTINE SURGERY      ileocecectomy    TOTAL HIP ARTHROPLASTY Left 9/7/2022    LEFT TOTAL HIP ARTHROPLASTY MINIMALLY INVASIVE DIRECT ANTERIOR performed by Destiny Tobias MD at [facility]. 106          Medications     Prior to Admission medications    Medication Sig Start Date End Date Taking?  Authorizing Provider   oxyCODONE (ROXICODONE) 5 MG immediate release tablet Take 1 tablet by mouth every 4 hours as needed for disintegrating tablet 4 mg, Q8H PRN   Or  ondansetron (ZOFRAN) injection 4 mg, Q6H PRN        Allergies   Ciprofloxacin, Infliximab, Remicade [infliximab injection], and Humira [adalimumab]    Social History     Social History       Tobacco History       Smoking Status  Never      Smokeless Tobacco Use  Never              Alcohol History       Alcohol Use Status  Yes Comment  1-2 DRINKS A WEEK              Drug Use       Drug Use Status  Never              Sexual Activity       Sexually Active  Not Asked                    Family History     Family History   Problem Relation Age of Onset    Breast Cancer Mother     Cancer Mother     High Blood Pressure Father     High Cholesterol Father     Diabetes Father     Cancer Paternal Aunt         colon    Diabetes Maternal Grandmother     Cancer Maternal Grandmother         colon    Cancer Maternal Grandfather         colon    High Blood Pressure Paternal Grandmother     Cancer Paternal Grandmother         colon    Breast Cancer Maternal Aunt        Review of Systems   Review of Systems   As mentioned above. Physical Exam   /79   Pulse 90   Temp 98 °F (36.7 °C) (Oral)   Resp 16   Ht 5' 4\" (1.626 m)   Wt 183 lb 6.4 oz (83.2 kg)   LMP 08/18/2022   SpO2 100%   Breastfeeding No   BMI 31.48 kg/m²      Physical Exam  Constitutional:       General: She is not in acute distress. Appearance: Normal appearance. She is not ill-appearing. HENT:      Head: Normocephalic and atraumatic. Cardiovascular:      Rate and Rhythm: Normal rate and regular rhythm. Pulses: Normal pulses. Heart sounds: Normal heart sounds. Pulmonary:      Effort: Pulmonary effort is normal.      Breath sounds: Normal breath sounds. Abdominal:      General: Abdomen is flat. Palpations: Abdomen is soft. Musculoskeletal:         General: No swelling or deformity. Skin:     General: Skin is warm and dry. Neurological:      General: No focal deficit present. Mental Status: She is alert and oriented to person, place, and time. Mental status is at baseline. Psychiatric:         Mood and Affect: Mood normal.         Behavior: Behavior normal.       Labs    CBC:  Recent Labs     09/08/22  0653   WBC 11.2*   RBC 3.99*   HGB 11.6*   HCT 34.2*   MCV 85.7   RDW 13.5        CHEMISTRIES:  Recent Labs     09/08/22  0653      K 4.0      CO2 24   BUN 7   CREATININE 0.7   GLUCOSE 102*     PT/INR:No results for input(s): PROTIME, INR in the last 72 hours. APTT:No results for input(s): APTT in the last 72 hours. LIVER PROFILE:No results for input(s): AST, ALT, BILIDIR, BILITOT, ALKPHOS in the last 72 hours. Imaging/Diagnostics   XR PELVIS (1-2 VIEWS)    Result Date: 9/7/2022  Normal appearance of the bony pelvis. Bilateral hip replacements satisfactory position. Felicita Bloodgood FLUORO FOR SURGICAL PROCEDURES    Result Date: 9/7/2022  1. Operative views detailing total left hip replacement, in satisfactory alignment     XR HIP 2-3 VW W PELVIS LEFT    Result Date: 9/7/2022  1. Operative views detailing total left hip replacement, in satisfactory alignment       Assessment      Hospital Problems             Last Modified POA    * (Principal) Osteoarthritis of left hip, unspecified osteoarthritis type 9/7/2022 Yes       Plan     Left hip osteoarthritis  S/p total hip replacement  Postop day 1. Pain management per pulmonary  DVT prophylaxis: Recommend low molecular weight heparin however will defer to orthopedic surgery to weigh benefits versus risk.   PT OT    Hypotension  Discussed with the nurse, patient received fluid bolus  Will recheck again  Consider as needed fluid boluses, if remains persistently hypotensive  Check morning cortisol      Crohn's disease  On monoclonal antibody therapy  Currently asymptomatic    Patient is euglycemic, will stop insulin        Electronically signed by Neva Barajas MD on 9/8/22 at 2:29 PM EDT

## 2022-09-08 NOTE — PROGRESS NOTES
Patient is alert and oriented and calls appropriately for needs. She complained of pain rated 7/10 this morning but was able to work with therapy and rest comfortably afterward. By 1400 her pain was back at 7/10 and we were unable to gain control with pain medication. IV morphine given with some relief following oxycodone and Zanaflex. Patient became quite tearful and was trying to reposition herself for better comfort. Notified Jasmina MEZA and medication changes were made. Patient at rest in bed currently with call light in reach and bed alarm on for safety.      Electronically signed by Daisha Monroe RN on 9/8/2022 at 4:18 PM

## 2022-09-08 NOTE — PROGRESS NOTES
Occupational Therapy  Facility/Department: [facility] 5T ORTHO/NEURO  Occupational Therapy Initial Assessment/Discharge    Name: Danya Wong  : 1980  MRN: [id number]  Date of Service: 2022    Discharge Recommendations: Danya Wong scored a 19/24 on the AM-PAC ADL Inpatient form. Current research shows that an AM-PAC score of 18 or greater is typically associated with a discharge to the patient's home setting. At this time, this patient demonstrates the endurance and safety to discharge home with 24hr supervision/PRN assistance and a follow up treatment frequency of 2-3x/wk. Please see assessment section for further patient specific details. If patient discharges prior to next session this note will serve as a discharge summary. Please see below for the latest assessment towards goals. OT Equipment Recommendations  Equipment Needed: No       Patient Diagnosis(es): The primary encounter diagnosis was Primary osteoarthritis of left hip. A diagnosis of Osteoarthritis of left hip, unspecified osteoarthritis type was also pertinent to this visit. Past Medical History:  has a past medical history of Ankylosing spondylitis ([facility] [address] Arthritis, Crohn's, Fistula, GERD (gastroesophageal reflux disease), Iritis, Microcytic anemia, Umbilical hernia, and Wears glasses. Past Surgical History:  has a past surgical history that includes other surgical history (2007); Small intestine surgery; Abscess Drainage (2011); Colonoscopy; Upper gastrointestinal endoscopy; Colonoscopy (2011); Colonoscopy (2011); Rectal surgery (2012); sigmoidoscopy (2012); joint replacement (Right); sigmoidoscopy (2017); Appendectomy; and Total hip arthroplasty (Left, 2022). Assessment   Assessment: Pt is POD #1 following L YUSEF anterior approach. Pt slightly below her functional baseline given recent surgery but expected to quickly progress as healing improves.  Pt educated on precautions with good adherence and able to get dressed with only assistance needed for BLE pant mngmt. Pt uses crutches for fx mobility flucuating between CGA-SBA. Pt has 24hr supervision/PRN assistance from spouse in home setting. Recommend pt continue to be up with RN during hospitalization. No further acute OT needs and will sign off. Pt has recommended DME. REQUIRES OT FOLLOW-UP: No  Activity Tolerance  Activity Tolerance: Patient Tolerated treatment well  Activity Tolerance Comments: pt tired at end of session and returned to bed with ice packs donned to L hip               Restrictions  Position Activity Restriction  Other position/activity restrictions: anterior hip precautions; up with assistance    Subjective   General  Chart Reviewed: Yes  Patient assessed for rehabilitation services?: Yes  Additional Pertinent Hx: 39 y.o. female with chronic left hip pain and limited ROM in the setting of arthrosis. The symptoms have been recalcitrant to conservative treatment and the patient presented for LEFT TOTAL HIP ARTHROPLASTY MINIMALLY INVASIVE DIRECT ANTERIOR 9/7. Family / Caregiver Present: No  Referring Practitioner: Cristofer Arce PA-C  Diagnosis: Osteoarthritis of L hip  Subjective  Subjective: Pt seated in chair upon OT arrival and agreeable to OT evaluation. pt reports 6/10 left hip pain at rest, up to 8/10 with mobility. RN in to give pain meds.   Social/Functional History  Social/Functional History  Lives With: Spouse  Type of Home: House  Home Layout: One level  Home Access: Stairs to enter without rails  Entrance Stairs - Number of Steps: 1  Bathroom Shower/Tub: Walk-in shower  Bathroom Toilet: Standard  Bathroom Equipment: Shower chair  Home Equipment: Crutches, Leg , Cane, Sock aid, Reacher  Has the patient had two or more falls in the past year or any fall with injury in the past year?: No  ADL Assistance: Independent  Homemaking Assistance: Independent  Ambulation Assistance: Independent  Transfer Assistance: Independent  Active : Yes  Occupation: Full time employment  Type of Occupation: PA at [employer]  Additional Comments: spouse can provide 24-hr for ~ 1 week       Objective   Heart Rate: 75  Heart Rate Source: Monitor  BP: 96/61  BP Location: Left upper arm  BP Method: Automatic  Patient Position: Supine  MAP (Calculated): 72.67  Resp: 16  SpO2: 98 %  O2 Device: None (Room air)          Observation/Palpation  Observation: dressing donned to L hip  Safety Devices  Type of Devices: Gait belt;Nurse notified; Left in bed  Bed Mobility Training  Bed Mobility Training: Yes  Sit to Supine: Independent  Scooting: Independent  Balance  Sitting: Intact  Standing: Intact (use of crutches for support)  Transfer Training  Transfer Training: Yes  Overall Level of Assistance: Stand-by assistance;Contact-guard assistance  Interventions: Verbal cues  Sit to Stand: Stand-by assistance;Contact-guard assistance (CGA progressing to SBA)  Stand to Sit: Stand-by assistance  Bed to Chair: Stand-by assistance  Toilet Transfer: Stand-by assistance (use of grab bar; pt descended slowly and cautiously)  Gait  Overall Level of Assistance: Contact-guard assistance;Stand-by assistance (CGA progressing to SBA with use of crutches; pt ambulates slowly but demo's good safety awareness)     AROM: Within functional limits  Strength: Within functional limits  Coordination: Within functional limits  Tone: Normal  Sensation: Intact  ADL  Feeding: Independent  Grooming: Stand by assistance  Grooming Skilled Clinical Factors: to stand at sink and wash hands  UE Dressing: Setup;Stand by assistance  UE Dressing Skilled Clinical Factors: to don bra and tshirt  LE Dressing:  Moderate assistance  LE Dressing Skilled Clinical Factors: for initial threading past feet then pt able to perform rest of task  Toileting: Stand by assistance              Vision  Vision: Within Functional Limits  Hearing  Hearing: Within functional limits  Cognition  Overall Cognitive Status: WNL  Orientation  Overall Orientation Status: Within Normal Limits                  Education Given To: Family; Patient  Education Provided: Role of Therapy;Plan of Care;Precautions; ADL Adaptive Strategies;Transfer Training  Education Method: Verbal  Barriers to Learning: None  Education Outcome: Verbalized understanding                        G-Code     OutComes Score                                                  AM-PAC Score        AM-PAC Inpatient Daily Activity Raw Score: 19 (09/08/22 1232)  AM-PAC Inpatient ADL T-Scale Score : 40.22 (09/08/22 1232)  ADL Inpatient CMS 0-100% Score: 42.8 (09/08/22 1232)  ADL Inpatient CMS G-Code Modifier : CK (09/08/22 1232)            Therapy Time   Individual Concurrent Group Co-treatment   Time In 1021         Time Out 1047         Minutes 26         Timed Code Treatment Minutes: 11 Minutes (+ 15 min OT eval)       Ghassan Mejia, OT

## 2022-09-08 NOTE — PROGRESS NOTES
Pt. Oriented x4. VSS on RA. Pt. Has dry dressing on left hip. Pt. Tolerating PO fluids. Pt. Voiding well via bedside commode. Pt. Managing pain per MAR. All fall precautions in place and call light is within reach.

## 2022-09-08 NOTE — PLAN OF CARE
Problem: Pain  Goal: Verbalizes/displays adequate comfort level or baseline comfort level  Outcome: Progressing  Note: Pt. Managing pain per MAR       Problem: Safety - Adult  Goal: Free from fall injury  Outcome: Progressing  Note: Pt. Free from falls this shift. All fall precautions in place and call light is within reach.

## 2022-09-08 NOTE — PROGRESS NOTES
Physical Therapy  Facility/Department: [place] [address]  Physical Therapy Initial Assessment/Treatment    Name: Pham Joy  : 1980  MRN: [id number]  Date of Service: 2022    Discharge Recommendations: Pham Joy scored a 18/24 on the AM-PAC short mobility form. Current research shows that an AM-PAC score of 18 or greater is typically associated with a discharge to the patient's home setting. Based on the patient's AM-PAC score and their current functional mobility deficits, it is recommended that the patient have 2-3 sessions per week of Physical Therapy at d/c to increase the patient's independence. Please see assessment section for further patient specific details. If patient discharges prior to next session this note will serve as a discharge summary. Please see below for the latest assessment towards goals. PT Equipment Recommendations  Equipment Needed: No      Patient Diagnosis(es): The primary encounter diagnosis was Primary osteoarthritis of left hip. A diagnosis of Osteoarthritis of left hip, unspecified osteoarthritis type was also pertinent to this visit. Past Medical History:  has a past medical history of Ankylosing spondylitis ([address] Arthritis, Crohn's, Fistula, GERD (gastroesophageal reflux disease), Iritis, Microcytic anemia, Umbilical hernia, and Wears glasses. Past Surgical History:  has a past surgical history that includes other surgical history (2007); Small intestine surgery; Abscess Drainage (2011); Colonoscopy; Upper gastrointestinal endoscopy; Colonoscopy (2011); Colonoscopy (2011); Rectal surgery (2012); sigmoidoscopy (2012); joint replacement (Right); sigmoidoscopy (2017); Appendectomy; and Total hip arthroplasty (Left, 2022).     Assessment   Body Structures, Functions, Activity Limitations Requiring Skilled Therapeutic Intervention: Decreased functional mobility   Assessment: Pt functioning below baseline s/p THR. Anticipate good progress & that pt will go home with 24-hr assist upon D/C. Will cont PT while here to maximize independence. Treatment Diagnosis: decreased functional mobility  Therapy Prognosis: Good  Decision Making: Low Complexity  Requires PT Follow-Up: Yes  Activity Tolerance  Activity Tolerance:  (pt limited by nausea)     Plan   Plan  Plan: 2 times a day 7 days a week  Current Treatment Recommendations: Transfer training, Strengthening, Gait training, Stair training, Safety education & training  Safety Devices  Type of Devices: Call light within reach, Chair alarm in place, Left in chair, Nurse notified     Restrictions  Position Activity Restriction  Other position/activity restrictions: anterior hip precautions; up with assistance     Subjective   Pain: pt reports 6/10 left hip pain at rest, up to 8/10 with mobility. RN in to give pain meds. General  Chart Reviewed: Yes  Patient assessed for rehabilitation services?: Yes  Additional Pertinent Hx: pt admitted for left THR performed on 9/7  Family / Caregiver Present: Yes (spouse)  Referring Practitioner: Smitha Mendoza  Referral Date : 09/07/22  Diagnosis: left hip OA  Follows Commands: Within Functional Limits  Subjective  Subjective: Pt supine in bed & agreeable to PT.          Social/Functional History  Social/Functional History  Lives With: Spouse  Type of Home: House  Home Layout: One level  Home Access: Stairs to enter without rails  Entrance Stairs - Number of Steps: 1  Bathroom Shower/Tub: Walk-in shower  Bathroom Toilet: Standard  Bathroom Equipment: Shower chair  Home Equipment: Crutches, Leg , Cane, Sock aid, Reacher  Has the patient had two or more falls in the past year or any fall with injury in the past year?: No  ADL Assistance: Independent  Homemaking Assistance: Independent  Ambulation Assistance: Independent  Transfer Assistance: Independent  Active : Yes  Occupation: Full time employment  Type of Occupation: [employer]  Additional Comments: spouse can provide 24-hr for ~ 1 week  Vision/Hearing  Vision  Vision: Within Functional Limits  Hearing  Hearing: Within functional limits    Cognition   Orientation  Overall Orientation Status: Within Normal Limits  Cognition  Overall Cognitive Status: WNL     Objective   Heart Rate: 75  Heart Rate Source: Monitor  BP: 96/61  BP Location: Left upper arm  BP Method: Automatic  Patient Position: Supine  MAP (Calculated): 72.67  Resp: 16  SpO2: 98 %  O2 Device: None (Room air)              AROM RLE (degrees)  RLE AROM: WNL  AROM LLE (degrees)  LLE General AROM: ankle & knee WFL. hip limited d//t sx  Strength RLE  Strength RLE: WFL  Strength LLE  Comment: NT        Bed Mobility Training  Bed Mobility Training: Yes  Supine to Sit: Stand-by assistance (HOB elevated. slow & effortful.)  Sit to Supine: Independent  Scooting: Independent (seated)  Balance  Sitting: Intact  Standing:  (SBA with crutches)  Transfer Training  Transfer Training: Yes  Overall Level of Assistance: Stand-by assistance;Contact-guard assistance  Interventions: Verbal cues  Sit to Stand: Contact-guard assistance;Stand-by assistance (from bed, toilet using bar, chair.)  Stand to Sit: Contact-guard assistance;Stand-by assistance  Bed to Chair: Stand-by assistance  Toilet Transfer: Stand-by assistance (use of grab bar; pt descended slowly and cautiously)  Gait Training  Gait Training: Yes  Gait  Overall Level of Assistance: Contact-guard assistance;Stand-by assistance (pt amb 12 ft, 30 ft, 50 ft with bilat crutches & CGA/SBA. distance limited by nausea, pt diaphoretic.)                 Exercise Treatment: x 10 bilat: QS, GS, ankle pumps. x 10 left LE: LAQ, heel slides       2nd session:  Pt supine in bed & agreeable to PT. C/O 10/10 pain left hip. RN in to give pain meds. Pt & spouse concerned about D/C home d/t poor pain control. Sit->supine SBA with HOB flat. Slow & effortful. Sit<->stand CGA/SBA.  Amb 100 ft x 2 with bilat crutches & CGA/SBA. Up/down 6-in platform step with crutches & CGA. Sit->supine min A. Supine QS, GS, ankle pumps x 10 bilat. AM-PAC Score  AM-PAC Inpatient Mobility Raw Score : 18 (09/08/22 1219)  AM-PAC Inpatient T-Scale Score : 43.63 (09/08/22 1219)  Mobility Inpatient CMS 0-100% Score: 46.58 (09/08/22 1219)  Mobility Inpatient CMS G-Code Modifier : CK (09/08/22 1219)           Goals  Short Term Goals  Time Frame for Short term goals: D/C  Short term goal 1: supine<->sit SUPV  Short term goal 2: sit<->stand SUPV  Short term goal 3: Amb 150 ft with RW SBA  Short term goal 4: up/dowm 1 curb step with RW CGA  Patient Goals   Patient goals : to go home       Education  Patient Education  Education Given To: Patient; Family  Education Provided: Role of Therapy;Plan of Care;Home Exercise Program;Precautions;Transfer Training (gait training)  Education Method: Demonstration;Verbal  Education Outcome: Verbalized understanding;Demonstrated understanding      Therapy Time   Individual Concurrent Group Co-treatment   Time In [phone]         Time Out 0944         Minutes 58               Second Session Therapy Time:   Individual Concurrent Group Co-treatment   Time In 1428         Time Out Mir. Monse Borges 19, PT

## 2022-09-08 NOTE — PLAN OF CARE
Problem: Discharge Planning  Goal: Discharge to home or other facility with appropriate resources  Outcome: Progressing  Flowsheets (Taken 9/8/2022 0950)  Discharge to home or other facility with appropriate resources: Identify barriers to discharge with patient and caregiver  Note: Patient is from home and will have assistance at d/c. She has crutches for ambulation and will have transport to home. Problem: Pain  Goal: Verbalizes/displays adequate comfort level or baseline comfort level  9/8/2022 0950 by Mikayla Rice RN  Outcome: Progressing  Note: Patient complained of pain rated 7/10 after working with therapy. She has pain in her left buttock and left thigh. Medication given per [facility] for relief. Problem: Safety - Adult  Goal: Free from fall injury  9/8/2022 0950 by Mikayla Rice RN  Outcome: Progressing  Note: Patient is alert and oriented x 4 and calls appropriately with any needs or for assistance. Bed alarm and non-skid socks on for safety. Call light in reach.

## 2022-09-09 VITALS
DIASTOLIC BLOOD PRESSURE: 76 MMHG | BODY MASS INDEX: 31.31 KG/M2 | SYSTOLIC BLOOD PRESSURE: 121 MMHG | HEIGHT: 64 IN | WEIGHT: 183.4 LBS | RESPIRATION RATE: 16 BRPM | OXYGEN SATURATION: 100 % | HEART RATE: 87 BPM | TEMPERATURE: 97.9 F

## 2022-09-09 LAB
BASOPHILS ABSOLUTE: 0 K/UL (ref 0–0.2)
BASOPHILS RELATIVE PERCENT: 0.2 %
EOSINOPHILS ABSOLUTE: 0.1 K/UL (ref 0–0.6)
EOSINOPHILS RELATIVE PERCENT: 1.4 %
GLUCOSE BLD-MCNC: 91 MG/DL (ref 70–99)
GLUCOSE BLD-MCNC: 93 MG/DL (ref 70–99)
HCT VFR BLD CALC: 30.8 % (ref 36–48)
HEMOGLOBIN: 10 G/DL (ref 12–16)
LYMPHOCYTES ABSOLUTE: 1.3 K/UL (ref 1–5.1)
LYMPHOCYTES RELATIVE PERCENT: 19 %
MCH RBC QN AUTO: 28.7 PG (ref 26–34)
MCHC RBC AUTO-ENTMCNC: 32.6 G/DL (ref 31–36)
MCV RBC AUTO: 88.1 FL (ref 80–100)
MONOCYTES ABSOLUTE: 0.7 K/UL (ref 0–1.3)
MONOCYTES RELATIVE PERCENT: 9.5 %
NEUTROPHILS ABSOLUTE: 5 K/UL (ref 1.7–7.7)
NEUTROPHILS RELATIVE PERCENT: 69.9 %
PDW BLD-RTO: 13.6 % (ref 12.4–15.4)
PERFORMED ON: NORMAL
PERFORMED ON: NORMAL
PLATELET # BLD: 168 K/UL (ref 135–450)
PMV BLD AUTO: 8.8 FL (ref 5–10.5)
RBC # BLD: 3.5 M/UL (ref 4–5.2)
WBC # BLD: 7.1 K/UL (ref 4–11)

## 2022-09-09 PROCEDURE — 97110 THERAPEUTIC EXERCISES: CPT

## 2022-09-09 PROCEDURE — 97116 GAIT TRAINING THERAPY: CPT

## 2022-09-09 PROCEDURE — 6360000002 HC RX W HCPCS

## 2022-09-09 PROCEDURE — 6370000000 HC RX 637 (ALT 250 FOR IP): Performed by: PHYSICIAN ASSISTANT

## 2022-09-09 PROCEDURE — APPNB60 APP NON BILLABLE TIME 46-60 MINS

## 2022-09-09 PROCEDURE — 2580000003 HC RX 258: Performed by: PHYSICIAN ASSISTANT

## 2022-09-09 PROCEDURE — 99024 POSTOP FOLLOW-UP VISIT: CPT

## 2022-09-09 RX ORDER — METHYLPREDNISOLONE 4 MG/1
TABLET ORAL
Qty: 1 KIT | Refills: 0 | Status: SHIPPED | OUTPATIENT
Start: 2022-09-09 | End: 2022-09-15

## 2022-09-09 RX ADMIN — KETOROLAC TROMETHAMINE 15 MG: 30 INJECTION, SOLUTION INTRAMUSCULAR at 14:22

## 2022-09-09 RX ADMIN — KETOROLAC TROMETHAMINE 15 MG: 30 INJECTION, SOLUTION INTRAMUSCULAR at 09:01

## 2022-09-09 RX ADMIN — ACETAMINOPHEN 650 MG: 325 TABLET ORAL at 14:22

## 2022-09-09 RX ADMIN — POLYETHYLENE GLYCOL 3350 17 G: 17 POWDER, FOR SOLUTION ORAL at 09:01

## 2022-09-09 RX ADMIN — ACETAMINOPHEN 650 MG: 325 TABLET ORAL at 08:18

## 2022-09-09 RX ADMIN — OXYCODONE 5 MG: 5 TABLET ORAL at 12:21

## 2022-09-09 RX ADMIN — KETOROLAC TROMETHAMINE 15 MG: 30 INJECTION, SOLUTION INTRAMUSCULAR at 02:57

## 2022-09-09 RX ADMIN — OXYCODONE 5 MG: 5 TABLET ORAL at 08:18

## 2022-09-09 RX ADMIN — SODIUM CHLORIDE, PRESERVATIVE FREE 10 ML: 5 INJECTION INTRAVENOUS at 08:19

## 2022-09-09 RX ADMIN — MELOXICAM 7.5 MG: 7.5 TABLET ORAL at 08:18

## 2022-09-09 RX ADMIN — ACETAMINOPHEN 650 MG: 325 TABLET ORAL at 02:57

## 2022-09-09 ASSESSMENT — PAIN DESCRIPTION - LOCATION
LOCATION: HIP

## 2022-09-09 ASSESSMENT — PAIN DESCRIPTION - DESCRIPTORS
DESCRIPTORS: ACHING
DESCRIPTORS: ACHING;SORE
DESCRIPTORS: ACHING;BURNING;SORE
DESCRIPTORS: ACHING

## 2022-09-09 ASSESSMENT — PAIN DESCRIPTION - ORIENTATION
ORIENTATION: LEFT

## 2022-09-09 ASSESSMENT — PAIN SCALES - GENERAL
PAINLEVEL_OUTOF10: 5
PAINLEVEL_OUTOF10: 4
PAINLEVEL_OUTOF10: 2
PAINLEVEL_OUTOF10: 3
PAINLEVEL_OUTOF10: 3
PAINLEVEL_OUTOF10: 4

## 2022-09-09 NOTE — PROGRESS NOTES
Department of Orthopedic Surgery  Physician Assistant   Progress Note    Subjective:     Post-Operative Day: 2 Status Post left Total Hip Arthroplasty  Systemic or Specific Complaints: Pain much improved today after addition of IV dilaudid and Toradol yesterday afternoon/evening. Working meaningfully with PT/OT     Objective:     Patient Vitals for the past 24 hrs:   BP Temp Temp src Pulse Resp SpO2   09/09/22 1101 121/76 97.9 °F (36.6 °C) Oral 87 16 100 %   09/09/22 0848 -- -- -- -- 16 --   09/09/22 0639 102/69 98 °F (36.7 °C) Oral 85 16 100 %   09/09/22 0246 103/65 97.9 °F (36.6 °C) Oral 80 16 98 %   09/08/22 2243 -- -- -- -- 17 --   09/08/22 2216 96/63 98.1 °F (36.7 °C) Oral 85 16 98 %   09/08/22 1857 107/67 98.2 °F (36.8 °C) Oral 81 16 98 %       General: alert, appears stated age, cooperative, and no distress   Wound: Post op dressing C/D/I. No surrounding erythema or ecchymosis visible outside of dressing    Motion: Painful range of Motion   DVT Exam: No evidence of DVT seen on physical exam.  No cords or calf tenderness. No significant calf/ankle edema. Patient in bed, fully dressed at time of exam, family member at bedside. Thigh has mild swelling. Compartments are soft and compressible   Compression stockings applied bilaterally. NVI in lower extremity, sensation intact to light touch. Gross motor function in foot and ankle intact. Slight leg length discrepancy, right leg approximately  3-5 mm longer. Could be positional as patient was not laying flat in bed. Data Review  CBC:   Lab Results   Component Value Date/Time    WBC 11.2 09/08/2022 06:53 AM    RBC 3.99 09/08/2022 06:53 AM    HGB 11.6 09/08/2022 06:53 AM    HCT 34.2 09/08/2022 06:53 AM     09/08/2022 06:53 AM       Assessment:     Status Post left Total Hip Arthroplasty POD2 with Dr. Beatriz Spangler. Pain control improved today. Plan:      1: Continues current post-op course:WBAT with assistive device.    2:  Continue Deep venous thrombosis prophylaxis- ASA 81 mg BID for 30 days. 3: Received 2 doses of Ancef post op. 4:  Continue physical therapy- Anterior precautions, No SLR.   5:  Continue Pain Control- scheduled tylenol. PRN Roxicodone. Added tizanidine for muscle spasms, IV dilaudid and Toradol. 6: Discharge: patient feels pain control is adequate to be discharged home today. Follow up outpatient with  in 2 weeks. DCP updated.      Alyse Buitrago

## 2022-09-09 NOTE — PLAN OF CARE
Problem: Discharge Planning  Goal: Discharge to home or other facility with appropriate resources  9/9/2022 0927 by Ray Nagel RN  Outcome: Progressing  Flowsheets (Taken 9/9/2022 8434)  Discharge to home or other facility with appropriate resources: Identify barriers to discharge with patient and caregiver  Note: Patient is ready to discharge to home today. Her pain control is much better today and she feels comfortable to return to home. Problem: Pain  Goal: Verbalizes/displays adequate comfort level or baseline comfort level  9/9/2022 0927 by Ray Nagel RN  Outcome: Progressing  Note: Patient has some pain in left hip but her thigh has a burning pain when ambulating which is worse. Pain medication given for relief and patient has been able to ambulate to and from the bathroom and was able to sit up in the chair comfortably. Problem: Safety - Adult  Goal: Free from fall injury  9/9/2022 0927 by Ray Nagel RN  Outcome: Progressing  Note: Patient is alert and oriented x 4 and calls appropriately with any needs or for assistance. Patient has a steady gait while up with crutches and a gait belt.

## 2022-09-09 NOTE — PROGRESS NOTES
Physical Therapy  Daily Treatment Note    Discharge Recommendations: Fely Mosquera scored a 18/24 on the AM-PAC short mobility form. Current research shows that an AM-PAC score of 18 or greater is typically associated with a discharge to the patient's home setting. Based on the patient's AM-PAC score and their current functional mobility deficits, it is recommended that the patient have 2-3 sessions per week of Physical Therapy at d/c to increase the patient's independence. At this time, this patient demonstrates the endurance and safety to discharge home with continued PT and a follow up treatment frequency of 2-3x/wk. Please see assessment section for further patient specific details. Assessment:  Pt moving well POD #2. Gait steady with B crutches. Good endurance for activity. Did well on stairs using crutches without railing. Good understanding of anterior hip precautions. Plan is for home with initial 24 hour assist of wife. Pt would benefit from continued PT to maximize function. No new DME needs anticipated. Equipment Needs: No--pt has crutches from home. Adjusted to better fit pt's height. Chart Reviewed: Yes     Other position/activity restrictions: anterior hip precautions; up with assistance   Additional Pertinent Hx: pt admitted for left THR performed on 9/7      Diagnosis: left hip OA   Treatment Diagnosis: decreased functional mobility    Subjective: Pt in chair initially. Wife present, but stepped out during session. Pt anticipates going home today. Wife will be able to provide 24 hour assist initially. Pain: 3/10 L hip. Mostly anterior. Objective:    Transfers  Sit to stand: SBA from chair  Stand to sit: SBA onto bed    Ambulation  Assistance Level: SBA  Assistive device: B axillary crutches  Distance: ~200 ft in [place]  Quality of gait: 3 point gait; steady; decreased pace; mild limp L LE    Stairs  Up/down 2 steps with B crutches SBA. Steady.     Bed mobility  Sit to Supine: Min assist for L LE    Exercises  10 reps B ankle pumps, quad sets, glut sets, L heel slides, hip abd/add (mod assist), SAQ in bed    Balance  Static stance with crutches Supervision  Sat EOB with Supervision  Ambulation with crutches SBA    Patient Education  Stair negotiation with crutches. Demonstrated safely. No SLR exercises. Expressed understanding. Reviewed anterior hip precautions. Pt recalled 3/3 independently. Calling for assist with needs. Expressed understanding. Safety Devices  Pt left with needs in reach. In bed with bed alarm on. RN updated. AM-PAC score  AM-PAC Inpatient Mobility Raw Score : 18  AM-PAC Inpatient T-Scale Score : 43.63  Mobility Inpatient CMS 0-100% Score: 46.58  Mobility Inpatient CMS G-Code Modifier : CK    Goals: (as determined and assessed by primary PT)  Time Frame for Short term goals: D/C  Short term goal 1: supine<->sit SUPV   Short term goal 2: sit<->stand SUPV   Short term goal 3: Amb 150 ft with RW SBA  Short term goal 4: up/dowm 1 curb step with RW CGA     Plan:  Plan: 2 times a day 7 days a week  Current Treatment Recommendations: Transfer training, Strengthening, Gait training, Stair training, Safety education & training    Therapy Time    Individual  Concurrent  Group  Co-treatment    Time In  1103            Time Out  1142            Minutes  39              Timed Code Treatment Minutes: 39  Total Treatment Minutes: 39    Anticipate D/C home with spouse today. Will continue per plan of care if not D/Danny. If patient is discharged prior to next treatment, this note will serve as the discharge summary.     [place], [place] [id number]

## 2022-09-09 NOTE — CARE COORDINATION
CM following: Patient had discharge order in yesterday. Did not discharge due to pain. Pain better managed today and patient ready for discharge today. Please reference CM discharge note dated 9/8/2022.   Electronically signed by MANDI Noguera on 9/9/2022 at 2:25 PM  [phone]

## 2022-09-09 NOTE — CONSULTS
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 Pharmacy has been consulted to make pain medication recommendations by [place], PA. Pain scores were 7-8 yesterday at time of consult; improved to 3-5 today. Patient reports that she is feeling much better, ready for discharge today, and comfortable with pain meds prescribed (Meloxicam and oxycodone x 3 days)     Patient's stated pain goal: at goal    Current pain regimen:   Medication  Home med? Amount used last 24 hrs    Acetaminophen 650 mg PO q6h scheduled NO 4 doses (2600 mg)   Ketorolac 15 mg IV q6h PRN NO 3 doses (45mg)   Meloxicam 7.5 mg daily NO 1 dose (7.5 mg)   Tizanidine 4 mg PO q6h PRN NO 2 doses (8 mg)   Hydromorphone  IV  q3h PRN 0.25 mg (moderate)  0.5 mg (severe) NO 1 dose of 0.25mg  1 dose of 0.5 mg   Oxycodone PO q4h PRN  5 mg (moderate)  10 mg (severe) NO 2 doses of 10 mg               Morphine Equivalent Daily Dose: 45 mg     Bowel Regimen:  Miralax 17g daily  Senna syrup BID PRN (one dose last ni)    Assessment/Plan:  1)  Pain regimen recommendations:  Increase APAP to 1000 mg PO q6h scheduled for short time-  discussed with patient  Agree with discharge plan for PO oxycodone PRN and meloxicam for up to 3 days   Pt plans to supplement with ibuprofen PRN after discharge    Will sign off consult now, as patient discharging and pain is well controlled       Please call with any questions:    Brad Martinez. D. BCPS  [phone] (office)  [phone] (wireless)

## 2022-09-09 NOTE — PROGRESS NOTES
Patient is stable for discharge and ready to go home. Reviewed instructions and answered all questions. IV removed and dressing placed. Patient was able to  prescriptions yesterday. Patient wheeled to car for transport to home.      Electronically signed by Maldonado Morelos RN on 9/9/2022 at 2:56 PM

## 2022-09-12 ENCOUNTER — TELEPHONE (OUTPATIENT)
Dept: ORTHOPEDIC SURGERY | Age: 42
End: 2022-09-12

## 2022-09-23 ENCOUNTER — OFFICE VISIT (OUTPATIENT)
Dept: ORTHOPEDIC SURGERY | Age: 42
End: 2022-09-23

## 2022-09-23 VITALS — BODY MASS INDEX: 31.24 KG/M2 | WEIGHT: 183 LBS | HEIGHT: 64 IN

## 2022-09-23 DIAGNOSIS — Z96.642 S/P TOTAL LEFT HIP ARTHROPLASTY: Primary | ICD-10-CM

## 2022-09-23 PROCEDURE — 99024 POSTOP FOLLOW-UP VISIT: CPT | Performed by: ORTHOPAEDIC SURGERY

## 2022-09-23 NOTE — PROGRESS NOTES
Dr Francesca Doherty      Date /Time 9/23/2022       12:12 PM EDT  Name Homa Ruano             1980   Location  [facility]  MRN [id number]                Chief Complaint   Patient presents with    Post-Op Check     LEFT YUSEF 9/7/22        History of Present Illness      Homa Ruano is a 39 y.o. female is here for post-op visit after LEFT      Patient is 2.5 weeks status post left anterior total hip arthroplasty. Patient doing well at this time. Pain controlled. Patient denies fever or chills. She did have some pain control issues immediately after surgery. Patient is doing much better at this time. Physical Exam    Based off 1997 Exam Criteria    Ht 5' 4\" (1.626 m)   Wt 183 lb (83 kg)   BMI 31.41 kg/m²      Constitutional:       General: He is not in acute distress. Appearance: Normal appearance. LEFT Hip: incision clean, intact, healing appropriately. No surrounding  erythema or fluctuance. Neuro intact distal. No evidence of DVT. Imaging       Left Hip: [facility] AT [place]  Radiographs: X-rays were ordered today and reviewed her left hip.  3 views. AP pelvis, lateral, and false profile. They demonstrate a left total hip arthroplasty in good position. No evidence of loosening or periprosthetic fracture. Assessment and Plan    Samantha Alcocer was seen today for post-op check. Diagnoses and all orders for this visit:    S/P total left hip arthroplasty  -     XR HIP LEFT (2-3 VIEWS); Future        Patient is doing well. She will start physical therapy next week. I will see the patient back in 3 months or sooner if problems arise. Electronically signed by Francesca Dohrety MD on 9/23/2022 at 12:12 PM  This dictation was generated by voice recognition computer software. Although all attempts are made to edit the dictation for accuracy, there may be errors in the transcription that are not intended.

## 2022-09-28 ENCOUNTER — TELEPHONE (OUTPATIENT)
Dept: ORTHOPEDIC SURGERY | Age: 42
End: 2022-09-28

## 2022-09-29 ENCOUNTER — HOSPITAL ENCOUNTER (OUTPATIENT)
Dept: PHYSICAL THERAPY | Age: 42
Setting detail: THERAPIES SERIES
Discharge: HOME | End: 2022-09-29
Payer: COMMERCIAL

## 2022-09-29 PROCEDURE — 97164 PT RE-EVAL EST PLAN CARE: CPT

## 2022-09-29 PROCEDURE — 97530 THERAPEUTIC ACTIVITIES: CPT

## 2022-09-29 NOTE — PLAN OF CARE
The [address] [facility],[address], [address] [facility] [address], [address] [facility]  Phone: [phone]   Fax:     [phone]  Physical Therapy Re-Certification Plan of Care    Dear Trinidad Kyle MD  ,    We had the pleasure of treating the following patient for physical therapy services at [facility]. A summary of our findings can be found in the updated assessment below. This includes our plan of care. If you have any questions or concerns regarding these findings, please do not hesitate to contact me at the office phone number checked above. Thank you for the referral.     Physician Signature:________________________________Date:__________________  By signing above (or electronic signature), therapist's plan is approved by physician      Functional Outcome:     FOTO: next visit     Overall Response to Treatment:   []Patient is responding well to treatment and improvement is noted with regards  to goals   []Patient should continue to improve in reasonable time if they continue HEP   []Patient has plateaued and is no longer responding to skilled PT intervention    []Patient is getting worse and would benefit from return to referring MD   []Patient unable to adhere to initial POC   [x]Other: Pt s/p YUSEF on LLE on 9/7/2022. Pt with good strength and ROM on BLE. Presents with decreased functional mobility and activity tolerance due to pain. Altered gait mechanics without an assistive device, not at baseline of function. Pt is on her feet throughout the day at work and ambulates a lot throughout the day. She will benefit from skilled PT to return back to PLOF by improving strength and functional mobility to be ready for work. Date range of Visits: 8/23  Total Visits: eval + re-eval    Recommendation:    [x]Continue PT 2x / wk for 8 weeks.                []Hold PT, pending MD visit          Physical Therapy Daily Treatment Note  Date:  2022    Patient Name:  Keyana Stinson    :  1980  MRN: [id number]  Restrictions/Precautions:    Medical/Treatment Diagnosis Information:  Diagnosis: Z01.818 (ICD-10-CM) - Pre-op testing  M16.12 (ICD-10-CM) - Primary osteoarthritis of left hip  Treatment Diagnosis: M25.552 Left hip pain  Insurance/Certification information:  PT Insurance Information: [facility]  Physician Information:   Micki Garza MD   Has the plan of care been signed (Y/N):        []  Yes  [x]  No     Date of Patient follow up with Physician: 22 surgery       Is this a Progress Report:     []  Yes  [x]  No        If Yes:  Date Range for reporting period:  Beginnin/23  Re-eval:   Ending    Progress report will be due (10 Rx or 30 days whichever is less):       Recertification will be due (POC Duration  / 90 days whichever is less): 2022        Visit # Insurance Allowable Auth Required   1 [facility] 60 visits []  Yes [x]  No        Functional Scale: FOTO knee 57  Date assessed:     Get FOTO next time       Latex Allergy:  [x]NO      []YES  Preferred Language for Healthcare:   [x]English       []other:      Pain level:  2-3/10     SUBJECTIVE:  Pt s/p YUSEF on LLE on 2022. Start work in hospital in 3 weeks on . Pt states she uses single crutch intermittently for longer distances, used crutch on arrival of visit. States she had a YUSEF on R hip years ago. Pain isn't too bad today just sore and tender around incision on L. Pt has not received any therapy since surgery but has been doing some exercises prior to eval like marches, hip abd, and heel slides.      OBJECTIVE: See eval    Functional Testing  Sx Date 22 Prehab Date 22 Post-op Re-Eval Date 22 4 week F/U date  8 week F/U date  D/C Date       TUG (sec)        30 second sit to stand (reps)  16 - UE support on first 8       6 minute walk (m)           Balance:    Narrowed JANY (sec)  WNL      Semi Tandem (sec)  WNL Tandem (sec)  WNL      SLS (sec)  10> BLE             Strength: MMT: hip flex, ER/IR     5/5        Knee flex/ext       5/5    Strength with dynamometer   Knee Ext R: 58.7  Knee Ext L: 42.6    Hip ABD R: 19.4  Hip ABD L: 18.6      Leg length R: 35in   L: 34in  Pt requested, states she has had leg length discrepancies her whole life - ASIS to medial malleolus     Gait: With crutch: WNL           Without crutch: increased trunk lean to the left, NBOS, slight inconsistent step length and decreased bam, increased pain and decreased stability without crutch     Sit <> stand transfer: good performance with B Ue's on chair, but when asked to perform without Ue's, pt with significant L genu valgus collapse and weight shift R    RESTRICTIONS/PRECAUTIONS: L hip OA, Ankylosing spondylitis, Crohn's disease    Exercises/Interventions:     Therapeutic Exercise (26279)  Resistance / level Sets/sec Reps Notes / Cues   Stepper/bike       IB stretch  HSS       SB seated trunk flexion    To improve hip flexion for donning socks/shoes   Lateral band walks       Standing 3 way hip       Hip hikes                                                        Gait (59821)                                   Therapeutic Activities (65765)       Step ups                     Discussed HEP and precautions  23'  Increase walking, standing every hour. Gait training/adjusting crutch   Neuromuscular Re-ed (38632)       Airex       SLS                            Manual Intervention (56914)                                                   HEP:  Access Code: [facility]  URL: [email or web address]  Date: 09/29/2022  Prepared by: Darylene Homes Huml    Exercises  Seated Long Arc Quad - 1 x daily - 7 x weekly - 2 sets - 10 reps - 3 hold  Supine Bridge - 1 x daily - 7 x weekly - 2 sets - 10 reps - 2 hold  Supine Heel Slide - 1 x daily - 7 x weekly - 2 sets - 10 reps - 3 hold      Therapeutic Exercise and NMR EXR  [] (59701) Provided verbal/tactile cueing for activities related to strengthening, flexibility, endurance, ROM for improvements in LE, proximal hip, and core control with self care, mobility, lifting, ambulation.  [] (82347) Provided verbal/tactile cueing for activities related to improving balance, coordination, kinesthetic sense, posture, motor skill, proprioception  to assist with LE, proximal hip, and core control in self care, mobility, lifting, ambulation and eccentric single leg control. NMR and Therapeutic Activities:    [x] (09879 or 63383) Provided verbal/tactile cueing for activities related to improving balance, coordination, kinesthetic sense, posture, motor skill, proprioception and motor activation to allow for proper function of core, proximal hip and LE with self care and ADLs  [] (06888) Gait Re-education- Provided training and instruction to the patient for proper LE, core and proximal hip recruitment and positioning and eccentric body weight control with ambulation re-education including up and down stairs     Home Exercise Program:    [x] (61131) Reviewed/Progressed HEP activities related to strengthening, flexibility, endurance, ROM of core, proximal hip and LE for functional self-care, mobility, lifting and ambulation/stair navigation   [] (32833)Reviewed/Progressed HEP activities related to improving balance, coordination, kinesthetic sense, posture, motor skill, proprioception of core, proximal hip and LE for self care, mobility, lifting, and ambulation/stair navigation      Manual Treatments:  PROM / STM / Oscillations-Mobs:  G-I, II, III, IV (PA's, Inf., Post.)  [] (87954) Provided manual therapy to mobilize LE, proximal hip and/or LS spine soft tissue/joints for the purpose of modulating pain, promoting relaxation,  increasing ROM, reducing/eliminating soft tissue swelling/inflammation/restriction, improving soft tissue extensibility and allowing for proper ROM for normal function with self care, mobility, lifting and ambulation. Modalities:      Charges:  Timed Code Treatment Minutes: 23   Total Treatment Minutes: 40       [] EVAL (LOW) 90065 (typically 20 minutes face-to-face)  [] EVAL (MOD) 71593 (typically 30 minutes face-to-face)  [] EVAL (HIGH) 42600 (typically 45 minutes face-to-face)  [x] RE-EVAL     [] FT(84970) x   [] IONTO  [] NMR (85528) x     [] VASO  [] Manual (63516) x      [] Other:  [x] TA x 2   [] Mech Traction (02175)  [] ES(attended) (26702)      [] ES (un) (22924):     GOALS:   Patient stated goal: Getting back to prior level of function pain free  [] Progressing: [] Met: [] Not Met: [] Adjusted     Therapist goals for Patient:   Long Term Goals: To be achieved in: 8 weeks  1. Independent in HEP and progression per patient tolerance, in order to prevent re-injury. [] Progressing: [] Met: [] Not Met: [] Adjusted   2. Patient will have a decrease in pain to facilitate improvement in movement, function, and ADLs as indicated by Functional Deficits. [] Progressing: [] Met: [] Not Met: [] Adjusted  3. Patient will score 70 or higher on FOTO hip assessment indicating subjective improvement prior to surgical intervention. [] Progressing: [] Met: [] Not Met: [] Adjusted  4. Patient will demonstrate increased L LE flexibility with LE that is equal to R allowing for decrease muscle tension prior to surgical intervention. .    [] Progressing: [] Met: [] Not Met: [] Adjusted  4. Patient will be able to ambulate without device 500 feet without seated rest break with baseline mechanics . [] Progressing: [] Met: [] Not Met: [] Adjusted    Overall Progression Towards Functional goals/ Treatment Progress Update:  [] Patient is progressing as expected towards functional goals listed. [] Progression is slowed due to complexities/Impairments listed. [] Progression has been slowed due to co-morbidities.   [x] Plan just implemented, too soon to assess goals progression <30days   [] Goals require adjustment due to lack of progress  [] Patient is not progressing as expected and requires additional follow up with physician  [] Other    Prognosis for POC: [x] Good [] Fair  [] Poor      Patient requires continued skilled intervention: [x] Yes  [] No    Assessment: see re-eval above for details. Treatment/Activity Tolerance:  [x] Patient able to complete treatment  [] Patient limited by fatigue  [] Patient limited by pain     [] Patient limited by other medical complications  [] Other: 8/23 Pt tolerated stretches with no increase in symptoms. Unable to complete piriformis stretch or complete ROM into IR d/t deep pain reported at L hip     Patient Education:              8/23 Educated pt on objective findings and precautions with activity level and working out with  prior to surgery. Reviewed importance of assistive device after having surgery. Educated on importance of stretches bilaterally to decrease tenderness/tightness and decrease irritation at bilateral hips prior to surgery. 9/29: precautions, increasing walking, HEP, ice vs heat      PLAN:   [] Continue per plan of care [x] Alter current plan (see above)  [] Plan of care initiated [] Hold pending MD visit [] Discharge  8/23 Pt to complete HEP independently but pt to f/u with PT if symptoms increase prior to scheduled surgery. Electronically signed by:  Bishnu Toro, PT  Therapist was present, directed the patient's care, made skilled judgement, and was responsible for assessment and treatment of the patient. Nilsa Viveros, SPT     Note: If patient does not return for scheduled/ recommended follow up visits, this note will serve as a discharge from care along with most recent update on progress.

## 2022-10-04 ENCOUNTER — HOSPITAL ENCOUNTER (OUTPATIENT)
Dept: PHYSICAL THERAPY | Age: 42
Setting detail: THERAPIES SERIES
Discharge: HOME | End: 2022-10-04
Payer: COMMERCIAL

## 2022-10-04 PROCEDURE — 97140 MANUAL THERAPY 1/> REGIONS: CPT | Performed by: SPECIALIST/TECHNOLOGIST

## 2022-10-04 PROCEDURE — 97110 THERAPEUTIC EXERCISES: CPT | Performed by: SPECIALIST/TECHNOLOGIST

## 2022-10-04 NOTE — FLOWSHEET NOTE
The [address] [facility] and [address] [facility] [address], [address] [place] [address]  Phone: [phone]   Fax:     [phone]          Physical Therapy Daily Treatment Note  Date:  10/4/2022    Patient Name:  Lisa Erickson    :  1980  MRN: [id number]  Restrictions/Precautions:    Medical/Treatment Diagnosis Information:  Diagnosis: Z01.818 (ICD-10-CM) - Pre-op testing  M16.12 (ICD-10-CM) - Primary osteoarthritis of left hip  Treatment Diagnosis: M25.552 Left hip pain  Insurance/Certification information:  PT Insurance Information: [facility]  Physician Information:   Adrienne Alba MD   Has the plan of care been signed (Y/N):        []  Yes  [x]  No     Date of Patient follow up with Physician: 22 surgery       Is this a Progress Report:     []  Yes  [x]  No        If Yes:  Date Range for reporting period:  Beginnin/23  Re-eval:   Ending    Progress report will be due (10 Rx or 30 days whichever is less): 06/10/5733      Recertification will be due (POC Duration  / 90 days whichever is less): 2022        Visit # Insurance Allowable Auth Required   2 [facility] 60 visits []  Yes [x]  No        Functional Scale: FOTO knee 57  Date assessed:     Get FOTO next time       Latex Allergy:  [x]NO      []YES  Preferred Language for Healthcare:   [x]English       []other:      Pain level:  3-4/10     SUBJECTIVE:  Pt. s/p YUSEF on LLE on 2022. More sore last couple days related to not using her crutch much and is walking lots here and there. Will walk her dogs  1/3 mile at time which is longer in duration but knows she limping. Will ice mostly at night.    OBJECTIVE:   Functional Testing  Sx Date 22 Prehab Date 22 Post-op Re-Eval Date 22 4 week F/U date  8 week F/U date  D/C Date       TUG (sec)        30 second sit to stand (reps)  16 - UE support on first 8       6 minute walk (m)           Balance:    Narrowed JANY (sec)  WNL      Semi Tandem (sec)  WNL      Tandem (sec)  WNL      SLS (sec)  10> BLE           Strength: MMT: hip flex, ER/IR     5/5        Knee flex/ext       5/5    Strength with dynamometer   Knee Ext R: 58.7  Knee Ext L: 42.6    Hip ABD R: 19.4  Hip ABD L: 18.6      Leg length R: 35in   L: 34in  Pt requested, states she has had leg length discrepancies her whole life - ASIS to medial malleolus     Gait: With crutch: WNL           Without crutch: increased trunk lean to the left, NBOS, slight inconsistent step length and decreased bam, increased pain and decreased stability without crutch     Sit <> stand transfer: good performance with B Ue's on chair, but when asked to perform without Ue's, pt with significant L genu valgus collapse and weight shift R   10/ 4 mild/ moderate antalgic gait entering clinic after sitting in chair. Other (workout restrictions):    10/4 Left hip incision closed and healing well, slight redness over prox. Incision but admits to performing daily. Moderate antalgia FWB w/ + trendelenberg w/ lateral sway    RESTRICTIONS/PRECAUTIONS: L hip OA, Ankylosing spondylitis, Crohn's disease    Exercises/Interventions:  39'  Therapeutic Exercise (49800) 25'  Resistance / level Sets/sec Reps Notes / Cues   Recum.  bike 4'   10/ 4   IB stretch  HSS   Hip flexor stretch off step Rt foot up  30\" 3xea 10/ 4   SB seated trunk flexion    To improve hip flexion for donning socks/shoes   Lateral band walks    NPV   Standing 3 way hip  2 10x 10/4   Hip hikes       Supine hip flexor Eob w/ strap stretching    Prone quad stretching   30\" 3x 10/ 4 manually   SB hip flexion 3'   10/4   Prone glute setting/ prone trunk extension on elbows   3' 5\" 10x 10/ 4                               Gait (71053)                                   Therapeutic Activities (19134)       Step ups   NPV    [employer] [id number] 8'   10/ 4 updated HEP w/ stretching focus and strengthening with           Neuromuscular Re-ed (98256)       Airex SLS                            Manual Intervention (43754)       Manual stretching supine hip flexor/ hamstrings/ prone quad stretching    15' 10/4                                        HEP:  Access Code: [address]: [email or web address]  Date: 09/29/2022  Prepared by: Felix Temple    Exercises  Seated Long Arc Quad - 1 x daily - 7 x weekly - 2 sets - 10 reps - 3 hold  Supine Bridge - 1 x daily - 7 x weekly - 2 sets - 10 reps - 2 hold  Supine Heel Slide - 1 x daily - 7 x weekly - 2 sets - 10 reps - 3 hold      Therapeutic Exercise and NMR EXR  [] (63828) Provided verbal/tactile cueing for activities related to strengthening, flexibility, endurance, ROM for improvements in LE, proximal hip, and core control with self care, mobility, lifting, ambulation.  [] (81345) Provided verbal/tactile cueing for activities related to improving balance, coordination, kinesthetic sense, posture, motor skill, proprioception  to assist with LE, proximal hip, and core control in self care, mobility, lifting, ambulation and eccentric single leg control.      NMR and Therapeutic Activities:    [x] (69155 or 56013) Provided verbal/tactile cueing for activities related to improving balance, coordination, kinesthetic sense, posture, motor skill, proprioception and motor activation to allow for proper function of core, proximal hip and LE with self care and ADLs  [] (73917) Gait Re-education- Provided training and instruction to the patient for proper LE, core and proximal hip recruitment and positioning and eccentric body weight control with ambulation re-education including up and down stairs     Home Exercise Program:    [x] (58139) Reviewed/Progressed HEP activities related to strengthening, flexibility, endurance, ROM of core, proximal hip and LE for functional self-care, mobility, lifting and ambulation/stair navigation   [] (88250)Reviewed/Progressed HEP activities related to improving balance, coordination, kinesthetic sense, posture, motor skill, proprioception of core, proximal hip and LE for self care, mobility, lifting, and ambulation/stair navigation      Manual Treatments:  PROM / STM / Oscillations-Mobs:  G-I, II, III, IV (PA's, Inf., Post.)  [x] (62250) Provided manual therapy to mobilize LE, proximal hip and/or LS spine soft tissue/joints for the purpose of modulating pain, promoting relaxation,  increasing ROM, reducing/eliminating soft tissue swelling/inflammation/restriction, improving soft tissue extensibility and allowing for proper ROM for normal function with self care, mobility, lifting and ambulation. Modalities:  declined     Charges:  Timed Code Treatment Minutes: 45   Total Treatment Minutes: 45       [] EVAL (LOW) 52900 (typically 20 minutes face-to-face)  [] EVAL (MOD) 00465 (typically 30 minutes face-to-face)  [] EVAL (HIGH) 53381 (typically 45 minutes face-to-face)  [] RE-EVAL     [x] CA(86154) x 2  [] IONTO  [] NMR (53512) x     [] VASO  [x] Manual (22457) x 1     [] Other:  [] TA x   [] Mech Traction (61885)  [] ES(attended) (38647)      [] ES (un) (98623):     GOALS:   Patient stated goal: Getting back to prior level of function pain free  [] Progressing: [] Met: [] Not Met: [] Adjusted     Therapist goals for Patient:   Long Term Goals: To be achieved in: 8 weeks  1. Independent in HEP and progression per patient tolerance, in order to prevent re-injury. [] Progressing: [] Met: [] Not Met: [] Adjusted   2. Patient will have a decrease in pain to facilitate improvement in movement, function, and ADLs as indicated by Functional Deficits. [] Progressing: [] Met: [] Not Met: [] Adjusted  3. Patient will score 70 or higher on FOTO hip assessment indicating subjective improvement prior to surgical intervention. [] Progressing: [] Met: [] Not Met: [] Adjusted  4.  Patient will demonstrate increased L LE flexibility with LE that is equal to R allowing for decrease muscle tension prior to surgical intervention. .    [] Progressing: [] Met: [] Not Met: [] Adjusted  4. Patient will be able to ambulate without device 500 feet without seated rest break with baseline mechanics . [] Progressing: [] Met: [] Not Met: [] Adjusted    Overall Progression Towards Functional goals/ Treatment Progress Update:  [] Patient is progressing as expected towards functional goals listed. [] Progression is slowed due to complexities/Impairments listed. [] Progression has been slowed due to co-morbidities. [x] Plan just implemented, too soon to assess goals progression <30days   [] Goals require adjustment due to lack of progress  [] Patient is not progressing as expected and requires additional follow up with physician  [] Other    Prognosis for POC: [x] Good [] Fair  [] Poor      Patient requires continued skilled intervention: [x] Yes  [] No    Assessment: Pt denied pain with program content and reported her left hip felt better after stretching anterior hip and she was advised to monitor duration of walking activities etc w/o using AD. Pt is anterior tight in her Left hip flexors and this was addressed with manual stretching to address all lower extremity muscle groups-- hip flexors/ adductors/ hamstrings. Pt denied pain with increased focus on stretching/  bike and standing hip abduction/ hip extension strengthening exercises. Pt plans to decrease walking amounts and increase focus on stretching etc.   Treatment/Activity Tolerance:  [x] Patient able to complete treatment  [] Patient limited by fatigue  [] Patient limited by pain     [] Patient limited by other medical complications  [] Other:   Patient Education:              8/23 Educated pt on objective findings and precautions with activity level and working out with  prior to surgery. Reviewed importance of assistive device after having surgery.  Educated on importance of stretches bilaterally to decrease tenderness/tightness and decrease irritation at bilateral hips prior to surgery. 9/29: precautions, increasing walking, HEP, ice vs heat      PLAN: Normalize gait with RTW scheduled 10/18/22. DN for RT hip hx/ YUSEF and scar tissue? [x] Continue per plan of care [] Alter current plan (see above)  [] Plan of care initiated [] Hold pending MD visit [] Discharge       Electronically signed by:  Cuba Diaz PTA, [id number]  TNote: If patient does not return for scheduled/ recommended follow up visits, this note will serve as a discharge from care along with most recent update on progress.

## 2022-10-06 ENCOUNTER — HOSPITAL ENCOUNTER (OUTPATIENT)
Dept: PHYSICAL THERAPY | Age: 42
Setting detail: THERAPIES SERIES
Discharge: HOME | End: 2022-10-06
Payer: COMMERCIAL

## 2022-10-06 PROCEDURE — 97112 NEUROMUSCULAR REEDUCATION: CPT

## 2022-10-06 PROCEDURE — 97110 THERAPEUTIC EXERCISES: CPT

## 2022-10-06 PROCEDURE — 97530 THERAPEUTIC ACTIVITIES: CPT

## 2022-10-06 NOTE — FLOWSHEET NOTE
The [facility] and [address] [place] [address], [address] [place] [address] 3360 [facility], [address] [place] [address]  Phone: [phone]   Fax:     [phone]          Physical Therapy Daily Treatment Note  Date:  10/6/2022    Patient Name:  Maddy Mahmood    :  1980  MRN: [id number]  Restrictions/Precautions:    Medical/Treatment Diagnosis Information:  Diagnosis: Z01.818 (ICD-10-CM) - Pre-op testing  M16.12 (ICD-10-CM) - Primary osteoarthritis of left hip  Treatment Diagnosis: M25.552 Left hip pain  Insurance/Certification information:  PT Insurance Information: [facility]  Physician Information:   Beverly Langston MD   Has the plan of care been signed (Y/N):        []  Yes  [x]  No     Date of Patient follow up with Physician: 22 surgery       Is this a Progress Report:     []  Yes  [x]  No        If Yes:  Date Range for reporting period:  Beginnin/23  Re-eval:   Ending    Progress report will be due (10 Rx or 30 days whichever is less):       Recertification will be due (POC Duration  / 90 days whichever is less): 2022        Visit # Insurance Allowable Auth Required   3 [facility] 60 visits []  Yes [x]  No        Functional Scale: FOTO knee 57  Date assessed:       FOTO Hip          Latex Allergy:  [x]NO      []YES  Preferred Language for Healthcare:   [x]English       []other:      Pain level:  3/10     SUBJECTIVE:  Pt. s/p YUSEF on LLE on 2022. Feels like she isn't as far along as she should be. Has a lot of ups and downs with pain, feels pretty good today.  Returns to work in 2 weeks     OBJECTIVE:   Functional Testing  Sx Date 22 Prehab Date 22 Post-op Re-Eval Date 22 4 week F/U date  8 week F/U date  D/C Date       TUG (sec)        30 second sit to stand (reps)  16 - UE support on first 8       6 minute walk (m)           Balance:    Narrowed JANY (sec)  WNL      Semi Tandem (sec)  WNL      Tandem (sec)  WNL      SLS (sec)  10> BLE Strength: MMT: hip flex, ER/IR     5/5        Knee flex/ext       5/5    Strength with dynamometer   Knee Ext R: 58.7  Knee Ext L: 42.6    Hip ABD R: 19.4  Hip ABD L: 18.6      Leg length R: 84.4cm   L: 84cm   Pt requested, states she has had leg length discrepancies her whole life - ASIS to medial malleolus     Gait: With crutch: WNL           Without crutch: increased trunk lean to the left, NBOS, slight inconsistent step length and decreased bam, increased pain and decreased stability without crutch     Sit <> stand transfer: good performance with B Ue's on chair, but when asked to perform without Ue's, pt with significant L genu valgus collapse and weight shift R   10/ 4 mild/ moderate antalgic gait entering clinic after sitting in chair. Other (workout restrictions):    10/4 Left hip incision closed and healing well, slight redness over prox. Incision but admits to performing daily. Moderate antalgia FWB w/ + trendelenberg w/ lateral sway    RESTRICTIONS/PRECAUTIONS: L hip OA, Ankylosing spondylitis, Crohn's disease    Exercises/Interventions:    Therapeutic Exercise (10901) Resistance / level Sets/sec Reps Notes / Cues   Recum.  bike 5'   10/ 6   IB stretch  HSS   Hip flexor stretch off step Rt foot up  10/ 4   SB seated trunk flexion    To improve hip flexion for donning socks/shoes   Lateral band walks    NPV - added to HEP with orange band    Standing 3 way hip  10/4   Hip hikes     Supine hip flexor Eob w/ strap stretching    Prone quad stretching  10/ 4 manually   SB hip flexion 10/4   Prone glute setting/ prone trunk extension on elbows 10/ 4   BKFO  1 10 HEP 10/6    Wobble board weight shifts   20 Added 10/6                 Gait (43951)                                   Therapeutic Activities (01594)       Step ups forward   Step ups lateral  Stair mobility     4\" and 6\" 2  2   10  10   No UE  No UE   No use of HR, good control, alternating step pattern with minimal pain    [facility] [id number] 8'   10/ 4 updated HEP w/ stretching focus and strengthening with           Neuromuscular Re-ed (67328)       Airex:   SLS  Tandem  Marches    2  1  1     20\" L  20\"  20 Added 10/6 Good balance          Biodex [place] L8  4' 10/6 added Minimal UE support , 923 pts                  Manual Intervention (35296)       Manual stretching supine hip flexor/ hamstrings/ prone quad stretching    5' 10/6                                        HEP:  Access Code: [email or web address]  URL: [email or web address]  Date: 10/06/2022  Prepared by: Marciano Dandy Humcheyanne    Exercises  Supine Bridge - 1 x daily - 7 x weekly - 3 sets - 10 reps  Supine Single Knee to Chest Stretch - 1 x daily - 7 x weekly - 3 sets - 10 reps  Supine Hamstring Stretch - 1 x daily - 7 x weekly - 3 sets - 10 reps  Modified Curtis Stretch - 1 x daily - 7 x weekly - 3 sets - 10 reps  Hooklying Single Knee to Chest Stretch - 1 x daily - 7 x weekly - 3 sets - 10 reps  Standing Hip Flexor Stretch - 1 x daily - 7 x weekly - 3 sets - 10 reps  Side Lunge Adductor Stretch - 1 x daily - 7 x weekly - 3 sets - 10 reps  Prone Quad Stretch with Towel Roll and Strap - 1 x daily - 7 x weekly - 3 sets - 10 reps  Bent Knee Fallouts - 1 x daily - 7 x weekly - 2 sets - 10 reps  Side Stepping with Resistance at Ankles - 1 x daily - 7 x weekly - 2 sets - 10 reps        Therapeutic Exercise and NMR EXR  [x] (51260) Provided verbal/tactile cueing for activities related to strengthening, flexibility, endurance, ROM for improvements in LE, proximal hip, and core control with self care, mobility, lifting, ambulation.  [] (28100) Provided verbal/tactile cueing for activities related to improving balance, coordination, kinesthetic sense, posture, motor skill, proprioception  to assist with LE, proximal hip, and core control in self care, mobility, lifting, ambulation and eccentric single leg control.      NMR and Therapeutic Activities:    [x] (45275 or 26078) Provided verbal/tactile cueing for activities related to improving balance, coordination, kinesthetic sense, posture, motor skill, proprioception and motor activation to allow for proper function of core, proximal hip and LE with self care and ADLs  [] (01039) Gait Re-education- Provided training and instruction to the patient for proper LE, core and proximal hip recruitment and positioning and eccentric body weight control with ambulation re-education including up and down stairs     Home Exercise Program:    [x] (51750) Reviewed/Progressed HEP activities related to strengthening, flexibility, endurance, ROM of core, proximal hip and LE for functional self-care, mobility, lifting and ambulation/stair navigation   [] (66346)Reviewed/Progressed HEP activities related to improving balance, coordination, kinesthetic sense, posture, motor skill, proprioception of core, proximal hip and LE for self care, mobility, lifting, and ambulation/stair navigation      Manual Treatments:  PROM / STM / Oscillations-Mobs:  G-I, II, III, IV (PA's, Inf., Post.)  [x] (88386) Provided manual therapy to mobilize LE, proximal hip and/or LS spine soft tissue/joints for the purpose of modulating pain, promoting relaxation,  increasing ROM, reducing/eliminating soft tissue swelling/inflammation/restriction, improving soft tissue extensibility and allowing for proper ROM for normal function with self care, mobility, lifting and ambulation.      Modalities:  declined     Charges:  Timed Code Treatment Minutes: 45   Total Treatment Minutes: 45       [] EVAL (LOW) 08443 (typically 20 minutes face-to-face)  [] EVAL (MOD) 60612 (typically 30 minutes face-to-face)  [] EVAL (HIGH) 14352 (typically 45 minutes face-to-face)  [] RE-EVAL     [x] VC(39156) x 1  [] IONTO  [x] NMR (14871) x  1   [] VASO  [] Manual (92927) x      [] Other:  [x] TA x 1   [] Mech Traction (81606)  [] ES(attended) (08688)      [] ES (un) (43013):     GOALS:   Patient stated goal: Getting back to prior level of function pain free  [] Progressing: [] Met: [] Not Met: [] Adjusted     Therapist goals for Patient:   Long Term Goals: To be achieved in: 8 weeks  1. Independent in HEP and progression per patient tolerance, in order to prevent re-injury. [] Progressing: [] Met: [] Not Met: [] Adjusted   2. Patient will have a decrease in pain to facilitate improvement in movement, function, and ADLs as indicated by Functional Deficits. [] Progressing: [] Met: [] Not Met: [] Adjusted  3. Patient will score 70 or higher on FOTO hip assessment indicating subjective improvement prior to surgical intervention. [] Progressing: [] Met: [] Not Met: [] Adjusted  4. Patient will demonstrate increased L LE flexibility with LE that is equal to R allowing for decrease muscle tension prior to surgical intervention. .    [] Progressing: [] Met: [] Not Met: [] Adjusted  4. Patient will be able to ambulate without device 500 feet without seated rest break with baseline mechanics . [] Progressing: [] Met: [] Not Met: [] Adjusted    Overall Progression Towards Functional goals/ Treatment Progress Update:  [] Patient is progressing as expected towards functional goals listed. [] Progression is slowed due to complexities/Impairments listed. [] Progression has been slowed due to co-morbidities. [x] Plan just implemented, too soon to assess goals progression <30days   [] Goals require adjustment due to lack of progress  [] Patient is not progressing as expected and requires additional follow up with physician  [] Other    Prognosis for POC: [x] Good [] Fair  [] Poor      Patient requires continued skilled intervention: [x] Yes  [] No    Assessment: Pt arrived without assistive device, improved gait mechanics without device compared to initial visit. Pt with improved pain throughout session and progressed well with exercises in bold above. Good static balance and weight bearing tolerance on LLE.  Pt with intermittent sharp pain in groin/Lhip when stepping/stairs, improves when focuses on body mechanics. Discussed and progressed HEP. Pt will continue to benefit from skilled therapy to improve strength and reduce pain to return to PLOF. Treatment/Activity Tolerance:  [x] Patient able to complete treatment  [] Patient limited by fatigue  [] Patient limited by pain     [] Patient limited by other medical complications  [] Other:   Patient Education:              8/23 Educated pt on objective findings and precautions with activity level and working out with  prior to surgery. Reviewed importance of assistive device after having surgery. Educated on importance of stretches bilaterally to decrease tenderness/tightness and decrease irritation at bilateral hips prior to surgery. 9/29: precautions, increasing walking, HEP, ice vs heat  10/6: Scar tissue management/prevention of stiffness, HEP    PLAN: Normalize gait with RTW scheduled 10/18/22. DN for RT hip hx/ YUSEF and scar tissue? [x] Continue per plan of care [] Alter current plan (see above)  [] Plan of care initiated [] Hold pending MD visit [] Discharge       Electronically signed by:  Erma Golden, PT, DPT OMT-C  Therapist was present, directed the patient's care, made skilled judgement, and was responsible for assessment and treatment of the patient. Rey Harmon, SPT   Note: If patient does not return for scheduled/ recommended follow up visits, this note will serve as a discharge from care along with most recent update on progress.

## 2022-10-11 ENCOUNTER — HOSPITAL ENCOUNTER (OUTPATIENT)
Dept: PHYSICAL THERAPY | Age: 42
Setting detail: THERAPIES SERIES
Discharge: HOME | End: 2022-10-11
Payer: COMMERCIAL

## 2022-10-11 PROCEDURE — 97140 MANUAL THERAPY 1/> REGIONS: CPT

## 2022-10-11 PROCEDURE — 97110 THERAPEUTIC EXERCISES: CPT

## 2022-10-11 PROCEDURE — 97530 THERAPEUTIC ACTIVITIES: CPT

## 2022-10-11 NOTE — FLOWSHEET NOTE
The [facility] and [facility], [address] [place] [address] Drive 3360 [facility], [address] [place] [address]  Phone: [phone]   Fax:     [phone]          Physical Therapy Daily Treatment Note  Date:  10/11/2022    Patient Name:  Tali Sue    :  1980  MRN: [id number]  Restrictions/Precautions:    Medical/Treatment Diagnosis Information:  Diagnosis: Z01.818 (ICD-10-CM) - Pre-op testing  M16.12 (ICD-10-CM) - Primary osteoarthritis of left hip  Treatment Diagnosis: M25.552 Left hip pain  Insurance/Certification information:  PT Insurance Information: [facility]  Physician Information:   Arsh Morgan MD   Has the plan of care been signed (Y/N):        []  Yes  [x]  No     Date of Patient follow up with Physician: 22 surgery       Is this a Progress Report:     []  Yes  [x]  No        If Yes:  Date Range for reporting period:  Beginnin/23  Re-eval:   Ending    Progress report will be due (10 Rx or 30 days whichever is less):       Recertification will be due (POC Duration  / 90 days whichever is less): 2022        Visit # Insurance Allowable Auth Required   4 [facility] 60 visits []  Yes [x]  No        Functional Scale: FOTO knee 57  Date assessed:       FOTO Hip          Latex Allergy:  [x]NO      []YES  Preferred Language for Healthcare:   [x]English       []other:      Pain level:  3/10     SUBJECTIVE:  Pt states she is having pain with walking and stair navigation that seems intermittent and unexplainable. Has large bulge at distal end of scar.      OBJECTIVE:   Functional Testing  Sx Date 22 Prehab Date 22 Post-op Re-Eval Date 22 4 week F/U date  8 week F/U date  D/C Date       TUG (sec)        30 second sit to stand (reps)  16 - UE support on first 8       6 minute walk (m)           Balance:    Narrowed JANY (sec)  WNL      Semi Tandem (sec)  WNL      Tandem (sec)  WNL      SLS (sec)  10> BLE           Strength: MMT: hip flex, ER/IR 5/5        Knee flex/ext       5/5    Strength with dynamometer   Knee Ext R: 58.7  Knee Ext L: 42.6    Hip ABD R: 19.4  Hip ABD L: 18.6      Leg length R: 84.4cm   L: 84cm   Pt requested, states she has had leg length discrepancies her whole life - ASIS to medial malleolus     Gait: With crutch: WNL           Without crutch: increased trunk lean to the left, NBOS, slight inconsistent step length and decreased bam, increased pain and decreased stability without crutch     Sit <> stand transfer: good performance with B Ue's on chair, but when asked to perform without Ue's, pt with significant L genu valgus collapse and weight shift R   10/ 4 mild/ moderate antalgic gait entering clinic after sitting in chair. Other (workout restrictions):    10/4 Left hip incision closed and healing well, slight redness over prox. Incision but admits to performing daily. Moderate antalgia FWB w/ + trendelenberg w/ lateral sway    RESTRICTIONS/PRECAUTIONS: L hip OA, Ankylosing spondylitis, Crohn's disease    Exercises/Interventions:    Therapeutic Exercise (80259) Resistance / level Sets/sec Reps Notes / Cues   Recum.  bike 5'   10/ 6   IB stretch  HSS   Hip flexor stretch off step Rt foot up  10/ 4   SB seated trunk flexion    To improve hip flexion for donning socks/shoes   Lateral band walks    NPV - added to HEP with orange band    Standing 3 way hip  10/4   Hip hikes     Supine hip flexor Eob w/ strap stretching    Prone quad stretching  10/ 4 manually   SB hip flexion 10/4   Prone glute setting/ prone trunk extension on elbows 10/ 4   BKFO  HEP 10/6    Wobble board weight shifts  Added 10/6   Clamshells sidelying lime 2 10    Leg Press #  SL 50# 2 15                                Gait (86801)                                   Therapeutic Activities (48545)       Step ups forward   Step ups lateral  Stair mobility     4\" 2  2   10  10   No UE  No UE   No use of HR, good control, alternating step pattern with minimal pain    [employer] [id number] 8'   10/ 4 updated HEP w/ stretching focus and strengthening with    Sit<>Stand to chair w/ AirEx Lime TB at distal thigh 2 10    Side stepping - TB Lime at thighs 20 feet X2 laps                  Neuromuscular Re-ed (25065)       Airex:   SLS  Tandem  Marches    2  1  1     20\" L  20\"  20 Added 10/6 Good balance          Biodex [place] 10/6 added Minimal UE support , 923 pts                  Manual Intervention (14655)       Manual stretching supine hip flexor/ hamstrings/ prone quad stretching    5' 10/6          IASTM to L hip scar HG 9 5'                            HEP:  Access Code: [email or web address]  URL: [email or web address]  Date: 10/06/2022  Prepared by: Ana Calzada Huml    Exercises  Supine Bridge - 1 x daily - 7 x weekly - 3 sets - 10 reps  Supine Single Knee to Chest Stretch - 1 x daily - 7 x weekly - 3 sets - 10 reps  Supine Hamstring Stretch - 1 x daily - 7 x weekly - 3 sets - 10 reps  Modified Curtis Stretch - 1 x daily - 7 x weekly - 3 sets - 10 reps  Hooklying Single Knee to Chest Stretch - 1 x daily - 7 x weekly - 3 sets - 10 reps  Standing Hip Flexor Stretch - 1 x daily - 7 x weekly - 3 sets - 10 reps  Side Lunge Adductor Stretch - 1 x daily - 7 x weekly - 3 sets - 10 reps  Prone Quad Stretch with Towel Roll and Strap - 1 x daily - 7 x weekly - 3 sets - 10 reps  Bent Knee Fallouts - 1 x daily - 7 x weekly - 2 sets - 10 reps  Side Stepping with Resistance at Ankles - 1 x daily - 7 x weekly - 2 sets - 10 reps        Therapeutic Exercise and NMR EXR  [x] (09222) Provided verbal/tactile cueing for activities related to strengthening, flexibility, endurance, ROM for improvements in LE, proximal hip, and core control with self care, mobility, lifting, ambulation.  [] (80247) Provided verbal/tactile cueing for activities related to improving balance, coordination, kinesthetic sense, posture, motor skill, proprioception  to assist with LE, proximal hip, and core control in self care, mobility, lifting, ambulation and eccentric single leg control. NMR and Therapeutic Activities:    [x] (23844 or 83266) Provided verbal/tactile cueing for activities related to improving balance, coordination, kinesthetic sense, posture, motor skill, proprioception and motor activation to allow for proper function of core, proximal hip and LE with self care and ADLs  [] (98363) Gait Re-education- Provided training and instruction to the patient for proper LE, core and proximal hip recruitment and positioning and eccentric body weight control with ambulation re-education including up and down stairs     Home Exercise Program:    [x] (89470) Reviewed/Progressed HEP activities related to strengthening, flexibility, endurance, ROM of core, proximal hip and LE for functional self-care, mobility, lifting and ambulation/stair navigation   [] (92601)Reviewed/Progressed HEP activities related to improving balance, coordination, kinesthetic sense, posture, motor skill, proprioception of core, proximal hip and LE for self care, mobility, lifting, and ambulation/stair navigation      Manual Treatments:  PROM / STM / Oscillations-Mobs:  G-I, II, III, IV (PA's, Inf., Post.)  [x] (14024) Provided manual therapy to mobilize LE, proximal hip and/or LS spine soft tissue/joints for the purpose of modulating pain, promoting relaxation,  increasing ROM, reducing/eliminating soft tissue swelling/inflammation/restriction, improving soft tissue extensibility and allowing for proper ROM for normal function with self care, mobility, lifting and ambulation.      Modalities:  declined     Charges:  Timed Code Treatment Minutes: 45   Total Treatment Minutes: 45       [] EVAL (LOW) 82327 (typically 20 minutes face-to-face)  [] EVAL (MOD) 62896 (typically 30 minutes face-to-face)  [] EVAL (HIGH) 07783 (typically 45 minutes face-to-face)  [] RE-EVAL     [x] CS(26190) x 1  [] IONTO  [] NMR (00333) x  1   [] VASO  [x] Manual (05972) x 1 [] Other:  [x] TA x 1   [] [facility] T[facility] (79681)  [] ES(attended) (51003)      [] ES (un) (55307):     GOALS:   Patient stated goal: Getting back to prior level of function pain free  [] Progressing: [] Met: [] Not Met: [] Adjusted     Therapist goals for Patient:   Long Term Goals: To be achieved in: 8 weeks  1. Independent in HEP and progression per patient tolerance, in order to prevent re-injury. [] Progressing: [] Met: [] Not Met: [] Adjusted   2. Patient will have a decrease in pain to facilitate improvement in movement, function, and ADLs as indicated by Functional Deficits. [] Progressing: [] Met: [] Not Met: [] Adjusted  3. Patient will score 70 or higher on FOTO hip assessment indicating subjective improvement prior to surgical intervention. [] Progressing: [] Met: [] Not Met: [] Adjusted  4. Patient will demonstrate increased L LE flexibility with LE that is equal to R allowing for decrease muscle tension prior to surgical intervention. .    [] Progressing: [] Met: [] Not Met: [] Adjusted  4. Patient will be able to ambulate without device 500 feet without seated rest break with baseline mechanics . [] Progressing: [] Met: [] Not Met: [] Adjusted    Overall Progression Towards Functional goals/ Treatment Progress Update:  [] Patient is progressing as expected towards functional goals listed. [] Progression is slowed due to complexities/Impairments listed. [] Progression has been slowed due to co-morbidities. [x] Plan just implemented, too soon to assess goals progression <30days   [] Goals require adjustment due to lack of progress  [] Patient is not progressing as expected and requires additional follow up with physician  [] Other    Prognosis for POC: [x] Good [] Fair  [] Poor      Patient requires continued skilled intervention: [x] Yes  [] No    Assessment: Pt with decreased pain throughout session and progressed well with exercises in bold above.  Only able to complete 4\" step up pain free without use of momentum to step up. Utilized hawk  in order to address the large mass underneath the L hip scar. Responds well. Discussed possible use of DN in R hip at future visits if wanted by evaluating PT and MD. Pt will continue to benefit from skilled therapy to improve strength and reduce pain to return to PLOF. Treatment/Activity Tolerance:  [x] Patient able to complete treatment  [] Patient limited by fatigue  [] Patient limited by pain     [] Patient limited by other medical complications  [] Other:   Patient Education:              8/23 Educated pt on objective findings and precautions with activity level and working out with  prior to surgery. Reviewed importance of assistive device after having surgery. Educated on importance of stretches bilaterally to decrease tenderness/tightness and decrease irritation at bilateral hips prior to surgery. 9/29: precautions, increasing walking, HEP, ice vs heat  10/6: Scar tissue management/prevention of stiffness, HEP    PLAN: Normalize gait with RTW scheduled 10/18/22. DN for RT hip hx/ YUSEF and scar tissue? [x] Continue per plan of care [] Alter current plan (see above)  [] Plan of care initiated [] Hold pending MD visit [] Discharge       Electronically signed by:  Natali Martinez, PT, DPT OMT-C  Note: If patient does not return for scheduled/ recommended follow up visits, this note will serve as a discharge from care along with most recent update on progress.

## 2022-10-13 ENCOUNTER — HOSPITAL ENCOUNTER (OUTPATIENT)
Dept: PHYSICAL THERAPY | Age: 42
Setting detail: THERAPIES SERIES
Discharge: HOME | End: 2022-10-13
Payer: COMMERCIAL

## 2022-10-13 ENCOUNTER — APPOINTMENT (OUTPATIENT)
Dept: PHYSICAL THERAPY | Age: 42
End: 2022-10-13
Payer: COMMERCIAL

## 2022-10-13 PROCEDURE — 97530 THERAPEUTIC ACTIVITIES: CPT | Performed by: SPECIALIST/TECHNOLOGIST

## 2022-10-13 PROCEDURE — 97140 MANUAL THERAPY 1/> REGIONS: CPT | Performed by: SPECIALIST/TECHNOLOGIST

## 2022-10-13 PROCEDURE — 97110 THERAPEUTIC EXERCISES: CPT | Performed by: SPECIALIST/TECHNOLOGIST

## 2022-10-13 NOTE — FLOWSHEET NOTE
[address] [facility],            Physical Therapy Daily Treatment Note  Date:  10/13/2022    Patient Name:  Niall Gamboa    :  1980  MRN: [id number]  Restrictions/Precautions:    Medical/Treatment Diagnosis Information:  Diagnosis: Z01.818 (ICD-10-CM) - Pre-op testing  M16.12 (ICD-10-CM) - Primary osteoarthritis of left hip  Treatment Diagnosis: M25.552 Left hip pain  Insurance/Certification information:  PT Insurance Information: [facility]  Physician Information:   Alberto Pritchett MD   Has the plan of care been signed (Y/N):        []  Yes  [x]  No     Date of Patient follow up with Physician: 22 surgery       Is this a Progress Report:     []  Yes  [x]  No        If Yes:  Date Range for reporting period:  Beginnin/23  Re-eval:   Ending    Progress report will be due (10 Rx or 30 days whichever is less):       Recertification will be due (POC Duration  / 90 days whichever is less): 2022        Visit # Insurance Allowable Auth Required   4 [facility] 60 visits []  Yes [x]  No        Functional Scale: FOTO knee 57  Date assessed:       FOTO Hip          Latex Allergy:  [x]NO      []YES  Preferred Language for Healthcare:   [x]English       []other:      Pain level:  0/10 Stiffness more than pain    SUBJECTIVE: 4 + weeKs s/p  Pt reports that her left hip is still stiff but has been moving furniture and been active painting yesterday etc. Pt is tired from walking and being active and knows she limping coming out of chairs etc.  First couple steps are nervous for her with not knowing how the left hip is going to respond.      OBJECTIVE:   Functional Testing  Sx Date 22 Prehab Date 22 Post-op Re-Eval Date 22 4 week F/U date  8 week F/U date  D/C Date       TUG (sec)        30 second sit to stand (reps)  16 - UE support on first 8       6 minute walk (m)           Balance:    Narrowed JANY (sec)  WNL      Semi Tandem (sec)  WNL Tandem (sec)  WNL      SLS (sec)  10> BLE           Strength: MMT: hip flex, ER/IR     5/5        Knee flex/ext       5/5    Strength with dynamometer   Knee Ext R: 58.7  Knee Ext L: 42.6    Hip ABD R: 19.4  Hip ABD L: 18.6      Leg length R: 84.4cm   L: 84cm   Pt requested, states she has had leg length discrepancies her whole life - ASIS to medial malleolus     Gait: With crutch: WNL           Without crutch: increased trunk lean to the left, NBOS, slight inconsistent step length and decreased bam, increased pain and decreased stability without crutch     Sit <> stand transfer: good performance with B Ue's on chair, but when asked to perform without Ue's, pt with significant L genu valgus collapse and weight shift R   10/ 4 mild/ moderate antalgic gait entering clinic after sitting in chair. Other (workout restrictions):    10/4 Left hip incision closed and healing well, slight redness over prox. Incision but admits to performing daily. Moderate antalgia FWB w/ + trendelenberg w/ lateral sway    RESTRICTIONS/PRECAUTIONS: L hip OA, Ankylosing spondylitis, Crohn's disease    Exercises/Interventions:  39'  Therapeutic Exercise (15924)36' Resistance / level Sets/sec Reps Notes / Cues   Recum. Bike 5'   10/ 13   IB stretch  HSS   Hip flexor stretch off step Rt foot up  10/ 13   SB seated trunk flexion    To improve hip flexion for donning socks/shoes   Lateral band walks blue 2 10x NPV - added to HEP with orange band    Standing 3 way hip  10/4   Hip hikes     Supine hip flexor EOB w/ strap stretching  or deborah position EOB    Prone quad stretching  30\" 3x 10/ 11 manually   SB hip flexion 10/4   Prone glute setting/ prone trunk extension on elbows 10/ 4   BKFO  HEP 10/6    Wobble board weight shifts  Added 10/6   Clamshells sidelying Blue 2 10 10/13   Leg Press #  SL 55# 2 15x 10/13   Supine slr flex on RT    NPV Left ?  HEP   10/13  Rt quad weakness    1/2 bfly stretch with left leg into ER   15-20\" 3x 10/13   W/ slight forward posture. Wallsits  NPV ? Gait (56826)                                   Therapeutic Activities (42280) 8'       Step ups forward   Step ups lateral  Stair mobility     4\" 2  2   10  10   No UE  No UE   No use of HR, good control, alternating step pattern with minimal pain    Scooter [id number]              MED     [id number] 8'   10/ 4 updated HEP w/ stretching focus and strengthening with   10/13   Sit<>Stand to chair w/ AirEx Lime TB at distal thigh 2 10     TRX minisquats   1 15x 10/13                 Neuromuscular Re-ed (15514)8'       Airex:   SLS   AIREX NPV  Tandem  Marches    20\"  1  1     3x L  20\"  20 Added 10/13 Good balance W/ LEFT KNEE FLEX          Biodex [place] 10/6 added Minimal UE support , 923 pts                  Manual Intervention (78302)  12'       Manual stretching supine hip flexor/ hamstrings/ prone quad stretching   5'  10/13          IASTM to L hip scar HG 9 5'                            HEP:  Access Code: [email or web address]  URL: [email or web address]  Date: 10/06/2022  Prepared by: Hermann Temple    Exercises  Supine Bridge - 1 x daily - 7 x weekly - 3 sets - 10 reps  Supine Single Knee to Chest Stretch - 1 x daily - 7 x weekly - 3 sets - 10 reps  Supine Hamstring Stretch - 1 x daily - 7 x weekly - 3 sets - 10 reps  Modified Curtis Stretch - 1 x daily - 7 x weekly - 3 sets - 10 reps  Hooklying Single Knee to Chest Stretch - 1 x daily - 7 x weekly - 3 sets - 10 reps  Standing Hip Flexor Stretch - 1 x daily - 7 x weekly - 3 sets - 10 reps  Side Lunge Adductor Stretch - 1 x daily - 7 x weekly - 3 sets - 10 reps  Prone Quad Stretch with Towel Roll and Strap - 1 x daily - 7 x weekly - 3 sets - 10 reps  Bent Knee Fallouts - 1 x daily - 7 x weekly - 2 sets - 10 reps  Side Stepping with Resistance at Ankles - 1 x daily - 7 x weekly - 2 sets - 10 reps        Therapeutic Exercise and NMR EXR  [x] (46984) Provided verbal/tactile cueing for activities related to strengthening, flexibility, endurance, ROM for improvements in LE, proximal hip, and core control with self care, mobility, lifting, ambulation.  [] (27355) Provided verbal/tactile cueing for activities related to improving balance, coordination, kinesthetic sense, posture, motor skill, proprioception  to assist with LE, proximal hip, and core control in self care, mobility, lifting, ambulation and eccentric single leg control. NMR and Therapeutic Activities:    [x] (65875 or 68492) Provided verbal/tactile cueing for activities related to improving balance, coordination, kinesthetic sense, posture, motor skill, proprioception and motor activation to allow for proper function of core, proximal hip and LE with self care and ADLs  [] (64273) Gait Re-education- Provided training and instruction to the patient for proper LE, core and proximal hip recruitment and positioning and eccentric body weight control with ambulation re-education including up and down stairs     Home Exercise Program:    [x] (41567) Reviewed/Progressed HEP activities related to strengthening, flexibility, endurance, ROM of core, proximal hip and LE for functional self-care, mobility, lifting and ambulation/stair navigation   [] (01901)Reviewed/Progressed HEP activities related to improving balance, coordination, kinesthetic sense, posture, motor skill, proprioception of core, proximal hip and LE for self care, mobility, lifting, and ambulation/stair navigation      Manual Treatments:  PROM / STM / Oscillations-Mobs:  G-I, II, III, IV (PA's, Inf., Post.)  [x] (28796) Provided manual therapy to mobilize LE, proximal hip and/or LS spine soft tissue/joints for the purpose of modulating pain, promoting relaxation,  increasing ROM, reducing/eliminating soft tissue swelling/inflammation/restriction, improving soft tissue extensibility and allowing for proper ROM for normal function with self care, mobility, lifting and ambulation.      Modalities: declined     Charges:  Timed Code Treatment Minutes: 45   Total Treatment Minutes: 45       [] EVAL (LOW) 93361 (typically 20 minutes face-to-face)  [] EVAL (MOD) 91132 (typically 30 minutes face-to-face)  [] EVAL (HIGH) 31613 (typically 45 minutes face-to-face)  [] RE-EVAL     [x] DN(54203) x 1  [] IONTO  [] NMR (13247) x     [] VASO  [x] Manual (24114) x 1      [] Other:  [x] TA x 1   [] Mech Traction (38600)  [] ES(attended) (19040)      [] ES (un) (20098):     GOALS:   Patient stated goal: Getting back to prior level of function pain free  [] Progressing: [] Met: [] Not Met: [] Adjusted     Therapist goals for Patient:   Long Term Goals: To be achieved in: 8 weeks  1. Independent in HEP and progression per patient tolerance, in order to prevent re-injury. [] Progressing: [] Met: [] Not Met: [] Adjusted   2. Patient will have a decrease in pain to facilitate improvement in movement, function, and ADLs as indicated by Functional Deficits. [] Progressing: [] Met: [] Not Met: [] Adjusted  3. Patient will score 70 or higher on FOTO hip assessment indicating subjective improvement prior to surgical intervention. [] Progressing: [] Met: [] Not Met: [] Adjusted  4. Patient will demonstrate increased L LE flexibility with LE that is equal to R allowing for decrease muscle tension prior to surgical intervention. .    [] Progressing: [] Met: [] Not Met: [] Adjusted  4. Patient will be able to ambulate without device 500 feet without seated rest break with baseline mechanics . [] Progressing: [] Met: [] Not Met: [] Adjusted    Overall Progression Towards Functional goals/ Treatment Progress Update:  [] Patient is progressing as expected towards functional goals listed. [] Progression is slowed due to complexities/Impairments listed. [] Progression has been slowed due to co-morbidities.   [x] Plan just implemented, too soon to assess goals progression <30days   [] Goals require adjustment due to lack of progress  [] Patient is not progressing as expected and requires additional follow up with physician  [] Other    Prognosis for POC: [x] Good [] Fair  [] Poor      Patient requires continued skilled intervention: [x] Yes  [] No    Assessment: Pt with decreased pain throughout session and progressed well with exercises in bold above. Utilized hawk  in order to address the large mass underneath the L hip scar. Responds well and felt better related to manual stretching today to hips flexors/ hamstrings and advised ok to begin performing ER to help with adductor stretching. Increased focus on stretching and manual mobility related to being busy and muscles being tighter. Has bilateral quad weakness that needs to be addressed. Discussed possible use of DN in R hip at future visits if wanted by evaluating PT and MD. Pt will continue to benefit from skilled therapy to improve strength and reduce pain to return to PLOF. Treatment/Activity Tolerance:  [x] Patient able to complete treatment  [] Patient limited by fatigue  [] Patient limited by pain     [] Patient limited by other medical complications  [] Other:   Patient Education:              8/23 Educated pt on objective findings and precautions with activity level and working out with  prior to surgery. Reviewed importance of assistive device after having surgery. Educated on importance of stretches bilaterally to decrease tenderness/tightness and decrease irritation at bilateral hips prior to surgery. 9/29: precautions, increasing walking, HEP, ice vs heat  10/6: Scar tissue management/prevention of stiffness, HEP    PLAN: Normalize gait with RTW scheduled 10/18/22. DN for RT hip hx/ YUSEF and scar tissue?   [x] Continue per plan of care [] Alter current plan (see above)  [] Plan of care initiated [] Hold pending MD visit [] Discharge       Electronically signed by:  Ana Cristina Dover PTA, [id number]  Note: If patient does not return for scheduled/ recommended follow up visits, this note will serve as a discharge from care along with most recent update on progress.

## 2022-10-18 ENCOUNTER — HOSPITAL ENCOUNTER (OUTPATIENT)
Dept: PHYSICAL THERAPY | Age: 42
Setting detail: THERAPIES SERIES
Discharge: HOME | End: 2022-10-18
Payer: COMMERCIAL

## 2022-10-18 PROCEDURE — 97530 THERAPEUTIC ACTIVITIES: CPT

## 2022-10-18 PROCEDURE — 97110 THERAPEUTIC EXERCISES: CPT

## 2022-10-18 PROCEDURE — 97112 NEUROMUSCULAR REEDUCATION: CPT

## 2022-10-18 NOTE — FLOWSHEET NOTE
Functional Testing  Sx Date 9/7/22 Prehab Date 8/23/22 Post-op Re-Eval Date 9/29/22 4 week F/U date  8 week F/U date  D/C Date       TUG (sec)        30 second sit to stand (reps)  16 - UE support on first 8       6 minute walk (m)           Balance:    Narrowed JANY (sec)  WNL      Semi Tandem (sec)  WNL      Tandem (sec)  WNL      SLS (sec)  10> BLE           Strength: MMT: hip flex, ER/IR     5/5        Knee flex/ext       5/5    Strength with dynamometer   Knee Ext R: 58.7  Knee Ext L: 42.6    Hip ABD R: 19.4  Hip ABD L: 18.6      Leg length R: 84.4cm   L: 84cm   Pt requested, states she has had leg length discrepancies her whole life - ASIS to medial malleolus     Gait: With crutch: WNL           Without crutch: increased trunk lean to the left, NBOS, slight inconsistent step length and decreased bam, increased pain and decreased stability without crutch     Sit <> stand transfer: good performance with B Ue's on chair, but when asked to perform without Ue's, pt with significant L genu valgus collapse and weight shift R   10/ 4 mild/ moderate antalgic gait entering clinic after sitting in chair. Other (workout restrictions):    10/4 Left hip incision closed and healing well, slight redness over prox. Incision but admits to performing daily. Moderate antalgia FWB w/ + trendelenberg w/ lateral sway    RESTRICTIONS/PRECAUTIONS: L hip OA YUSFE on 9/7/2022 anterior approach, Ankylosing spondylitis, Crohn's disease    Exercises/Interventions:  39'  Therapeutic Exercise (09037)29' Resistance / level Sets/sec Reps Notes / Cues   Recum.  Bike 5'   10/ 13   IB stretch  HSS   Hip flexor stretch off step Rt foot up  10/ 13   SB seated trunk flexion    To improve hip flexion for donning socks/shoes   Lateral band walks 2 10x NPV - added to HEP with orange band    Standing 3 way hip  10/4   Hip hikes     Supine hip flexor EOB w/ strap stretching  or deborah position EOB    Prone quad stretching  10/ 11 manually   SB hip flexion 10/4   Prone glute setting/ prone trunk extension on elbows 10/ 4   BKFO  HEP 10/6    Wobble board weight shifts  Added 10/6   Clamshells sidelying Blue 2 10 10/13   Reverse clamshells  2 10 10/18   Sidelying hip abd  2 10 10/18   Leg Press #  #  SL 70# 1  2  2 15x  15  15 10/13   Supine slr flex on RT    NPV Left ? HEP   10/13  Rt quad weakness    1/2 bfly stretch with left leg into ER   15-20\" 3x 10/13   W/ slight forward posture. Wallsits  NPV ? BRIDGE SL/March   NPV                                Gait (80548)                                   Therapeutic Activities (56226) 8'       Step ups forward   Step ups lateral  Stair mobility     4\" 2  2   10  10   No UE  No UE   No use of HR, good control, alternating step pattern with minimal pain    Arrogene [id number]              MED     [id number] 8'   10/ 4 updated HEP w/ stretching focus and strengthening with   10/13   Sit<>Stand to chair w/ AirEx Lime TB at distal thigh 2 10     TRX minisquats   1 15x 10/13                 Neuromuscular Re-ed (97703)8'       Airex:   SLS   AIREX NPV  Tandem  Marches    20\"  1  1     3x L  20\"  20 Added 10/13 Good balance W/ LEFT KNEE FLEX          Biodex [place] 10/6 added Minimal UE support , 923 pts    Hip hinge review/good mornings/RDL's NPV                                  Manual Intervention (96147)  12'       Manual stretching supine hip flexor/ hamstrings/ prone quad stretching   5'  10/13          IASTM to L hip scar HG 9 5'                              Pt education:   10/18: Extensive discussion with pt on current presentation as well as limitations, tightness and concerns. Discussed at length progress and presentation with lacking appropriate hip flexor and glute engagement with associated movement errors with excessive hip compensation that may be contributing to her symptoms. Reviewed technique with HEP and instructed as appropriate to maximize posterior hip activation.  X 15 mins    HEP:  Access Code: [email or web address]  URL: [email or web address]  Date: 10/06/2022  Prepared by: Joslyn Nicholas Huml    Exercises  Supine Bridge - 1 x daily - 7 x weekly - 3 sets - 10 reps  Supine Single Knee to Chest Stretch - 1 x daily - 7 x weekly - 3 sets - 10 reps  Supine Hamstring Stretch - 1 x daily - 7 x weekly - 3 sets - 10 reps  Modified Curtis Stretch - 1 x daily - 7 x weekly - 3 sets - 10 reps  Hooklying Single Knee to Chest Stretch - 1 x daily - 7 x weekly - 3 sets - 10 reps  Standing Hip Flexor Stretch - 1 x daily - 7 x weekly - 3 sets - 10 reps  Side Lunge Adductor Stretch - 1 x daily - 7 x weekly - 3 sets - 10 reps  Prone Quad Stretch with Towel Roll and Strap - 1 x daily - 7 x weekly - 3 sets - 10 reps  Bent Knee Fallouts - 1 x daily - 7 x weekly - 2 sets - 10 reps  Side Stepping with Resistance at Ankles - 1 x daily - 7 x weekly - 2 sets - 10 reps        Therapeutic Exercise and NMR EXR  [x] (27213) Provided verbal/tactile cueing for activities related to strengthening, flexibility, endurance, ROM for improvements in LE, proximal hip, and core control with self care, mobility, lifting, ambulation.  [] (21798) Provided verbal/tactile cueing for activities related to improving balance, coordination, kinesthetic sense, posture, motor skill, proprioception  to assist with LE, proximal hip, and core control in self care, mobility, lifting, ambulation and eccentric single leg control.      NMR and Therapeutic Activities:    [x] (03096 or 52970) Provided verbal/tactile cueing for activities related to improving balance, coordination, kinesthetic sense, posture, motor skill, proprioception and motor activation to allow for proper function of core, proximal hip and LE with self care and ADLs  [] (96463) Gait Re-education- Provided training and instruction to the patient for proper LE, core and proximal hip recruitment and positioning and eccentric body weight control with ambulation re-education including up and down stairs     Home Exercise Program:    [x] (91185) Reviewed/Progressed HEP activities related to strengthening, flexibility, endurance, ROM of core, proximal hip and LE for functional self-care, mobility, lifting and ambulation/stair navigation   [] (66066)Reviewed/Progressed HEP activities related to improving balance, coordination, kinesthetic sense, posture, motor skill, proprioception of core, proximal hip and LE for self care, mobility, lifting, and ambulation/stair navigation      Manual Treatments:  PROM / STM / Oscillations-Mobs:  G-I, II, III, IV (PA's, Inf., Post.)  [x] (33945) Provided manual therapy to mobilize LE, proximal hip and/or LS spine soft tissue/joints for the purpose of modulating pain, promoting relaxation,  increasing ROM, reducing/eliminating soft tissue swelling/inflammation/restriction, improving soft tissue extensibility and allowing for proper ROM for normal function with self care, mobility, lifting and ambulation. Modalities:  declined     Charges:  Timed Code Treatment Minutes: 45   Total Treatment Minutes: 45       [] EVAL (LOW) 75972 (typically 20 minutes face-to-face)  [] EVAL (MOD) 96967 (typically 30 minutes face-to-face)  [] EVAL (HIGH) 42801 (typically 45 minutes face-to-face)  [] RE-EVAL     [x] AM(74010) x 1  [] IONTO  [] NMR (61179) x     [] VASO  [] Manual (57596) x 1      [] Other:  [x] TA x 2   [] Mech Traction (86987)  [] ES(attended) (38391)      [] ES (un) (22569):     GOALS:   Patient stated goal: Getting back to prior level of function pain free  [] Progressing: [] Met: [] Not Met: [] Adjusted     Therapist goals for Patient:   Long Term Goals: To be achieved in: 8 weeks  1. Independent in HEP and progression per patient tolerance, in order to prevent re-injury. [] Progressing: [] Met: [] Not Met: [] Adjusted   2. Patient will have a decrease in pain to facilitate improvement in movement, function, and ADLs as indicated by Functional Deficits.     [] Progressing: [] Met: [] Not Met: [] Adjusted  3. Patient will score 70 or higher on FOTO hip assessment indicating subjective improvement prior to surgical intervention. [] Progressing: [] Met: [] Not Met: [] Adjusted  4. Patient will demonstrate increased L LE flexibility with LE that is equal to R allowing for decrease muscle tension prior to surgical intervention. .    [] Progressing: [] Met: [] Not Met: [] Adjusted  4. Patient will be able to ambulate without device 500 feet without seated rest break with baseline mechanics . [] Progressing: [] Met: [] Not Met: [] Adjusted    Overall Progression Towards Functional goals/ Treatment Progress Update:  [] Patient is progressing as expected towards functional goals listed. [] Progression is slowed due to complexities/Impairments listed. [] Progression has been slowed due to co-morbidities. [x] Plan just implemented, too soon to assess goals progression <30days   [] Goals require adjustment due to lack of progress  [] Patient is not progressing as expected and requires additional follow up with physician  [] Other    Prognosis for POC: [x] Good [] Fair  [] Poor      Patient requires continued skilled intervention: [x] Yes  [] No    Assessment: Increased time taken today with pt education and HEP review as well as with technique. Pt noted to increase posterior PVT with hip flexor interventions which was bigger focus on pt education as well as to minimize ROM and maximize muscle engagement without compensation. Pt very concerned with her symptoms relative to medical literature which needed to be addressed today that typical research is focused on geriatric populations which are targert audience for research rather than younger patients. Pt was reassured that over use injuries are typical as younger patients will have higher demands and expectations and less patients with recovery and pt was progressing appropriately.  Will continue to work to maximize hip ROM, strength and muscle engagement. Treatment/Activity Tolerance:  [x] Patient able to complete treatment  [] Patient limited by fatigue  [] Patient limited by pain     [] Patient limited by other medical complications  [] Other:     Patient Education:              8/23 Educated pt on objective findings and precautions with activity level and working out with  prior to surgery. Reviewed importance of assistive device after having surgery. Educated on importance of stretches bilaterally to decrease tenderness/tightness and decrease irritation at bilateral hips prior to surgery. 9/29: precautions, increasing walking, HEP, ice vs heat  10/6: Scar tissue management/prevention of stiffness, HEP    PLAN: Normalize gait with RTW scheduled 10/18/22. DN for RT hip hx/ YUSEF and scar tissue? [x] Continue per plan of care [] Alter current plan (see above)  [] Plan of care initiated [] Hold pending MD visit [] Discharge       Electronically signed by:  Kelsey Willis, PT, DPT, OCS, OMT-C  Note: If patient does not return for scheduled/ recommended follow up visits, this note will serve as a discharge from care along with most recent update on progress.

## 2022-10-20 ENCOUNTER — HOSPITAL ENCOUNTER (OUTPATIENT)
Dept: PHYSICAL THERAPY | Age: 42
Setting detail: THERAPIES SERIES
Discharge: HOME | End: 2022-10-20
Payer: COMMERCIAL

## 2022-10-20 PROCEDURE — 97110 THERAPEUTIC EXERCISES: CPT

## 2022-10-20 PROCEDURE — 97112 NEUROMUSCULAR REEDUCATION: CPT

## 2022-10-20 PROCEDURE — 97140 MANUAL THERAPY 1/> REGIONS: CPT

## 2022-10-20 NOTE — FLOWSHEET NOTE
[id number] [facility],            Physical Therapy Daily Treatment Note  Date:  10/20/2022    Patient Name:  Dulce Edgar    :  1980  MRN: [id number]  Restrictions/Precautions:    Medical/Treatment Diagnosis Information:  Diagnosis: Z01.818 (ICD-10-CM) - Pre-op testing  M16.12 (ICD-10-CM) - Primary osteoarthritis of left hip  Treatment Diagnosis: M25.552 Left hip pain  Insurance/Certification information:  PT Insurance Information: [facility]  Physician Information:   Nohemy Cabezas MD   Has the plan of care been signed (Y/N):        []  Yes  [x]  No     Date of Patient follow up with Physician: 22 surgery       Is this a Progress Report:     []  Yes  [x]  No        If Yes:  Date Range for reporting period:  Beginnin/23  Re-eval:   Ending    Progress report will be due (10 Rx or 30 days whichever is less):       Recertification will be due (POC Duration  / 90 days whichever is less): 2022        Visit # Insurance Allowable Auth Required   5 [facility] 60 visits []  Yes [x]  No        Functional Scale: FOTO knee 57  Date assessed:       FOTO Hip          Latex Allergy:  [x]NO      []YES  Preferred Language for Healthcare:   [x]English       []other:      Pain level:  0/10 Stiffness more than pain    SUBJECTIVE: Pt reports she is doing ok, more stiff and sore today in the front of the hip/front of the thigh.  Overall pain is not bad, still inconsistent anterior hip pain with transitional movements the most consistent but does not last.     OBJECTIVE:   Functional Testing  Sx Date 22 Prehab Date 22 Post-op Re-Eval Date 22 4 week F/U date  8 week F/U date  D/C Date       TUG (sec)        30 second sit to stand (reps)  16 - UE support on first 8       6 minute walk (m)           Balance:    Narrowed JANY (sec)  WNL      Semi Tandem (sec)  WNL      Tandem (sec)  WNL      SLS (sec)  10> BLE           Strength: MMT: hip flex, ER/IR 5/5        Knee flex/ext       5/5    Strength with dynamometer   Knee Ext R: 58.7  Knee Ext L: 42.6    Hip ABD R: 19.4  Hip ABD L: 18.6      Leg length R: 84.4cm   L: 84cm   Pt requested, states she has had leg length discrepancies her whole life - ASIS to medial malleolus     Gait: With crutch: WNL           Without crutch: increased trunk lean to the left, NBOS, slight inconsistent step length and decreased bam, increased pain and decreased stability without crutch     Sit <> stand transfer: good performance with B Ue's on chair, but when asked to perform without Ue's, pt with significant L genu valgus collapse and weight shift R   10/ 4 mild/ moderate antalgic gait entering clinic after sitting in chair. Other (workout restrictions):    10/4 Left hip incision closed and healing well, slight redness over prox. Incision but admits to performing daily. Moderate antalgia FWB w/ + trendelenberg w/ lateral sway    RESTRICTIONS/PRECAUTIONS: L hip OA YUSEF on 9/7/2022 anterior approach, Ankylosing spondylitis, Crohn's disease    Exercises/Interventions:  39'  Therapeutic Exercise (34046)73' Resistance / level Sets/sec Reps Notes / Cues   Recum.  Bike 5'   10/ 13   IB stretch  HSS   Hip flexor stretch off step Rt foot up  10/ 13   SB seated trunk flexion    Lateral band walks Standing 3 way hip  10/4   Hip hikes     Supine hip flexor EOB w/ strap stretching  or deborah position EOB    Prone quad stretching  10/ 11 manually   SB hip flexion 10/4   Prone glute setting/ prone trunk extension on elbows 10/ 4   BKFO  HEP 10/6    Wobble board weight shifts  Added 10/6   Clamshells sidelying Reverse clamshells Sidelying hip abd Leg Press #  #  SL 70# 1  2  2 15x  15  15 10/13          BRIDGE SL/March   NPV    Standing march with band on toes lime 2 10 10/20 cues to avoid lumbar flexion/posterior PVT   Long sitting L hip lift and raise over 1/2 foam roll  2 10 10/20                 Gait (48591) Therapeutic Activities (41280) 8'       Step ups forward   Step ups lateral  Stair mobility Medbridge [id number]              MED     [id number] Sit<>Stand to chair w/ AirEx  TRX minisquats                Neuromuscular Re-ed (55464)9'       Airex:   SLS   AIREX NPV  Tandem  [address] 10/6 added Minimal UE support , 923 pts    Hip hinge with stick  1 8 10/20   Good mornings  1 10 10/20                        Manual Intervention (73595)  12'       Manual stretching supine hip flexor/ hamstrings/ prone quad stretching   5'  10/13   STM/release of TFL/RF with Accupressure and hip movement into ABD+ER and ADD+IR   8 mins    IASTM to L hip scar                              Pt education:   10/18: Extensive discussion with pt on current presentation as well as limitations, tightness and concerns. Discussed at length progress and presentation with lacking appropriate hip flexor and glute engagement with associated movement errors with excessive hip compensation that may be contributing to her symptoms. Reviewed technique with HEP and instructed as appropriate to maximize posterior hip activation. X 15 mins    HEP:  Access Code: [email or web address]  URL: [email or web address]  Date: 10/21/2022  Prepared by: Megan Alva    Exercises  Supine Bridge - 1 x daily - 7 x weekly - 3 sets - 10 reps  Supine Single Knee to Chest Stretch - 1 x daily - 7 x weekly - 3 sets - 10 reps  Supine Hamstring Stretch - 1 x daily - 7 x weekly - 3 sets - 10 reps  Standing Hip Flexor Stretch - 1 x daily - 7 x weekly - 3 sets - 10 reps  Side Lunge Adductor Stretch - 1 x daily - 7 x weekly - 3 sets - 10 reps  Prone Quad Stretch with Towel Roll and Strap - 1 x daily - 7 x weekly - 3 sets - 10 reps  Bent Knee Fallouts - 1 x daily - 7 x weekly - 2 sets - 10 reps  Side Stepping with Resistance at Ankles - 1 x daily - 7 x weekly - 2 sets - 10 reps  Standing Good Morning with Barbell - 1 x daily - 7 x weekly - 3 sets - 10 reps      Access Code: [email or web address]  URL: [email or web address]  Date: 10/06/2022  Prepared by: Karlee Del Toro Huml    Exercises  Supine Bridge - 1 x daily - 7 x weekly - 3 sets - 10 reps  Supine Single Knee to Chest Stretch - 1 x daily - 7 x weekly - 3 sets - 10 reps  Supine Hamstring Stretch - 1 x daily - 7 x weekly - 3 sets - 10 reps  Modified Curtis Stretch - 1 x daily - 7 x weekly - 3 sets - 10 reps  Hooklying Single Knee to Chest Stretch - 1 x daily - 7 x weekly - 3 sets - 10 reps  Standing Hip Flexor Stretch - 1 x daily - 7 x weekly - 3 sets - 10 reps  Side Lunge Adductor Stretch - 1 x daily - 7 x weekly - 3 sets - 10 reps  Prone Quad Stretch with Towel Roll and Strap - 1 x daily - 7 x weekly - 3 sets - 10 reps  Bent Knee Fallouts - 1 x daily - 7 x weekly - 2 sets - 10 reps  Side Stepping with Resistance at Ankles - 1 x daily - 7 x weekly - 2 sets - 10 reps        Therapeutic Exercise and NMR EXR  [x] (77278) Provided verbal/tactile cueing for activities related to strengthening, flexibility, endurance, ROM for improvements in LE, proximal hip, and core control with self care, mobility, lifting, ambulation.  [] (31391) Provided verbal/tactile cueing for activities related to improving balance, coordination, kinesthetic sense, posture, motor skill, proprioception  to assist with LE, proximal hip, and core control in self care, mobility, lifting, ambulation and eccentric single leg control.      NMR and Therapeutic Activities:    [x] (45962 or 63221) Provided verbal/tactile cueing for activities related to improving balance, coordination, kinesthetic sense, posture, motor skill, proprioception and motor activation to allow for proper function of core, proximal hip and LE with self care and ADLs  [] (50017) Gait Re-education- Provided training and instruction to the patient for proper LE, core and proximal hip recruitment and positioning and eccentric body weight control with ambulation re-education including up and down stairs     Home Exercise Program:    [] (01126) Reviewed/Progressed HEP activities related to strengthening, flexibility, endurance, ROM of core, proximal hip and LE for functional self-care, mobility, lifting and ambulation/stair navigation   [] (68378)Reviewed/Progressed HEP activities related to improving balance, coordination, kinesthetic sense, posture, motor skill, proprioception of core, proximal hip and LE for self care, mobility, lifting, and ambulation/stair navigation      Manual Treatments:  PROM / STM / Oscillations-Mobs:  G-I, II, III, IV (PA's, Inf., Post.)  [x] (38169) Provided manual therapy to mobilize LE, proximal hip and/or LS spine soft tissue/joints for the purpose of modulating pain, promoting relaxation,  increasing ROM, reducing/eliminating soft tissue swelling/inflammation/restriction, improving soft tissue extensibility and allowing for proper ROM for normal function with self care, mobility, lifting and ambulation. Modalities:  declined     Charges:  Timed Code Treatment Minutes: 45   Total Treatment Minutes: 45       [] EVAL (LOW) 93024 (typically 20 minutes face-to-face)  [] EVAL (MOD) 59778 (typically 30 minutes face-to-face)  [] EVAL (HIGH) 52016 (typically 45 minutes face-to-face)  [] RE-EVAL     [x] AJ(37649) x 1  [] IONTO  [x] NMR (45036) x 1    [] VASO  [x] Manual (89055) x 1     [] Other:  [] TA x    [] Mech Traction (49964)  [] ES(attended) (82802)     [] ES (un) (58826):     GOALS:   Patient stated goal: Getting back to prior level of function pain free  [] Progressing: [] Met: [] Not Met: [] Adjusted     Therapist goals for Patient:   Long Term Goals: To be achieved in: 8 weeks  1. Independent in HEP and progression per patient tolerance, in order to prevent re-injury. [] Progressing: [] Met: [] Not Met: [] Adjusted   2. Patient will have a decrease in pain to facilitate improvement in movement, function, and ADLs as indicated by Functional Deficits.     [] Progressing: [] Met: [] Not Met: [] Adjusted  3. Patient will score 70 or higher on FOTO hip assessment indicating subjective improvement prior to surgical intervention. [] Progressing: [] Met: [] Not Met: [] Adjusted  4. Patient will demonstrate increased L LE flexibility with LE that is equal to R allowing for decrease muscle tension prior to surgical intervention. .    [] Progressing: [] Met: [] Not Met: [] Adjusted  4. Patient will be able to ambulate without device 500 feet without seated rest break with baseline mechanics . [] Progressing: [] Met: [] Not Met: [] Adjusted    Overall Progression Towards Functional goals/ Treatment Progress Update:  [] Patient is progressing as expected towards functional goals listed. [] Progression is slowed due to complexities/Impairments listed. [] Progression has been slowed due to co-morbidities. [x] Plan just implemented, too soon to assess goals progression <30days   [] Goals require adjustment due to lack of progress  [] Patient is not progressing as expected and requires additional follow up with physician  [] Other    Prognosis for POC: [x] Good [] Fair  [] Poor      Patient requires continued skilled intervention: [x] Yes  [] No    Assessment: Pt continues with significant anterior hip soft tissue restrictions and tenderness. Modifications today with manual techniques to work specifically to address TFL and RF proximal attachments with Accupressure and movement as noted. Pt did tolerate these techniques well. F/u to work to improve general hip range and mobility with hip hinge and Good Morning interventions working through ranges to address posterior change engagement to facilitate general hip motion throughout range without lumbar spine compensation.  Directly after therex to address hip flexion with resistance within available range in standing to help to facilitate lumbar control which pt was able to demonstrate interventions as noted with compensation last 2-3 reps in lumbar spine indicative of hip flexor fatigue. Overall pt tolerating session well, compensations today were minimal and pt walking with less antalgia post session. Treatment/Activity Tolerance:  [x] Patient able to complete treatment  [] Patient limited by fatigue  [] Patient limited by pain     [] Patient limited by other medical complications  [] Other:     Patient Education:              8/23 Educated pt on objective findings and precautions with activity level and working out with  prior to surgery. Reviewed importance of assistive device after having surgery. Educated on importance of stretches bilaterally to decrease tenderness/tightness and decrease irritation at bilateral hips prior to surgery. 9/29: precautions, increasing walking, HEP, ice vs heat  10/6: Scar tissue management/prevention of stiffness, HEP    PLAN: Normalize gait with RTW scheduled 10/18/22. DN for RT hip hx/ YUSEF and scar tissue? [x] Continue per plan of care [] Alter current plan (see above)  [] Plan of care initiated [] Hold pending MD visit [] Discharge       Electronically signed by:  Sherlyn Parra, PT, DPT, OCS, OMT-C  Note: If patient does not return for scheduled/ recommended follow up visits, this note will serve as a discharge from care along with most recent update on progress.

## 2022-10-25 ENCOUNTER — APPOINTMENT (OUTPATIENT)
Dept: PHYSICAL THERAPY | Age: 42
End: 2022-10-25
Payer: COMMERCIAL

## 2022-10-27 ENCOUNTER — HOSPITAL ENCOUNTER (OUTPATIENT)
Dept: PHYSICAL THERAPY | Age: 42
Setting detail: THERAPIES SERIES
Discharge: HOME | End: 2022-10-27
Payer: COMMERCIAL

## 2022-10-27 PROCEDURE — 97112 NEUROMUSCULAR REEDUCATION: CPT

## 2022-10-27 PROCEDURE — 97110 THERAPEUTIC EXERCISES: CPT

## 2022-10-27 NOTE — PROGRESS NOTES
[id number] [facility],            Physical Therapy Daily Treatment Note  Date:  10/27/2022    Patient Name:  Oralia Mcknight    :  1980  MRN: [id number]  Restrictions/Precautions:    Medical/Treatment Diagnosis Information:  Diagnosis: Z01.818 (ICD-10-CM) - Pre-op testing  M16.12 (ICD-10-CM) - Primary osteoarthritis of left hip  Treatment Diagnosis: M25.552 Left hip pain  Insurance/Certification information:  PT Insurance Information: [facility]  Physician Information:   Arjun Ramey MD   Has the plan of care been signed (Y/N):        [x]  Yes  []  No     Date of Patient follow up with Physician: 22 surgery       Is this a Progress Report:     [x]  Yes  []  No        If Yes:  Date Range for reporting period:  Beginnin/23  Re-eval:   PN: 10/27  Ending    Progress report will be due (10 Rx or 30 days whichever is less):       Recertification will be due (POC Duration  / 90 days whichever is less): 2022        Visit # Insurance Allowable Auth Required   6 [facility] 60 visits []  Yes [x]  No        Functional Scale: FOTO knee 57  Date assessed:       FOTO Hip          Latex Allergy:  [x]NO      []YES  Preferred Language for Healthcare:   [x]English       []other:      Pain level:  0/10 Stiffness more than pain    SUBJECTIVE: Pt reports she is doing okay, intermittent pain with hip flexion stating popping. Pt says work has been irritating to her hip due to increased standing and walking.      OBJECTIVE:    (re-eval):  Strength with dynamometer:   Knee Ext R: 58.7  Knee Ext L: 42.6  Hip ABD R: 19.4  Hip ABD L: 18.6    Leg length R: 84.4cm   L: 84cm   Pt requested, states she has had leg length discrepancies her whole life - ASIS to medial malleolus     Gait: With crutch: WNL           Without crutch: increased trunk lean to the left, NBOS, slight inconsistent step length and decreased bam, increased pain and decreased stability without crutch     Sit <> stand transfer: good performance with B Ue's on chair, but when asked to perform without Ue's, pt with significant L genu valgus collapse and weight shift R   10/ 4 mild/ moderate antalgic gait entering clinic after sitting in chair. Other (workout restrictions):    10/4: Left hip incision closed and healing well, slight redness over prox. Incision but admits to performing daily. Moderate antalgia FWB w/ + trendelenberg w/ lateral sway      10/27 (PN):  Strength with dynamometer:  Knee Ext R: 64.7  Knee Ext L: 65.7  Hip flex R: 35.6  Hip flex L: 23.3  Hip ABD R: 22.4  Hip ABD L: 19.8    RESTRICTIONS/PRECAUTIONS: L hip OA YUSEF on 9/7/2022 anterior approach, Ankylosing spondylitis, Crohn's disease    Exercises/Interventions:    Therapeutic Exercise (19815)67' Resistance / level Sets/sec Reps Notes / Cues   Recum.  Bike 5'   10/ 27   IB stretch  HSS   Hip flexor stretch off step Rt foot up  10/ 13   SB seated trunk flexion    Lateral band walks Standing 3 way hip  10/4   Hip hikes with foam roller on wall   20 10/27   Supine hip flexor EOB w/ strap stretching  or deborah position EOB    Prone quad stretching  10/ 11 manually   SB hip flexion 10/4   Prone glute setting/ prone trunk extension on elbows 10/ 4   BKFO  HEP 10/6    Wobble board weight shifts  Added 10/6   Clamshells sidelying Reverse clamshells Sidelying hip abd Leg Press 10/13          BRIDGE SL  Bridge March  1  2 10  10 10/27  10/27   Hamstring curl and bridge on SB   2 10 10/27   Standing march with band on toes lime 2 10 B 10/27    Long sitting L hip lift and raise over 1/2 foam roll  PN completed  10'  10/27   [facility] in future                                          Gait (95295)                                   Therapeutic Activities (80488)       Step ups forward   Step ups lateral  Stair mobility Scooter [id number]              MED     [id number] Sit<>Stand to chair w/ AirEx  TRX minisquats                Neuromuscular Re-ed (62090)       Airex:   SLS   AIREX NPV  Tandem  [address] 10/6 added Minimal UE support , 923 pts    Hip hinge with stick  1 8 10/27   Good mornings  1 10 10/27   Tall kneeling hip hinge                     Manual Intervention (30295)  12'       Manual stretching supine hip flexor/ hamstrings/ prone quad stretching  10/13   STM/release of TFL/RF with Accupressure and hip movement into ABD+ER and ADD+IR    IASTM to L hip scar                            Per Dr. Troy Aly,  \"I think in her case, we should hold off on dry-needling near her hip flexor. I think she has an anatomic reason for why she has tendonitis. I think it has to do with acetabular cup position. If and when she heals her left hip, my next objective was to talk to her about possible socket revision. First I would likely aspirate the hip to confirm no infection. We can try probably some noninvasive modalities to address her hip flexor in the meantime. However, I would stay away from needling so far. \"    Pt education:   10/18: Extensive discussion with pt on current presentation as well as limitations, tightness and concerns. Discussed at length progress and presentation with lacking appropriate hip flexor and glute engagement with associated movement errors with excessive hip compensation that may be contributing to her symptoms. Reviewed technique with HEP and instructed as appropriate to maximize posterior hip activation. X 15 mins    HEP:  Access Code: [email or web address]  URL: [email or web address]  Date: 10/21/2022  Prepared by: Marleen Quintanilla    Exercises  Supine Bridge - 1 x daily - 7 x weekly - 3 sets - 10 reps  Supine Single Knee to Chest Stretch - 1 x daily - 7 x weekly - 3 sets - 10 reps  Supine Hamstring Stretch - 1 x daily - 7 x weekly - 3 sets - 10 reps  Standing Hip Flexor Stretch - 1 x daily - 7 x weekly - 3 sets - 10 reps  Side Lunge Adductor Stretch - 1 x daily - 7 x weekly - 3 sets - 10 reps  Prone Quad Stretch with function of core, proximal hip and LE with self care and ADLs  [] (32406) Gait Re-education- Provided training and instruction to the patient for proper LE, core and proximal hip recruitment and positioning and eccentric body weight control with ambulation re-education including up and down stairs     Home Exercise Program:    [] (50745) Reviewed/Progressed HEP activities related to strengthening, flexibility, endurance, ROM of core, proximal hip and LE for functional self-care, mobility, lifting and ambulation/stair navigation   [] (53223)Reviewed/Progressed HEP activities related to improving balance, coordination, kinesthetic sense, posture, motor skill, proprioception of core, proximal hip and LE for self care, mobility, lifting, and ambulation/stair navigation      Manual Treatments:  PROM / STM / Oscillations-Mobs:  G-I, II, III, IV (PA's, Inf., Post.)  [x] (46392) Provided manual therapy to mobilize LE, proximal hip and/or LS spine soft tissue/joints for the purpose of modulating pain, promoting relaxation,  increasing ROM, reducing/eliminating soft tissue swelling/inflammation/restriction, improving soft tissue extensibility and allowing for proper ROM for normal function with self care, mobility, lifting and ambulation. Modalities:  declined     Charges:  Timed Code Treatment Minutes: 45   Total Treatment Minutes: 45       [] EVAL (LOW) 64226 (typically 20 minutes face-to-face)  [] EVAL (MOD) 38536 (typically 30 minutes face-to-face)  [] EVAL (HIGH) 53441 (typically 45 minutes face-to-face)  [] RE-EVAL     [x] UI(55543) x 2  [] IONTO  [x] NMR (41962) x 1    [] VASO  [] Manual (61774) x 1     [] Other:  [] TA x    [] Mech Traction (16798)  [] ES(attended) (68002)     [] ES (un) (97014):     GOALS:   Patient stated goal: Getting back to prior level of function pain free  [x] Progressing: [] Met: [] Not Met: [] Adjusted     Therapist goals for Patient:   Long Term Goals: To be achieved in: 8 weeks  1. Independent in HEP and progression per patient tolerance, in order to prevent re-injury. [] Progressing: [x] Met: [] Not Met: [] Adjusted   2. Patient will have a decrease in pain to facilitate improvement in movement, function, and ADLs as indicated by Functional Deficits. [x] Progressing: [] Met: [] Not Met: [] Adjusted  3. Patient will score 70 or higher on FOTO hip assessment indicating subjective improvement prior to surgical intervention. [x] Progressing: [] Met: [] Not Met: [] Adjusted  4. Patient will demonstrate increased L LE flexibility with LE that is equal to R allowing for decrease muscle tension prior to surgical intervention. .    [x] Progressing: [] Met: [] Not Met: [] Adjusted  5. Patient will be able to ambulate without device 500 feet without seated rest break with baseline mechanics . [] Progressing: [x] Met: [] Not Met: [] Adjusted  6. Patient will demonstrate seated hip flexion strength to > 33# to allow for dec difficulty performing stairs. Added 10/27  [] Progressing: [] Met: [] Not Met: [] Adjusted    Overall Progression Towards Functional goals/ Treatment Progress Update:  [] Patient is progressing as expected towards functional goals listed. [x] Progression is slowed due to complexities/Impairments listed. [] Progression has been slowed due to co-morbidities. [] Plan just implemented, too soon to assess goals progression <30days   [] Goals require adjustment due to lack of progress  [] Patient is not progressing as expected and requires additional follow up with physician  [] Other    Prognosis for POC: [x] Good [] Fair  [] Poor      Patient requires continued skilled intervention: [x] Yes  [] No    Assessment: Progress note completed. Pt with improvement of hip hinge exercises improving form and ROM. Pt with improving strength with dynamometer per above. Continue to progress strengthening exercises with increased load.  Has good core control with DEE vega bridge, and bridge on swiss ball with hamstring curls. One goal added to address persisting hip flexion weakness. Continue PT to progress strength and ROM to return to PLOF pain free. Treatment/Activity Tolerance:  [x] Patient able to complete treatment  [] Patient limited by fatigue  [] Patient limited by pain     [] Patient limited by other medical complications  [] Other:     Patient Education:              8/23 Educated pt on objective findings and precautions with activity level and working out with  prior to surgery. Reviewed importance of assistive device after having surgery. Educated on importance of stretches bilaterally to decrease tenderness/tightness and decrease irritation at bilateral hips prior to surgery. 9/29: precautions, increasing walking, HEP, ice vs heat  10/6: Scar tissue management/prevention of stiffness, HEP      PLAN: Normalize gait with RTW scheduled 10/18/22. [x] Continue per plan of care [] Alter current plan (see above)  [] Plan of care initiated [] Hold pending MD visit [] Discharge       Electronically signed by:  Samina Garcia, PT, DPT, OCS, OMT-C  Therapist was present, directed the patient's care, made skilled judgement, and was responsible for assessment and treatment of the patient. Bhavana Ojeda, SPT   Note: If patient does not return for scheduled/ recommended follow up visits, this note will serve as a discharge from care along with most recent update on progress.

## 2022-10-31 ENCOUNTER — HOSPITAL ENCOUNTER (OUTPATIENT)
Dept: PHYSICAL THERAPY | Age: 42
Setting detail: THERAPIES SERIES
Discharge: HOME | End: 2022-10-31
Payer: COMMERCIAL

## 2022-10-31 PROCEDURE — 97140 MANUAL THERAPY 1/> REGIONS: CPT

## 2022-10-31 PROCEDURE — 97110 THERAPEUTIC EXERCISES: CPT

## 2022-10-31 PROCEDURE — 97112 NEUROMUSCULAR REEDUCATION: CPT

## 2022-10-31 NOTE — FLOWSHEET NOTE
[address] [facility],            Physical Therapy Daily Treatment Note  Date:  10/31/2022    Patient Name:  Stephani Duncan    :  1980  MRN: [id number]  Restrictions/Precautions:    Medical/Treatment Diagnosis Information:  Diagnosis: Z01.818 (ICD-10-CM) - Pre-op testing  M16.12 (ICD-10-CM) - Primary osteoarthritis of left hip  Treatment Diagnosis: M25.552 Left hip pain  Insurance/Certification information:  PT Insurance Information: [facility]  Physician Information:   Ryan Willis MD   Has the plan of care been signed (Y/N):        [x]  Yes  []  No     Date of Patient follow up with Physician: 22 surgery       Is this a Progress Report:     [x]  Yes  []  No        If Yes:  Date Range for reporting period:  Beginnin/23  Re-eval:   PN: 10/27  Ending    Progress report will be due (10 Rx or 30 days whichever is less):       Recertification will be due (POC Duration  / 90 days whichever is less): 2022        Visit # Insurance Allowable Auth Required   6 [facility] 60 visits []  Yes [x]  No        Functional Scale: FOTO knee 57  Date assessed:       FOTO Hip          Latex Allergy:  [x]NO      []YES  Preferred Language for Healthcare:   [x]English       []other:      Pain level:  0/10 Stiffness more than pain    SUBJECTIVE:   Pt was using a leaf blower and vacuum 6-8 hours over two day period. Pt had sharp, spasm type pain in L lateral hip, still posterior to greater trochanter, but she doesn't feel it was piriformis.     OBJECTIVE:    (re-eval):  Strength with dynamometer:   Knee Ext R: 58.7  Knee Ext L: 42.6  Hip ABD R: 19.4  Hip ABD L: 18.6    Leg length R: 84.4cm   L: 84cm   Pt requested, states she has had leg length discrepancies her whole life - ASIS to medial malleolus     Gait: With crutch: WNL           Without crutch: increased trunk lean to the left, NBOS, slight inconsistent step length and decreased bam, increased pain and decreased stability without crutch     Sit <> stand transfer: good performance with B Ue's on chair, but when asked to perform without Ue's, pt with significant L genu valgus collapse and weight shift R   10/ 4 mild/ moderate antalgic gait entering clinic after sitting in chair. Other (workout restrictions):    10/4: Left hip incision closed and healing well, slight redness over prox. Incision but admits to performing daily. Moderate antalgia FWB w/ + trendelenberg w/ lateral sway      10/27 (PN):  Strength with dynamometer:  Knee Ext R: 64.7  Knee Ext L: 65.7  Hip flex R: 35.6  Hip flex L: 23.3  Hip ABD R: 22.4  Hip ABD L: 19.8    10/31 gait training:  Walking speed: 1.21 m/sec  Step cycle: .96 cycles/sec  Avg step length: L 63 cm, R 59 cm  Coefficient of variation: 4% B  Time on each foot: L 52, R 48    RESTRICTIONS/PRECAUTIONS: L hip OA YUSEF on 9/7/2022 anterior approach, Ankylosing spondylitis, Crohn's disease    Exercises/Interventions:    Therapeutic Exercise (90381)59' Resistance / level Sets/sec Reps Notes / Cues   Recum.  Bike  Treadmill  5'   10/ 27   IB stretch  HSS   Hip flexor stretch off step Rt foot up  10/ 13   SB seated trunk flexion    Lateral band walks Standing 3 way hip  10/4   Hip hikes with foam roller on wall   10/27   Supine hip flexor EOB w/ strap stretching  or deborah position EOB    Prone quad stretching  10/ 11 manually   SB hip flexion 10/4            Wobble board weight shifts  Added 10/6   Clamshells sidelying Reverse clamshells Sidelying hip abd Leg Press          Leg Extension   Leg hamstring curl  , 170, 190#    SL 80#    75#  55# 3      2    3  3 10      15 B    10  10 10/31          BRIDGE SL  Bridge March  10/27  10/27   Hamstring curl and bridge on SB   10/27   Standing march with band on toes lime 10/27    Long sitting L hip lift and raise over 1/2 foam roll  2 10 10/31          [facility]                                          Gait (52663) Therapeutic Activities (19233)       Step ups forward   Step ups lateral  Stair mobility Medbridge [id number]              MED     [id number] Sit<>Stand to chair w/ AirEx  TRX minisquats                Neuromuscular Re-ed (15369)       Airex:   SLS   AIREX NPV  Tandem  Marches        Biodex Marland 10/6 added Minimal UE support , 923 pts    Hip hinge with stick  10/27   Good mornings  10/27   Tall kneeling hip hinge       Planks on toes and forearms  Side planks on forearm and knees  3  2 20\"  15\" B 10/31 with QS           Manual Intervention (74690)        Manual stretching supine hip flexor/ hamstrings/ prone quad stretching  5' 10/31   STM/release of TFL/RF with Accupressure and hip movement into ABD+ER and ADD+IR 5'     IASTM to L hip scar                            Per Dr. Beth Mendoza,  \"I think in her case, we should hold off on dry-needling near her hip flexor. I think she has an anatomic reason for why she has tendonitis. I think it has to do with acetabular cup position. If and when she heals her left hip, my next objective was to talk to her about possible socket revision. First I would likely aspirate the hip to confirm no infection. We can try probably some noninvasive modalities to address her hip flexor in the meantime. However, I would stay away from needling so far. \"    Pt education:   10/18: Extensive discussion with pt on current presentation as well as limitations, tightness and concerns. Discussed at length progress and presentation with lacking appropriate hip flexor and glute engagement with associated movement errors with excessive hip compensation that may be contributing to her symptoms. Reviewed technique with HEP and instructed as appropriate to maximize posterior hip activation. X 15 mins    HEP:  Access Code: [email or web address]  URL: "Hey, Neighbor!". [email or web address]/  Date: 10/21/2022  Prepared by: Marcela Villarreal    Exercises  Supine Bridge - 1 x daily - 7 x weekly - 3 sets - 10 reps  Supine Single Knee to Chest Stretch - 1 x daily - 7 x weekly - 3 sets - 10 reps  Supine Hamstring Stretch - 1 x daily - 7 x weekly - 3 sets - 10 reps  Standing Hip Flexor Stretch - 1 x daily - 7 x weekly - 3 sets - 10 reps  Side Lunge Adductor Stretch - 1 x daily - 7 x weekly - 3 sets - 10 reps  Prone Quad Stretch with Towel Roll and Strap - 1 x daily - 7 x weekly - 3 sets - 10 reps  Bent Knee Fallouts - 1 x daily - 7 x weekly - 2 sets - 10 reps  Side Stepping with Resistance at Ankles - 1 x daily - 7 x weekly - 2 sets - 10 reps  Standing Good Morning with Barbell - 1 x daily - 7 x weekly - 3 sets - 10 reps      Access Code: [email or web address]  URL: [email or web address]  Date: 10/06/2022  Prepared by: Angi Temple    Exercises  Supine Bridge - 1 x daily - 7 x weekly - 3 sets - 10 reps  Supine Single Knee to Chest Stretch - 1 x daily - 7 x weekly - 3 sets - 10 reps  Supine Hamstring Stretch - 1 x daily - 7 x weekly - 3 sets - 10 reps  Modified Curtis Stretch - 1 x daily - 7 x weekly - 3 sets - 10 reps  Hooklying Single Knee to Chest Stretch - 1 x daily - 7 x weekly - 3 sets - 10 reps  Standing Hip Flexor Stretch - 1 x daily - 7 x weekly - 3 sets - 10 reps  Side Lunge Adductor Stretch - 1 x daily - 7 x weekly - 3 sets - 10 reps  Prone Quad Stretch with Towel Roll and Strap - 1 x daily - 7 x weekly - 3 sets - 10 reps  Bent Knee Fallouts - 1 x daily - 7 x weekly - 2 sets - 10 reps  Side Stepping with Resistance at Ankles - 1 x daily - 7 x weekly - 2 sets - 10 reps        Therapeutic Exercise and NMR EXR  [x] (66371) Provided verbal/tactile cueing for activities related to strengthening, flexibility, endurance, ROM for improvements in LE, proximal hip, and core control with self care, mobility, lifting, ambulation.  [] (24614) Provided verbal/tactile cueing for activities related to improving balance, coordination, kinesthetic sense, posture, motor skill, proprioception  to assist with LE, proximal hip, and core control in self care, mobility, lifting, ambulation and eccentric single leg control. NMR and Therapeutic Activities:    [x] (32913 or 39693) Provided verbal/tactile cueing for activities related to improving balance, coordination, kinesthetic sense, posture, motor skill, proprioception and motor activation to allow for proper function of core, proximal hip and LE with self care and ADLs  [] (09877) Gait Re-education- Provided training and instruction to the patient for proper LE, core and proximal hip recruitment and positioning and eccentric body weight control with ambulation re-education including up and down stairs     Home Exercise Program:    [] (87686) Reviewed/Progressed HEP activities related to strengthening, flexibility, endurance, ROM of core, proximal hip and LE for functional self-care, mobility, lifting and ambulation/stair navigation   [] (32307)Reviewed/Progressed HEP activities related to improving balance, coordination, kinesthetic sense, posture, motor skill, proprioception of core, proximal hip and LE for self care, mobility, lifting, and ambulation/stair navigation      Manual Treatments:  PROM / STM / Oscillations-Mobs:  G-I, II, III, IV (PA's, Inf., Post.)  [x] (99676) Provided manual therapy to mobilize LE, proximal hip and/or LS spine soft tissue/joints for the purpose of modulating pain, promoting relaxation,  increasing ROM, reducing/eliminating soft tissue swelling/inflammation/restriction, improving soft tissue extensibility and allowing for proper ROM for normal function with self care, mobility, lifting and ambulation.      Modalities:  declined     Charges:  Timed Code Treatment Minutes: 45   Total Treatment Minutes: 45       [] EVAL (LOW) 15386 (typically 20 minutes face-to-face)  [] EVAL (MOD) 15319 (typically 30 minutes face-to-face)  [] EVAL (HIGH) 27498 (typically 45 minutes face-to-face)  [] RE-EVAL   [x] MS(33348) x 1  [] IONTO  [x] NMR (97068) x 1    [] VASO  [x] Manual (01994) x 1     [] Other:  [] TA x    [] [facility] (52954)  [] ES(attended) (53778)     [] ES (un) (25278):     GOALS:   Patient stated goal: Getting back to prior level of function pain free  [x] Progressing: [] Met: [] Not Met: [] Adjusted     Therapist goals for Patient:   Long Term Goals: To be achieved in: 8 weeks  1. Independent in HEP and progression per patient tolerance, in order to prevent re-injury. [] Progressing: [x] Met: [] Not Met: [] Adjusted   2. Patient will have a decrease in pain to facilitate improvement in movement, function, and ADLs as indicated by Functional Deficits. [x] Progressing: [] Met: [] Not Met: [] Adjusted  3. Patient will score 70 or higher on FOTO hip assessment indicating subjective improvement prior to surgical intervention. [x] Progressing: [] Met: [] Not Met: [] Adjusted  4. Patient will demonstrate increased L LE flexibility with LE that is equal to R allowing for decrease muscle tension prior to surgical intervention. .    [x] Progressing: [] Met: [] Not Met: [] Adjusted  5. Patient will be able to ambulate without device 500 feet without seated rest break with baseline mechanics . [] Progressing: [x] Met: [] Not Met: [] Adjusted  6. Patient will demonstrate seated hip flexion strength to > 33# to allow for dec difficulty performing stairs. Added 10/27  [] Progressing: [] Met: [] Not Met: [] Adjusted    Overall Progression Towards Functional goals/ Treatment Progress Update:  [] Patient is progressing as expected towards functional goals listed. [x] Progression is slowed due to complexities/Impairments listed. [] Progression has been slowed due to co-morbidities.   [] Plan just implemented, too soon to assess goals progression <30days   [] Goals require adjustment due to lack of progress  [] Patient is not progressing as expected and requires additional follow up with physician  [] Other    Prognosis for POC: [x] Good [] Fair  [] Poor      Patient requires continued skilled intervention: [x] Yes  [] No    Assessment: Patient progressed well with increasing weight on leg press, leg ext, and hamstring curls with appropriate mm response. Pt gait analysis on treadmill completed, see objective above, all WNL. She is improving strength and functional mobility and continues to progress towards goals. Continue PT to progress strength and ROM to return to PLOF pain free. Treatment/Activity Tolerance:  [x] Patient able to complete treatment  [] Patient limited by fatigue  [] Patient limited by pain     [] Patient limited by other medical complications  [] Other:     Patient Education:              8/23 Educated pt on objective findings and precautions with activity level and working out with  prior to surgery. Reviewed importance of assistive device after having surgery. Educated on importance of stretches bilaterally to decrease tenderness/tightness and decrease irritation at bilateral hips prior to surgery. 9/29: precautions, increasing walking, HEP, ice vs heat  10/6: Scar tissue management/prevention of stiffness, HEP      PLAN:   [x] Continue per plan of care [] Alter current plan (see above)  [] Plan of care initiated [] Hold pending MD visit [] Discharge       Electronically signed by:  Estefany Luis, PT, DPT, OMT-C  Therapist was present, directed the patient's care, made skilled judgement, and was responsible for assessment and treatment of the patient. Tavo Olivas, SPT   Note: If patient does not return for scheduled/ recommended follow up visits, this note will serve as a discharge from care along with most recent update on progress.

## 2022-11-02 ENCOUNTER — HOSPITAL ENCOUNTER (OUTPATIENT)
Dept: PHYSICAL THERAPY | Age: 42
Setting detail: THERAPIES SERIES
Discharge: HOME | End: 2022-11-02
Payer: COMMERCIAL

## 2022-11-02 PROCEDURE — 97110 THERAPEUTIC EXERCISES: CPT

## 2022-11-02 PROCEDURE — 97530 THERAPEUTIC ACTIVITIES: CPT

## 2022-11-02 PROCEDURE — 97112 NEUROMUSCULAR REEDUCATION: CPT

## 2022-11-02 NOTE — FLOWSHEET NOTE
[address] [facility],            Physical Therapy Daily Treatment Note  Date:  2022    Patient Name:  Gustavo Lezama    :  1980  MRN: [id number]  Restrictions/Precautions:    Medical/Treatment Diagnosis Information:  Diagnosis: Z01.818 (ICD-10-CM) - Pre-op testing  M16.12 (ICD-10-CM) - Primary osteoarthritis of left hip  Treatment Diagnosis: M25.552 Left hip pain  Insurance/Certification information:  PT Insurance Information: [facility]  Physician Information:   Roger Silverman MD   Has the plan of care been signed (Y/N):        [x]  Yes  []  No     Date of Patient follow up with Physician: 22 surgery       Is this a Progress Report:     [x]  Yes  []  No        If Yes:  Date Range for reporting period:  Beginnin/23  Re-eval:   PN: 10/27  Ending    Progress report will be due (10 Rx or 30 days whichever is less):       Recertification will be due (POC Duration  / 90 days whichever is less): 2022        Visit # Insurance Allowable Auth Required   7 [facility] 60 visits []  Yes [x]  No        Functional Scale: FOTO knee 57  Date assessed:       FOTO Hip          Latex Allergy:  [x]NO      []YES  Preferred Language for Healthcare:   [x]English       []other:      Pain level:  0/10 Stiffness more than pain    SUBJECTIVE:   Pt reports she was doing OK after last visit but the other night she was bent over in bed petting her cat and felt a very sharp pain in her L hip when she moved to get out of that position. Since that time, when she is sitting down, there is a very hot spot in the front of her hip that when she pushes on it, it causes severe pain in the hip. Pt also reports transition to standing still causes sharp pain when doing it quickly but that pain is more on the side of the hip.      OBJECTIVE:          (re-eval):  Strength with dynamometer:   Knee Ext R: 58.7  Knee Ext L: 42.6  Hip ABD R: 19.4  Hip ABD L: 18.6    Leg length R: 84.4cm   L: 84cm   Pt requested, states she has had leg length discrepancies her whole life - ASIS to medial malleolus     Gait: With crutch: WNL           Without crutch: increased trunk lean to the left, NBOS, slight inconsistent step length and decreased bam, increased pain and decreased stability without crutch     Sit <> stand transfer: good performance with B Ue's on chair, but when asked to perform without Ue's, pt with significant L genu valgus collapse and weight shift R   10/ 4 mild/ moderate antalgic gait entering clinic after sitting in chair. Other (workout restrictions):    10/4: Left hip incision closed and healing well, slight redness over prox. Incision but admits to performing daily. Moderate antalgia FWB w/ + trendelenberg w/ lateral sway      10/27 (PN):  Strength with dynamometer:  Knee Ext R: 64.7  Knee Ext L: 65.7  Hip flex R: 35.6  Hip flex L: 23.3  Hip ABD R: 22.4  Hip ABD L: 19.8    10/31 gait training:  Walking speed: 1.21 m/sec  Step cycle: .96 cycles/sec  Avg step length: L 63 cm, R 59 cm  Coefficient of variation: 4% B  Time on each foot: L 52, R 48    11/2:   Pt tender to palpation lateral to incision however inferior to proximal RF attachment and lateral to greater trochanter. Tenderness experienced with shallow palpation rather than deep pressure. Overall good general hip mobility with mild anterior hip/groin discomfort as anticipated post YUSEF but no reproduction of symptoms. RESTRICTIONS/PRECAUTIONS: L hip OA YUSEF on 9/7/2022 anterior approach, Ankylosing spondylitis, Crohn's disease    Exercises/Interventions:    Therapeutic Exercise (28854)79' Resistance / level Sets/sec Reps Notes / Cues   Recum.  Bike  Treadmill    10/ 27   IB stretch  HSS   Hip flexor stretch off step Rt foot up  10/ 13   SB seated trunk flexion    Lateral band walks Standing 3 way hip  10/4   Hip hikes with foam roller on wall   10/27   Supine hip flexor EOB w/ strap stretching  or deborah position EOB Prone quad stretching  10/ 11 manually   SB hip flexion 10/4            Wobble board weight shifts  Added 10/6   Clamshells sidelying Reverse clamshells Sidelying hip abd Leg Press          Leg Extension   Leg hamstring curl  , 10/31          BRIDGE SL  Bridge March  10/27  10/27   Hamstring curl and bridge on SB   10/27   Standing march with band on toes 10/27    Long sitting L hip lift and raise over foam roll 1/2 roll    Full roll 1    1 20 10/31 +11/2 increased to full foam roll   Modified deborah test position hip flexor lift and return  No wt    5# KB on foot 1    1 10    10 11/2                                      [facility] in [facility]                                          Gait (34603)                                   Therapeutic Activities (08898)       Step ups forward   Step ups lateral  Stair mobility Medbridge [id number]              MED     [id number] Sit<>Stand to chair w/ AirEx  TRX minisquats                Neuromuscular Re-ed (51489)       Airex:   SLS   AIREX NPV  Tandem  [address] 10/6 added Minimal UE support , 923 pts    Hip hinge with stick  10/27   Good mornings  10/27   Tall kneeling hip hinge       Planks on toes and forearms  Side planks on forearm and knees  Reverse nordic knee on double airex with hi/low behind to guard distance  2 10 11/2 cues for position so not to over extend lumbar spine   Sumo Squat to 8 inch box 7.5# KB 2 10 11/2   Forward step up to SLS on L 8 inch 5\" hold 10 11/2   standing, mini squat hip ER [place] band 2 10 11/2                 Manual Intervention (84392)        Manual stretching supine hip flexor/ hamstrings/ prone quad stretching  5' 10/31   STM/release of TFL/RF with Accupressure and hip movement into ABD+ER and ADD+IR     IASTM to L hip scar                            Per Dr. Renner ,  \"I think in her case, we should hold off on dry-needling near her hip flexor. I think she has an anatomic reason for why she has tendonitis.  I think it has to do with acetabular cup position. If and when she heals her left hip, my next objective was to talk to her about possible socket revision. First I would likely aspirate the hip to confirm no infection. We can try probably some noninvasive modalities to address her hip flexor in the meantime. However, I would stay away from needling so far. \"    Pt education:   10/18: Extensive discussion with pt on current presentation as well as limitations, tightness and concerns. Discussed at length progress and presentation with lacking appropriate hip flexor and glute engagement with associated movement errors with excessive hip compensation that may be contributing to her symptoms. Reviewed technique with HEP and instructed as appropriate to maximize posterior hip activation. X 15 mins    HEP:  Access Code: [email or web address]  URL: [email or web address]  Date: 10/21/2022  Prepared by: Manas Stalling    Exercises  Supine Bridge - 1 x daily - 7 x weekly - 3 sets - 10 reps  Supine Single Knee to Chest Stretch - 1 x daily - 7 x weekly - 3 sets - 10 reps  Supine Hamstring Stretch - 1 x daily - 7 x weekly - 3 sets - 10 reps  Standing Hip Flexor Stretch - 1 x daily - 7 x weekly - 3 sets - 10 reps  Side Lunge Adductor Stretch - 1 x daily - 7 x weekly - 3 sets - 10 reps  Prone Quad Stretch with Towel Roll and Strap - 1 x daily - 7 x weekly - 3 sets - 10 reps  Bent Knee Fallouts - 1 x daily - 7 x weekly - 2 sets - 10 reps  Side Stepping with Resistance at Ankles - 1 x daily - 7 x weekly - 2 sets - 10 reps  Standing Good Morning with Barbell - 1 x daily - 7 x weekly - 3 sets - 10 reps      Access Code: [email or web address]  URL: [email or web address]  Date: 10/06/2022  Prepared by: Niya Huml    Exercises  Supine Bridge - 1 x daily - 7 x weekly - 3 sets - 10 reps  Supine Single Knee to Chest Stretch - 1 x daily - 7 x weekly - 3 sets - 10 reps  Supine Hamstring Stretch - 1 x daily - 7 x weekly - 3 sets - 10 reps  Modified Cecilia Albarado sense, posture, motor skill, proprioception of core, proximal hip and LE for self care, mobility, lifting, and ambulation/stair navigation      Manual Treatments:  PROM / STM / Oscillations-Mobs:  G-I, II, III, IV (PA's, Inf., Post.)  [x] (21351) Provided manual therapy to mobilize LE, proximal hip and/or LS spine soft tissue/joints for the purpose of modulating pain, promoting relaxation,  increasing ROM, reducing/eliminating soft tissue swelling/inflammation/restriction, improving soft tissue extensibility and allowing for proper ROM for normal function with self care, mobility, lifting and ambulation. Modalities:  declined     Charges:  Timed Code Treatment Minutes: 45   Total Treatment Minutes: 45       [] EVAL (LOW) 66897 (typically 20 minutes face-to-face)  [] EVAL (MOD) 18783 (typically 30 minutes face-to-face)  [] EVAL (HIGH) 82773 (typically 45 minutes face-to-face)  [] RE-EVAL   [x] RJ(20284) x 1  [] IONTO  [x] NMR (34249) x 1    [] VASO  [] Manual (23101) x      [] Other:  [x] TA x 1   [] Mech Traction (24204)  [] ES(attended) (81650)     [] ES (un) (31597):     GOALS:   Patient stated goal: Getting back to prior level of function pain free  [x] Progressing: [] Met: [] Not Met: [] Adjusted     Therapist goals for Patient:   Long Term Goals: To be achieved in: 8 weeks  1. Independent in HEP and progression per patient tolerance, in order to prevent re-injury. [] Progressing: [x] Met: [] Not Met: [] Adjusted   2. Patient will have a decrease in pain to facilitate improvement in movement, function, and ADLs as indicated by Functional Deficits. [x] Progressing: [] Met: [] Not Met: [] Adjusted  3. Patient will score 70 or higher on FOTO hip assessment indicating subjective improvement prior to surgical intervention. [x] Progressing: [] Met: [] Not Met: [] Adjusted  4.  Patient will demonstrate increased L LE flexibility with LE that is equal to R allowing for decrease muscle tension prior to surgical intervention. .    [x] Progressing: [] Met: [] Not Met: [] Adjusted  5. Patient will be able to ambulate without device 500 feet without seated rest break with baseline mechanics . [] Progressing: [x] Met: [] Not Met: [] Adjusted  6. Patient will demonstrate seated hip flexion strength to > 33# to allow for dec difficulty performing stairs. Added 10/27  [] Progressing: [] Met: [] Not Met: [] Adjusted    Overall Progression Towards Functional goals/ Treatment Progress Update:  [] Patient is progressing as expected towards functional goals listed. [x] Progression is slowed due to complexities/Impairments listed. [] Progression has been slowed due to co-morbidities. [] Plan just implemented, too soon to assess goals progression <30days   [] Goals require adjustment due to lack of progress  [] Patient is not progressing as expected and requires additional follow up with physician  [] Other    Prognosis for POC: [x] Good [] Fair  [] Poor      Patient requires continued skilled intervention: [x] Yes  [] No    Assessment: Increased time for assessment of L hip as pt reporting several areas of elevated pain where these areas were not as prevalent last week. Difficulty with discerning area of pain as pt was tender anterior hip/lateral to incision but anterior to greater trochanter as well as inferior to RF proximal attachment and very superficial. Posterior hip pain consistent with deep hip rotator irritation with quick transition which could be consistent with limited strength and full control as pt continues to have limited strength and power production with [facility] brief MMT assessment which was weak but not painful. Pt was progressed with strength and control activity as noted to work to address anterior hip, gluteal musculature as well as moving hip through greater ranges all of which pt tolerated well, no reports of pain however significant levels of fatigue moving through these ranges.  Intermittent cueing needed with increase in fatigue for appropriate form and technique and after cueing, pt was able to demonstrate a more consistent form. Treatment/Activity Tolerance:  [x] Patient able to complete treatment  [] Patient limited by fatigue  [] Patient limited by pain     [] Patient limited by other medical complications  [] Other:     Patient Education:              8/23 Educated pt on objective findings and precautions with activity level and working out with  prior to surgery. Reviewed importance of assistive device after having surgery. Educated on importance of stretches bilaterally to decrease tenderness/tightness and decrease irritation at bilateral hips prior to surgery. 9/29: precautions, increasing walking, HEP, ice vs heat  10/6: Scar tissue management/prevention of stiffness, HEP      PLAN:   [x] Continue per plan of care [] Alter current plan (see above)  [] Plan of care initiated [] Hold pending MD visit [] Discharge       Electronically signed by:  Navdeep Acevedo, PT, DPT, OCS, OMT-C  Note: If patient does not return for scheduled/ recommended follow up visits, this note will serve as a discharge from care along with most recent update on progress.

## 2022-11-08 ENCOUNTER — APPOINTMENT (OUTPATIENT)
Dept: PHYSICAL THERAPY | Age: 42
End: 2022-11-08
Payer: COMMERCIAL

## 2022-11-08 ENCOUNTER — HOSPITAL ENCOUNTER (OUTPATIENT)
Dept: PHYSICAL THERAPY | Age: 42
Setting detail: THERAPIES SERIES
Discharge: HOME | End: 2022-11-08
Payer: COMMERCIAL

## 2022-11-08 PROCEDURE — 97110 THERAPEUTIC EXERCISES: CPT | Performed by: SPECIALIST/TECHNOLOGIST

## 2022-11-08 PROCEDURE — 97530 THERAPEUTIC ACTIVITIES: CPT | Performed by: SPECIALIST/TECHNOLOGIST

## 2022-11-08 PROCEDURE — 97140 MANUAL THERAPY 1/> REGIONS: CPT | Performed by: SPECIALIST/TECHNOLOGIST

## 2022-11-08 NOTE — FLOWSHEET NOTE
[id number] [facility],            Physical Therapy Daily Treatment Note  Date:  2022    Patient Name:  Maria E Gorman    :  1980  MRN: [id number]  Restrictions/Precautions:    Medical/Treatment Diagnosis Information:  Diagnosis: Z01.818 (ICD-10-CM) - Pre-op testing  M16.12 (ICD-10-CM) - Primary osteoarthritis of left hip  Treatment Diagnosis: M25.552 Left hip pain  Insurance/Certification information:  PT Insurance Information: [facility]  Physician Information:   Raissa Casanova MD   Has the plan of care been signed (Y/N):        [x]  Yes  []  No     Date of Patient follow up with Physician: 22 surgery       Is this a Progress Report:     [x]  Yes  []  No        If Yes:  Date Range for reporting period:  Beginnin/23  Re-eval:   PN: 10/27  Ending    Progress report will be due (10 Rx or 30 days whichever is less):       Recertification will be due (POC Duration  / 90 days whichever is less): 2022        Visit # Insurance Allowable Auth Required   7 [facility] 60 visits []  Yes [x]  No        Functional Scale: FOTO knee 57  Date assessed:       FOTO Hip          Latex Allergy:  [x]NO      []YES  Preferred Language for Healthcare:   [x]English       []other:      Pain level:  0/10 Stiffness more than pain    SUBJECTIVE:   Pt reports she was doing OK after last visit but her hip flexor were sore from eccentric exercises. Pt complaining of tightness in lateral hip. She hasn't been stretching as much at home but has been compliant with HEP. Pt reports that she did yard work over the weekend and that left her tight and little sore.           (re-eval):  Strength with dynamometer:   Knee Ext R: 58.7  Knee Ext L: 42.6  Hip ABD R: 19.4  Hip ABD L: 18.6    Leg length R: 84.4cm   L: 84cm   Pt requested, states she has had leg length discrepancies her whole life - ASIS to medial malleolus     Gait: With crutch: WNL           Without crutch: increased trunk lean to the left, NBOS, slight inconsistent step length and decreased bam, increased pain and decreased stability without crutch     Sit <> stand transfer: good performance with B Ue's on chair, but when asked to perform without Ue's, pt with significant L genu valgus collapse and weight shift R   10/ 4 mild/ moderate antalgic gait entering clinic after sitting in chair. Other (workout restrictions):    10/4: Left hip incision closed and healing well, slight redness over prox. Incision but admits to performing daily. Moderate antalgia FWB w/ + trendelenberg w/ lateral sway      10/27 (PN):  Strength with dynamometer:  Knee Ext R: 64.7  Knee Ext L: 65.7  Hip flex R: 35.6  Hip flex L: 23.3  Hip ABD R: 22.4  Hip ABD L: 19.8    10/31 gait training:  Walking speed: 1.21 m/sec  Step cycle: .96 cycles/sec  Avg step length: L 63 cm, R 59 cm  Coefficient of variation: 4% B  Time on each foot: L 52, R 48    11/2:   Pt tender to palpation lateral to incision however inferior to proximal RF attachment and lateral to greater trochanter. Tenderness experienced with shallow palpation rather than deep pressure. Overall good general hip mobility with mild anterior hip/groin discomfort as anticipated post YUSEF but no reproduction of symptoms. RESTRICTIONS/PRECAUTIONS: L hip OA YUSEF on 9/7/2022 anterior approach, Ankylosing spondylitis, Crohn's disease    Exercises/Interventions:   39'  Therapeutic Exercise (01626)11' Resistance / level Sets/sec Reps Notes / Cues   Recum.  Bike  Treadmill  5'    11/8   IB stretch  HSS   Hip flexor stretch off step Rt foot up  Half kneeling hip flexor stretch with band   30\"  30\"  30\" 1x  3x  3x 10/ 13  11/8  11/8  11/8   SB seated trunk flexion    Lateral band walks 2 10x 11/8     Standing 3 way hip  10/4   Hip hikes with foam roller on wall   1   Supine hip flexor EOB w/ strap stretching  or deborah position EOB    Prone quad stretching  1                     [facility] sidelying  SL ABD   Prone extensions/ prone donkey   Supine flexion  3# 2 10ea 11/8     Leg Press          Leg Extension   Leg hamstring curl  , 10/31                 BRIDGE SL  Bridge March  Blue sports cord marches     NPV 10/27  10/27     Hamstring curl and bridge on SB   10/27   Standing march with band on toes 10/27    Long sitting L hip lift and raise over foam roll 1/2 roll    Full roll 1    1 20 10/31 +11/2 increased to full foam roll   Modified deborah test position hip flexor lift and return  No wt    5# Velcro weight  1    1 10    10 11/8                                      [facility]                                          Gait (02113)                                   Therapeutic Activities (85039)       Step ups forward   Step ups lateral  Stair mobility Medbridge [id number]              MED     [id number] Sit<>Stand to chair  Left staggered back 2 10 11/8    TRX minisquats                Neuromuscular Re-ed (25673)       Airex:   SLS   AIREX NPV  Tandem  [address] 10/6 added Minimal UE support , 923 pts    Hip hinge with stick  10/27   Good mornings  10/27   Tall kneeling hip hinge       Planks on toes and forearms  Side planks on forearm and knees  Reverse nordic knee on double airex with hi/low behind to guard distance  2 10 11/2 cues for position so not to over extend lumbar spine   Sumo Squat to 8 inch box 7.5# KB 2 10 11/2   Forward step up to SLS on L Airex 5\" hold 10 11/8 on airex   standing, mini squat hip ER   [facility] band 2 10 11/2                 Manual Intervention (43447)  10'       Manual stretching supine hip flexor/ hamstrings/ prone quad stretching  10' 11/8   STM/release of TFL/RF with Accupressure and hip movement into ABD+ER and ADD+IR     IASTM to L hip scar                            Per Dr. Kailey Mandujano,  \"I think in her case, we should hold off on dry-needling near her hip flexor. I think she has an anatomic reason for why she has tendonitis.  I Stretch - 1 x daily - 7 x weekly - 3 sets - 10 reps  Hooklying Single Knee to Chest Stretch - 1 x daily - 7 x weekly - 3 sets - 10 reps  Standing Hip Flexor Stretch - 1 x daily - 7 x weekly - 3 sets - 10 reps  Side Lunge Adductor Stretch - 1 x daily - 7 x weekly - 3 sets - 10 reps  Prone Quad Stretch with Towel Roll and Strap - 1 x daily - 7 x weekly - 3 sets - 10 reps  Bent Knee Fallouts - 1 x daily - 7 x weekly - 2 sets - 10 reps  Side Stepping with Resistance at Ankles - 1 x daily - 7 x weekly - 2 sets - 10 reps        Therapeutic Exercise and NMR EXR  [x] (16663) Provided verbal/tactile cueing for activities related to strengthening, flexibility, endurance, ROM for improvements in LE, proximal hip, and core control with self care, mobility, lifting, ambulation.  [] (24386) Provided verbal/tactile cueing for activities related to improving balance, coordination, kinesthetic sense, posture, motor skill, proprioception  to assist with LE, proximal hip, and core control in self care, mobility, lifting, ambulation and eccentric single leg control.      NMR and Therapeutic Activities:    [x] (49224 or 75325) Provided verbal/tactile cueing for activities related to improving balance, coordination, kinesthetic sense, posture, motor skill, proprioception and motor activation to allow for proper function of core, proximal hip and LE with self care and ADLs  [] (27355) Gait Re-education- Provided training and instruction to the patient for proper LE, core and proximal hip recruitment and positioning and eccentric body weight control with ambulation re-education including up and down stairs     Home Exercise Program:    [] (15833) Reviewed/Progressed HEP activities related to strengthening, flexibility, endurance, ROM of core, proximal hip and LE for functional self-care, mobility, lifting and ambulation/stair navigation   [] (51622)Reviewed/Progressed HEP activities related to improving balance, coordination, kinesthetic sense, posture, motor skill, proprioception of core, proximal hip and LE for self care, mobility, lifting, and ambulation/stair navigation      Manual Treatments:  PROM / STM / Oscillations-Mobs:  G-I, II, III, IV (PA's, Inf., Post.)  [x] (64868) Provided manual therapy to mobilize LE, proximal hip and/or LS spine soft tissue/joints for the purpose of modulating pain, promoting relaxation,  increasing ROM, reducing/eliminating soft tissue swelling/inflammation/restriction, improving soft tissue extensibility and allowing for proper ROM for normal function with self care, mobility, lifting and ambulation. Modalities:  declined     Charges:  Timed Code Treatment Minutes: 45   Total Treatment Minutes: 45       [] EVAL (LOW) 54437 (typically 20 minutes face-to-face)  [] EVAL (MOD) 71743 (typically 30 minutes face-to-face)  [] EVAL (HIGH) 41764 (typically 45 minutes face-to-face)  [] RE-EVAL   [x] TK(77975) x 1  [] IONTO  [] NMR (81407) x     [] VASO  [x] Manual (40700) x 1     [] Other:  [x] TA x 1   [] Mech Traction (02567)  [] ES(attended) (96140)     [] ES (un) (21056):     GOALS:   Patient stated goal: Getting back to prior level of function pain free  [x] Progressing: [] Met: [] Not Met: [] Adjusted     Therapist goals for Patient:   Long Term Goals: To be achieved in: 8 weeks  1. Independent in HEP and progression per patient tolerance, in order to prevent re-injury. [] Progressing: [x] Met: [] Not Met: [] Adjusted   2. Patient will have a decrease in pain to facilitate improvement in movement, function, and ADLs as indicated by Functional Deficits. [x] Progressing: [] Met: [] Not Met: [] Adjusted  3. Patient will score 70 or higher on FOTO hip assessment indicating subjective improvement prior to surgical intervention. [x] Progressing: [] Met: [] Not Met: [] Adjusted  4.  Patient will demonstrate increased L LE flexibility with LE that is equal to R allowing for decrease muscle tension prior to surgical intervention. .    [x] Progressing: [] Met: [] Not Met: [] Adjusted  5. Patient will be able to ambulate without device 500 feet without seated rest break with baseline mechanics . [] Progressing: [x] Met: [] Not Met: [] Adjusted  6. Patient will demonstrate seated hip flexion strength to > 33# to allow for dec difficulty performing stairs. Added 10/27  [] Progressing: [] Met: [] Not Met: [] Adjusted    Overall Progression Towards Functional goals/ Treatment Progress Update:  [] Patient is progressing as expected towards functional goals listed. [x] Progression is slowed due to complexities/Impairments listed. [] Progression has been slowed due to co-morbidities. [] Plan just implemented, too soon to assess goals progression <30days   [] Goals require adjustment due to lack of progress  [] Patient is not progressing as expected and requires additional follow up with physician  [] Other    Prognosis for POC: [x] Good [] Fair  [] Poor      Patient requires continued skilled intervention: [x] Yes  [] No    Assessment: Increased time spent today on progression of baseline strength with reviewing HEP/ flexibility and strengthening with focus on bilateral glute medius/ tanya specifically. Pt was progressed with strength and control activity as noted to work to address anterior hip, gluteal musculature as well as moving hip through greater ranges all of which pt tolerated well, no reports of pain however significant levels of fatigue moving through these ranges. Pt challenged with introduction of weights/ sit- stand isolating left leg and SLS. Increased focus today on posterior chain strengthening & stabilization bilaterally even with right sided weakness. Pt compensated with LB with manual hip flexor stretching and when coming out of a chair especially on the Left hip.   Intermittent cueing needed with increase in fatigue for appropriate form and technique and after cueing, pt was able to demonstrate a more consistent form. Treatment/Activity Tolerance:  [x] Patient able to complete treatment  [] Patient limited by fatigue  [] Patient limited by pain     [] Patient limited by other medical complications  [] Other:     Patient Education:              8/23 Educated pt on objective findings and precautions with activity level and working out with  prior to surgery. Reviewed importance of assistive device after having surgery. Educated on importance of stretches bilaterally to decrease tenderness/tightness and decrease irritation at bilateral hips prior to surgery. 9/29: precautions, increasing walking, HEP, ice vs heat  10/6: Scar tissue management/prevention of stiffness, HEP      PLAN: Updated HEP, and discussed progression of strength and flexibility with purchasing wts. .   [x] Continue per plan of care [] Alter current plan (see above)  [] Plan of care initiated [] Hold pending MD visit [] Discharge       Electronically signed by:  Jude Davis PTA, [id number]  Note: If patient does not return for scheduled/ recommended follow up visits, this note will serve as a discharge from care along with most recent update on progress.

## 2022-11-10 ENCOUNTER — HOSPITAL ENCOUNTER (OUTPATIENT)
Dept: PHYSICAL THERAPY | Age: 42
Setting detail: THERAPIES SERIES
Discharge: HOME | End: 2022-11-10
Payer: COMMERCIAL

## 2022-11-10 PROCEDURE — 97140 MANUAL THERAPY 1/> REGIONS: CPT

## 2022-11-10 PROCEDURE — 97110 THERAPEUTIC EXERCISES: CPT

## 2022-11-10 NOTE — FLOWSHEET NOTE
[id number] [facility],            Physical Therapy Daily Treatment Note  Date:  11/10/2022    Patient Name:  Gustavo Lezama    :  1980  MRN: [id number]  Restrictions/Precautions:    Medical/Treatment Diagnosis Information:  Diagnosis: Z01.818 (ICD-10-CM) - Pre-op testing  M16.12 (ICD-10-CM) - Primary osteoarthritis of left hip  Treatment Diagnosis: M25.552 Left hip pain  Insurance/Certification information:  PT Insurance Information: [facility]  Physician Information:   Roger Silverman MD   Has the plan of care been signed (Y/N):        [x]  Yes  []  No     Date of Patient follow up with Physician: 22 surgery       Is this a Progress Report:     [x]  Yes  []  No        If Yes:  Date Range for reporting period:  Beginnin/23  Re-eval:   PN: 10/27  Ending    Progress report will be due (10 Rx or 30 days whichever is less):       Recertification will be due (POC Duration  / 90 days whichever is less): 2022        Visit # Insurance Allowable Auth Required   8 [facility] 60 visits []  Yes [x]  No        Functional Scale: FOTO knee 57  Date assessed:       FOTO Hip          Latex Allergy:  [x]NO      []YES  Preferred Language for Healthcare:   [x]English       []other:      Pain level:  0/10 Stiffness more than pain    SUBJECTIVE:   Pt reports she was doing okay, having ups and downs, just feels stiff.           (re-eval):  Strength with dynamometer:   Knee Ext R: 58.7  Knee Ext L: 42.6  Hip ABD R: 19.4  Hip ABD L: 18.6    Leg length R: 84.4cm   L: 84cm   Pt requested, states she has had leg length discrepancies her whole life - ASIS to medial malleolus     Gait: With crutch: WNL           Without crutch: increased trunk lean to the left, NBOS, slight inconsistent step length and decreased bam, increased pain and decreased stability without crutch     Sit <> stand transfer: good performance with B Ue's on chair, but when asked to perform without Ue's, pt with significant L genu valgus collapse and weight shift R   10/ 4 mild/ moderate antalgic gait entering clinic after sitting in chair. Other (workout restrictions):    10/4: Left hip incision closed and healing well, slight redness over prox. Incision but admits to performing daily. Moderate antalgia FWB w/ + trendelenberg w/ lateral sway      10/27 (PN):  Strength with dynamometer:  Knee Ext R: 64.7  Knee Ext L: 65.7  Hip flex R: 35.6  Hip flex L: 23.3  Hip ABD R: 22.4  Hip ABD L: 19.8    10/31 gait training:  Walking speed: 1.21 m/sec  Step cycle: .96 cycles/sec  Avg step length: L 63 cm, R 59 cm  Coefficient of variation: 4% B  Time on each foot: L 52, R 48    11/2:   Pt tender to palpation lateral to incision however inferior to proximal RF attachment and lateral to greater trochanter. Tenderness experienced with shallow palpation rather than deep pressure. Overall good general hip mobility with mild anterior hip/groin discomfort as anticipated post YUSEF but no reproduction of symptoms. RESTRICTIONS/PRECAUTIONS: L hip OA YUSEF on 9/7/2022 anterior approach, Ankylosing spondylitis, Crohn's disease    Exercises/Interventions:    Therapeutic Exercise (16131)46' Resistance / level Sets/sec Reps Notes / Cues   Recum.  Bike  Treadmill  5'    11/10   IB stretch  HSS   Hip flexor stretch off step Rt foot up  Half kneeling hip flexor stretch with band   30\"  30\"  2xea  1x B  11/10   SB seated trunk flexion    Lateral band walks 11/8     Standing 3 way hip  10/4   Hip hikes with foam roller on wall   1   Supine hip flexor EOB w/ strap stretching  or deborah position EOB    Prone quad stretching  1                     Clamshells sidelying  SL ABD   Prone extensions/ prone donkey   Supine flexion  11/8     Leg Press          Leg Extension   Leg hamstring curl  , 10/31                 BRIDGE SL  Bridge March  Blue sports cord marches     NPV 10/27  10/27     Hamstring curl and bridge on SB   10/27 However, I would stay away from needling so far. \"    Pt education:   10/18: Extensive discussion with pt on current presentation as well as limitations, tightness and concerns. Discussed at length progress and presentation with lacking appropriate hip flexor and glute engagement with associated movement errors with excessive hip compensation that may be contributing to her symptoms. Reviewed technique with HEP and instructed as appropriate to maximize posterior hip activation. X 15 mins    HEP:  Access Code: [email or web address]  URL: Level[employer]/  Date: 10/21/2022  Prepared by: Sherlyn Parra    Exercises  Supine Bridge - 1 x daily - 7 x weekly - 3 sets - 10 reps  Supine Single Knee to Chest Stretch - 1 x daily - 7 x weekly - 3 sets - 10 reps  Supine Hamstring Stretch - 1 x daily - 7 x weekly - 3 sets - 10 reps  Standing Hip Flexor Stretch - 1 x daily - 7 x weekly - 3 sets - 10 reps  Side Lunge Adductor Stretch - 1 x daily - 7 x weekly - 3 sets - 10 reps  Prone Quad Stretch with Towel Roll and Strap - 1 x daily - 7 x weekly - 3 sets - 10 reps  Bent Knee Fallouts - 1 x daily - 7 x weekly - 2 sets - 10 reps  Side Stepping with Resistance at Ankles - 1 x daily - 7 x weekly - 2 sets - 10 reps  Standing Good Morning with Barbell - 1 x daily - 7 x weekly - 3 sets - 10 reps      Access Code: [email or web address]  URL: [email or web address]  Date: 10/06/2022  Prepared by: [facility]    Exercises  Supine Bridge - 1 x daily - 7 x weekly - 3 sets - 10 reps  Supine Single Knee to Chest Stretch - 1 x daily - 7 x weekly - 3 sets - 10 reps  Supine Hamstring Stretch - 1 x daily - 7 x weekly - 3 sets - 10 reps  Modified Curtis Stretch - 1 x daily - 7 x weekly - 3 sets - 10 reps  Hooklying Single Knee to Chest Stretch - 1 x daily - 7 x weekly - 3 sets - 10 reps  Standing Hip Flexor Stretch - 1 x daily - 7 x weekly - 3 sets - 10 reps  Side Lunge Adductor Stretch - 1 x daily - 7 x weekly - 3 sets - 10 reps  Prone Quad Stretch with Towel Roll and Strap - 1 x daily - 7 x weekly - 3 sets - 10 reps  Bent Knee Fallouts - 1 x daily - 7 x weekly - 2 sets - 10 reps  Side Stepping with Resistance at Ankles - 1 x daily - 7 x weekly - 2 sets - 10 reps        Therapeutic Exercise and NMR EXR  [x] (99262) Provided verbal/tactile cueing for activities related to strengthening, flexibility, endurance, ROM for improvements in LE, proximal hip, and core control with self care, mobility, lifting, ambulation.  [] (61152) Provided verbal/tactile cueing for activities related to improving balance, coordination, kinesthetic sense, posture, motor skill, proprioception  to assist with LE, proximal hip, and core control in self care, mobility, lifting, ambulation and eccentric single leg control.      NMR and Therapeutic Activities:    [x] (77655 or 42424) Provided verbal/tactile cueing for activities related to improving balance, coordination, kinesthetic sense, posture, motor skill, proprioception and motor activation to allow for proper function of core, proximal hip and LE with self care and ADLs  [] (55748) Gait Re-education- Provided training and instruction to the patient for proper LE, core and proximal hip recruitment and positioning and eccentric body weight control with ambulation re-education including up and down stairs     Home Exercise Program:    [] (78793) Reviewed/Progressed HEP activities related to strengthening, flexibility, endurance, ROM of core, proximal hip and LE for functional self-care, mobility, lifting and ambulation/stair navigation   [] (42490)Reviewed/Progressed HEP activities related to improving balance, coordination, kinesthetic sense, posture, motor skill, proprioception of core, proximal hip and LE for self care, mobility, lifting, and ambulation/stair navigation      Manual Treatments:  PROM / STM / Oscillations-Mobs:  G-I, II, III, IV (PA's, Inf., Post.)  [x] (46010) Provided manual therapy to mobilize LE, proximal hip and/or LS spine soft tissue/joints for the purpose of modulating pain, promoting relaxation,  increasing ROM, reducing/eliminating soft tissue swelling/inflammation/restriction, improving soft tissue extensibility and allowing for proper ROM for normal function with self care, mobility, lifting and ambulation. Modalities:  declined     Charges:  Timed Code Treatment Minutes: 45   Total Treatment Minutes: 45       [] EVAL (LOW) 31409 (typically 20 minutes face-to-face)  [] EVAL (MOD) 76535 (typically 30 minutes face-to-face)  [] EVAL (HIGH) 65435 (typically 45 minutes face-to-face)  [] RE-EVAL   [x] GL(35602) x 2  [] IONTO  [] NMR (80666) x     [] VASO  [x] Manual (81885) x 1     [] Other:  [] TA x    [] Mech Traction (22482)  [] ES(attended) (27553)     [] ES (un) (79606):     GOALS:   Patient stated goal: Getting back to prior level of function pain free  [x] Progressing: [] Met: [] Not Met: [] Adjusted     Therapist goals for Patient:   Long Term Goals: To be achieved in: 8 weeks  1. Independent in HEP and progression per patient tolerance, in order to prevent re-injury. [] Progressing: [x] Met: [] Not Met: [] Adjusted   2. Patient will have a decrease in pain to facilitate improvement in movement, function, and ADLs as indicated by Functional Deficits. [x] Progressing: [] Met: [] Not Met: [] Adjusted  3. Patient will score 70 or higher on FOTO hip assessment indicating subjective improvement prior to surgical intervention. [x] Progressing: [] Met: [] Not Met: [] Adjusted  4. Patient will demonstrate increased L LE flexibility with LE that is equal to R allowing for decrease muscle tension prior to surgical intervention. .    [x] Progressing: [] Met: [] Not Met: [] Adjusted  5. Patient will be able to ambulate without device 500 feet without seated rest break with baseline mechanics . [] Progressing: [x] Met: [] Not Met: [] Adjusted  6.  Patient will demonstrate seated hip flexion strength to > 33# to allow for dec difficulty performing stairs. Added 10/27  [] Progressing: [] Met: [] Not Met: [] Adjusted    Overall Progression Towards Functional goals/ Treatment Progress Update:  [] Patient is progressing as expected towards functional goals listed. [x] Progression is slowed due to complexities/Impairments listed. [] Progression has been slowed due to co-morbidities. [] Plan just implemented, too soon to assess goals progression <30days   [] Goals require adjustment due to lack of progress  [] Patient is not progressing as expected and requires additional follow up with physician  [] Other    Prognosis for POC: [x] Good [] Fair  [] Poor      Patient requires continued skilled intervention: [x] Yes  [] No    Assessment: Pt improving this date with progressions of strengthening exercises. She presents with genu valgum when completing split squats, applying a valgus force with band to correct. She tolerated nordic curls and reverse curls well with good mm response. Continues to present with increased hip flexor tightness and improving hamstring tightness. Continue therapy to improve upon her strengthening and flexibility and improving form of exercises. Treatment/Activity Tolerance:  [x] Patient able to complete treatment  [] Patient limited by fatigue  [] Patient limited by pain     [] Patient limited by other medical complications  [] Other:     Patient Education:              8/23 Educated pt on objective findings and precautions with activity level and working out with  prior to surgery. Reviewed importance of assistive device after having surgery. Educated on importance of stretches bilaterally to decrease tenderness/tightness and decrease irritation at bilateral hips prior to surgery. 9/29: precautions, increasing walking, HEP, ice vs heat  10/6: Scar tissue management/prevention of stiffness, HEP      PLAN: Updated HEP, and discussed progression of strength and flexibility with purchasing wts. .   [x] Continue per plan of care [] Alter current plan (see above)  [] Plan of care initiated [] Hold pending MD visit [] Discharge       Electronically signed by:  Adelaida Fulton, PT, DPT, OMT-C  Therapist was present, directed the patient's care, made skilled judgement, and was responsible for assessment and treatment of the patient. Pratima Davies, SIDNEY   Note: If patient does not return for scheduled/ recommended follow up visits, this note will serve as a discharge from care along with most recent update on progress.

## 2022-11-14 ENCOUNTER — HOSPITAL ENCOUNTER (OUTPATIENT)
Dept: PHYSICAL THERAPY | Age: 42
Setting detail: THERAPIES SERIES
Discharge: HOME | End: 2022-11-14
Payer: COMMERCIAL

## 2022-11-14 PROCEDURE — 97110 THERAPEUTIC EXERCISES: CPT

## 2022-11-14 PROCEDURE — 97530 THERAPEUTIC ACTIVITIES: CPT

## 2022-11-14 NOTE — FLOWSHEET NOTE
[id number] [facility],            Physical Therapy Daily Treatment Note  Date:  2022    Patient Name:  Monse Huerta    :  1980  MRN: [id number]  Restrictions/Precautions:    Medical/Treatment Diagnosis Information:  Diagnosis: Z01.818 (ICD-10-CM) - Pre-op testing  M16.12 (ICD-10-CM) - Primary osteoarthritis of left hip  Treatment Diagnosis: M25.552 Left hip pain  Insurance/Certification information:  PT Insurance Information: [facility]  Physician Information:   Ko Yepez MD   Has the plan of care been signed (Y/N):        [x]  Yes  []  No     Date of Patient follow up with Physician: 22 surgery       Is this a Progress Report:     [x]  Yes  []  No        If Yes:  Date Range for reporting period:  Beginnin/23  Re-eval:   PN: 10/27  Ending    Progress report will be due (10 Rx or 30 days whichever is less):       Recertification will be due (POC Duration  / 90 days whichever is less): 2022        Visit # Insurance Allowable Auth Required   9 [facility] 60 visits []  Yes [x]  No        Functional Scale: FOTO knee 57  Date assessed:       FOTO Hip          Latex Allergy:  [x]NO      []YES  Preferred Language for Healthcare:   [x]English       []other:      Pain level:  0/10 Stiffness more than pain    SUBJECTIVE:   Pt reports she was in [place] over the weekend. Pt reports reverse nordic curl is so difficult and makes her sore.          (re-eval):  Strength with dynamometer:   Knee Ext R: 58.7  Knee Ext L: 42.6  Hip ABD R: 19.4  Hip ABD L: 18.6    Leg length R: 84.4cm   L: 84cm   Pt requested, states she has had leg length discrepancies her whole life - ASIS to medial malleolus     Gait: With crutch: WNL           Without crutch: increased trunk lean to the left, NBOS, slight inconsistent step length and decreased bam, increased pain and decreased stability without crutch     Sit <> stand transfer: good performance with B Ue's on chair, but when asked to perform without Ue's, pt with significant L genu valgus collapse and weight shift R   10/ 4 mild/ moderate antalgic gait entering clinic after sitting in chair. Other (workout restrictions):    10/4: Left hip incision closed and healing well, slight redness over prox. Incision but admits to performing daily. Moderate antalgia FWB w/ + trendelenberg w/ lateral sway      10/27 (PN):  Strength with dynamometer:  Knee Ext R: 64.7  Knee Ext L: 65.7  Hip flex R: 35.6  Hip flex L: 23.3  Hip ABD R: 22.4  Hip ABD L: 19.8    10/31 gait training:  Walking speed: 1.21 m/sec  Step cycle: .96 cycles/sec  Avg step length: L 63 cm, R 59 cm  Coefficient of variation: 4% B  Time on each foot: L 52, R 48    11/2:   Pt tender to palpation lateral to incision however inferior to proximal RF attachment and lateral to greater trochanter. Tenderness experienced with shallow palpation rather than deep pressure. Overall good general hip mobility with mild anterior hip/groin discomfort as anticipated post YUSEF but no reproduction of symptoms. RESTRICTIONS/PRECAUTIONS: L hip OA YUSEF on 9/7/2022 anterior approach, Ankylosing spondylitis, Crohn's disease    Exercises/Interventions:    Therapeutic Exercise (29984)89' Resistance / level Sets/sec Reps Notes / Cues   Recum.  Bike  Treadmill  5'    11/10   IB stretch  HSS   Hip flexor stretch off step Rt foot up  Half kneeling hip flexor stretch with band   11/10   SB seated trunk flexion    Lateral band walks 11/8     Standing 3 way hip  10/4   Hip hikes with foam roller on wall   1   Supine hip flexor EOB w/ strap stretching  or deborah position EOB    Prone quad stretching  1                     Clamshells sidelying  SL ABD   Prone extensions/ prone donkey   Supine flexion  11/8     Leg Press          Leg Extension   Leg hamstring curl  , 10/31                 BRIDGE SL  Bridge March  Blue sports cord marches  Sport cord duck walks    1'  2' 10/27  10/27     Hamstring curl and bridge on SB   10/27   Standing march with band on toes 10/27    10/31 +11/2 increased to full foam roll   Modified deborah test position hip flexor lift and return    5# Velcro weight  11/10   Split squats with T-bar bar 11/10    Split squats with valgus force resistance  No weight 11/10    Squat with T bar bar 11/10                  [employer]:  - leg press DL, SL B  - RDL  - TRX retro lunge     Barbell      30' 11/14                                      Gait (17332)                                   Therapeutic Activities (64943)       Step ups forward   Step ups lateral  Stair mobility Med[employer] [id number]              MED     [id number] Sit<>Stand to chair  Left staggered back 11/8    TRX minisquats                Neuromuscular Re-ed (23003)       Airex:   SLS   AIREX NPV  Tandem  [address] 10/6 added Minimal UE support , 923 pts    Hip hinge with stick  10/27   Good mornings  10/27   Tall kneeling hip hinge       Planks on toes and forearms  Side planks on forearm and knees  Reverse nordic knee on double airex with hi/low behind to guard distance  Nordic curls to hi/low mat   1      1 15      10 11/10    Sumo Squat to 8 inch box 11/2   Forward step up to SLS on L 11/8 on airex   standing, mini squat hip ER   11/2                 Manual Intervention (00531)         Manual stretching supine hip flexor/ hamstrings/ prone quad stretching  11/10   STM/release of TFL/RF with Accupressure and hip movement into ABD+ER and ADD+IR     IASTM to L hip scar                            Per Dr. Singh Davis,  \"I think in her case, we should hold off on dry-needling near her hip flexor. I think she has an anatomic reason for why she has tendonitis. I think it has to do with acetabular cup position. If and when she heals her left hip, my next objective was to talk to her about possible socket revision. First I would likely aspirate the hip to confirm no infection.    We can try probably some noninvasive modalities to address her hip flexor in the meantime. However, I would stay away from needling so far. \"    Pt education:   10/18: Extensive discussion with pt on current presentation as well as limitations, tightness and concerns. Discussed at length progress and presentation with lacking appropriate hip flexor and glute engagement with associated movement errors with excessive hip compensation that may be contributing to her symptoms. Reviewed technique with HEP and instructed as appropriate to maximize posterior hip activation. X 15 mins    HEP:  Access Code: [email or web address]  URL: [email or web address]  Date: 10/21/2022  Prepared by: Georgina Cabrera    Exercises  Supine Bridge - 1 x daily - 7 x weekly - 3 sets - 10 reps  Supine Single Knee to Chest Stretch - 1 x daily - 7 x weekly - 3 sets - 10 reps  Supine Hamstring Stretch - 1 x daily - 7 x weekly - 3 sets - 10 reps  Standing Hip Flexor Stretch - 1 x daily - 7 x weekly - 3 sets - 10 reps  Side Lunge Adductor Stretch - 1 x daily - 7 x weekly - 3 sets - 10 reps  Prone Quad Stretch with Towel Roll and Strap - 1 x daily - 7 x weekly - 3 sets - 10 reps  Bent Knee Fallouts - 1 x daily - 7 x weekly - 2 sets - 10 reps  Side Stepping with Resistance at Ankles - 1 x daily - 7 x weekly - 2 sets - 10 reps  Standing Good Morning with Barbell - 1 x daily - 7 x weekly - 3 sets - 10 reps      Access Code: [email or web address]  URL: [employer]/  Date: 10/06/2022  Prepared by: Olive Temple    Exercises  Supine Bridge - 1 x daily - 7 x weekly - 3 sets - 10 reps  Supine Single Knee to Chest Stretch - 1 x daily - 7 x weekly - 3 sets - 10 reps  Supine Hamstring Stretch - 1 x daily - 7 x weekly - 3 sets - 10 reps  Modified Curtis Stretch - 1 x daily - 7 x weekly - 3 sets - 10 reps  Hooklying Single Knee to Chest Stretch - 1 x daily - 7 x weekly - 3 sets - 10 reps  Standing Hip Flexor Stretch - 1 x daily - 7 x weekly - 3 sets - 10 reps  Side Lunge Adductor Post.)  [x] (59719) Provided manual therapy to mobilize LE, proximal hip and/or LS spine soft tissue/joints for the purpose of modulating pain, promoting relaxation,  increasing ROM, reducing/eliminating soft tissue swelling/inflammation/restriction, improving soft tissue extensibility and allowing for proper ROM for normal function with self care, mobility, lifting and ambulation. Modalities:  declined     Charges:  Timed Code Treatment Minutes: 45   Total Treatment Minutes: 45       [] EVAL (LOW) 38176 (typically 20 minutes face-to-face)  [] EVAL (MOD) 97606 (typically 30 minutes face-to-face)  [] EVAL (HIGH) 76826 (typically 45 minutes face-to-face)  [] RE-EVAL   [x] QJ(82678) x 1  [] IONTO  [] NMR (85632) x     [] VASO  [] Manual (16906) x      [] Other:  [x] TA x 2   [] Mech Traction (68244)  [] ES(attended) (60471)     [] ES (un) (66371):     GOALS:   Patient stated goal: Getting back to prior level of function pain free  [x] Progressing: [] Met: [] Not Met: [] Adjusted     Therapist goals for Patient:   Long Term Goals: To be achieved in: 8 weeks  1. Independent in HEP and progression per patient tolerance, in order to prevent re-injury. [] Progressing: [x] Met: [] Not Met: [] Adjusted   2. Patient will have a decrease in pain to facilitate improvement in movement, function, and ADLs as indicated by Functional Deficits. [x] Progressing: [] Met: [] Not Met: [] Adjusted  3. Patient will score 70 or higher on FOTO hip assessment indicating subjective improvement prior to surgical intervention. [x] Progressing: [] Met: [] Not Met: [] Adjusted  4. Patient will demonstrate increased L LE flexibility with LE that is equal to R allowing for decrease muscle tension prior to surgical intervention. .    [x] Progressing: [] Met: [] Not Met: [] Adjusted  5. Patient will be able to ambulate without device 500 feet without seated rest break with baseline mechanics .     [] Progressing: [x] Met: [] Not Met: [] Adjusted  6. Patient will demonstrate seated hip flexion strength to > 33# to allow for dec difficulty performing stairs. Added 10/27  [] Progressing: [] Met: [] Not Met: [] Adjusted    Overall Progression Towards Functional goals/ Treatment Progress Update:  [] Patient is progressing as expected towards functional goals listed. [x] Progression is slowed due to complexities/Impairments listed. [] Progression has been slowed due to co-morbidities. [] Plan just implemented, too soon to assess goals progression <30days   [] Goals require adjustment due to lack of progress  [] Patient is not progressing as expected and requires additional follow up with physician  [] Other    Prognosis for POC: [x] Good [] Fair  [] Poor      Patient requires continued skilled intervention: [x] Yes  [] No    Assessment:   [facility] visit this date. Patient with difficulty activating glutes vs hamstrings. Cues during retro lunge to pull earth behind her. Discussed how tight anterior hip musculature can make it difficult to posterior hip musculature to activate due to dec range of motion, perform stretches prior to hip ext exercises. Also discussed sitting temporarily shortens anterior hip musculature, therefore common to have stiffness upon standing erect/lengthening hip flexors. Pt verbalized understanding to education provided. Will continue therapy to improve upon her strengthening and flexibility and improving form during exercises. Treatment/Activity Tolerance:  [x] Patient able to complete treatment  [] Patient limited by fatigue  [] Patient limited by pain     [] Patient limited by other medical complications  [] Other:     Patient Education:              8/23 Educated pt on objective findings and precautions with activity level and working out with  prior to surgery. Reviewed importance of assistive device after having surgery.  Educated on importance of stretches bilaterally to decrease tenderness/tightness and decrease irritation at bilateral hips prior to surgery. 9/29: precautions, increasing walking, HEP, ice vs heat  10/6: Scar tissue management/prevention of stiffness, HEP      PLAN: Updated HEP, and discussed progression of strength and flexibility with purchasing wts. .   [x] Continue per plan of care [] Alter current plan (see above)  [] Plan of care initiated [] Hold pending MD visit [] Discharge       Electronically signed by:  Zachery Weiss, PT, DPT, OMT-C    Note: If patient does not return for scheduled/ recommended follow up visits, this note will serve as a discharge from care along with most recent update on progress.

## 2022-11-17 ENCOUNTER — HOSPITAL ENCOUNTER (OUTPATIENT)
Dept: PHYSICAL THERAPY | Age: 42
Setting detail: THERAPIES SERIES
Discharge: HOME | End: 2022-11-17
Payer: COMMERCIAL

## 2022-11-17 PROCEDURE — 97112 NEUROMUSCULAR REEDUCATION: CPT

## 2022-11-17 PROCEDURE — 97530 THERAPEUTIC ACTIVITIES: CPT

## 2022-11-17 PROCEDURE — 97110 THERAPEUTIC EXERCISES: CPT

## 2022-11-17 NOTE — FLOWSHEET NOTE
[id number] [facility],            Physical Therapy Daily Treatment Note  Date:  2022    Patient Name:  Joe Vogel    :  1980  MRN: [id number]  Restrictions/Precautions:    Medical/Treatment Diagnosis Information:  Diagnosis: Z01.818 (ICD-10-CM) - Pre-op testing  M16.12 (ICD-10-CM) - Primary osteoarthritis of left hip  Treatment Diagnosis: M25.552 Left hip pain  Insurance/Certification information:  PT Insurance Information: [place]  Physician Information:   Gracy De Anda MD   Has the plan of care been signed (Y/N):        [x]  Yes  []  No     Date of Patient follow up with Physician: 22 surgery       Is this a Progress Report:     [x]  Yes  []  No        If Yes:  Date Range for reporting period:  Beginnin/23  Re-eval:   PN: 10/27  Ending    Progress report will be due (10 Rx or 30 days whichever is less):       Recertification will be due (POC Duration  / 90 days whichever is less): 2022        Visit # Insurance Allowable Auth Required   [address] 60 visits []  Yes [x]  No        Functional Scale: FOTO knee 57  Date assessed:       FOTO Hip          Latex Allergy:  [x]NO      []YES  Preferred Language for Healthcare:   [x]English       []other:      Pain level:  0/10 Stiffness more than pain    SUBJECTIVE:   Pt states she has been doing better, hip has been feeling good with less pain and improved mobility.  Pt reports she [place] able [place] and was there for longer than 4 hours and noticed her hip the last 5 minutes or so.          (re-eval):  Strength with dynamometer:   Knee Ext R: 58.7  Knee Ext L: 42.6  Hip ABD R: 19.4  Hip ABD L: 18.6    Leg length R: 84.4cm   L: 84cm   Pt requested, states she has had leg length discrepancies her whole life - ASIS to medial malleolus     Gait: With crutch: WNL           Without crutch: increased trunk lean to the left, NBOS, slight inconsistent step length and decreased bam, increased pain and decreased stability without crutch     Sit <> stand transfer: good performance with B Ue's on chair, but when asked to perform without Ue's, pt with significant L genu valgus collapse and weight shift R   10/ 4 mild/ moderate antalgic gait entering clinic after sitting in chair. Other (workout restrictions):    10/4: Left hip incision closed and healing well, slight redness over prox. Incision but admits to performing daily. Moderate antalgia FWB w/ + trendelenberg w/ lateral sway      10/27 (PN):  Strength with dynamometer:  Knee Ext R: 64.7  Knee Ext L: 65.7  Hip flex R: 35.6  Hip flex L: 23.3  Hip ABD R: 22.4  Hip ABD L: 19.8    10/31 gait training:  Walking speed: 1.21 m/sec  Step cycle: .96 cycles/sec  Avg step length: L 63 cm, R 59 cm  Coefficient of variation: 4% B  Time on each foot: L 52, R 48    11/2:   Pt tender to palpation lateral to incision however inferior to proximal RF attachment and lateral to greater trochanter. Tenderness experienced with shallow palpation rather than deep pressure. Overall good general hip mobility with mild anterior hip/groin discomfort as anticipated post YUSEF but no reproduction of symptoms. RESTRICTIONS/PRECAUTIONS: L hip OA YUSEF on 9/7/2022 anterior approach, Ankylosing spondylitis, Crohn's disease    Exercises/Interventions:    Therapeutic Exercise (12402)56' Resistance / level Sets/sec Reps Notes / Cues   Recum.  Bike  Treadmill  5'    11/10   IB stretch  HSS   Hip flexor stretch off step Rt foot up  Half kneeling hip flexor stretch with band   11/10   SB seated trunk flexion    Lateral band walks 11/8     Standing 3 way hip  10/4   Hip hikes with foam roller on wall   1   Supine hip flexor EOB w/ strap stretching  or deborah position EOB    Prone quad stretching  1                     Clamshells sidelying  SL ABD   Prone extensions/ prone donkey   Supine flexion  11/8     Leg Press          Leg Extension   Leg hamstring curl  , 10/31                 10/27  10/27     Hamstring curl and bridge on SB   10/27   Standing march with band on toes 10/27    10/31 +11/2 increased to full foam roll   Modified deborah test position hip flexor lift and return    5# Velcro weight  2 10 11/10   11/10    11/10    11/10                                                     Gait (29042)                                   Therapeutic Activities (86685)       Step ups forward   Step ups lateral  Stair mobility Medbridge [id number]              MED     [id number] Sit<>Stand to chair  Left staggered back 11/8    TRX minisquats                Neuromuscular Re-ed (91879)       Airex:   SLS   AIREX NPV  Tandem  [address] 10/6 added Minimal UE support , 923 pts    Hip hinge with stick  10/27   Good mornings  10/27   Tall kneeling hip hinge       Planks on toes and forearms  Side planks on forearm and knees  Reverse nordic knee on double airex with hi/low behind to guard distance   2      1 10      10 11/10    Sumo Squat to 4 inch box 7.5# KB 2 10 11/2   Forward step up to SLS on L 11/8 on airex   standing, mini squat hip ER   11/2   Forward step up to SLS 8 inch 5\" hold 5 R/l 11/17   Lateral step up to SLS 6 inch holding 10# KB contralaterally 5\" hold 10 R/L 11/17   Static lunge To airex 1 10 R/L 11/17   Lateral lunge  1 10 R/L 11/17   TRX SL squat  2 10 R/L 11/17   RDL with bar 15 Kg 2 10 11/17          Manual Intervention (95502)         Manual stretching supine hip flexor/ hamstrings/ prone quad stretching  11/10   STM/release of TFL/RF with Accupressure and hip movement into ABD+ER and ADD+IR     IASTM to L hip scar                            Per Dr. Moi Duncan,  \"I think in her case, we should hold off on dry-needling near her hip flexor. I think she has an anatomic reason for why she has tendonitis. I think it has to do with acetabular cup position.  If and when she heals her left hip, my next objective was to talk to her about possible socket revision. First I would likely aspirate the hip to confirm no infection. We can try probably some noninvasive modalities to address her hip flexor in the meantime. However, I would stay away from needling so far. \"    Pt education:   10/18: Extensive discussion with pt on current presentation as well as limitations, tightness and concerns. Discussed at length progress and presentation with lacking appropriate hip flexor and glute engagement with associated movement errors with excessive hip compensation that may be contributing to her symptoms. Reviewed technique with HEP and instructed as appropriate to maximize posterior hip activation. X 15 mins    HEP:  Access Code: [email or web address]  URL: [employer]/  Date: 10/21/2022  Prepared by: Debbie Umana    Exercises  Supine Bridge - 1 x daily - 7 x weekly - 3 sets - 10 reps  Supine Single Knee to Chest Stretch - 1 x daily - 7 x weekly - 3 sets - 10 reps  Supine Hamstring Stretch - 1 x daily - 7 x weekly - 3 sets - 10 reps  Standing Hip Flexor Stretch - 1 x daily - 7 x weekly - 3 sets - 10 reps  Side Lunge Adductor Stretch - 1 x daily - 7 x weekly - 3 sets - 10 reps  Prone Quad Stretch with Towel Roll and Strap - 1 x daily - 7 x weekly - 3 sets - 10 reps  Bent Knee Fallouts - 1 x daily - 7 x weekly - 2 sets - 10 reps  Side Stepping with Resistance at Ankles - 1 x daily - 7 x weekly - 2 sets - 10 reps  Standing Good Morning with Barbell - 1 x daily - 7 x weekly - 3 sets - 10 reps      Access Code: [email or web address]  URL: [email or web address]  Date: 10/06/2022  Prepared by: Nisa Brush Huml    Exercises  Supine Bridge - 1 x daily - 7 x weekly - 3 sets - 10 reps  Supine Single Knee to Chest Stretch - 1 x daily - 7 x weekly - 3 sets - 10 reps  Supine Hamstring Stretch - 1 x daily - 7 x weekly - 3 sets - 10 reps  Modified Curtis Stretch - 1 x daily - 7 x weekly - 3 sets - 10 reps  Hooklying Single Knee to Chest Stretch - 1 x daily - 7 x weekly - 3 sets - 10 reps  Standing Hip Flexor Stretch - 1 x daily - 7 x weekly - 3 sets - 10 reps  Side Lunge Adductor Stretch - 1 x daily - 7 x weekly - 3 sets - 10 reps  Prone Quad Stretch with Towel Roll and Strap - 1 x daily - 7 x weekly - 3 sets - 10 reps  Bent Knee Fallouts - 1 x daily - 7 x weekly - 2 sets - 10 reps  Side Stepping with Resistance at Ankles - 1 x daily - 7 x weekly - 2 sets - 10 reps        Therapeutic Exercise and NMR EXR  [x] (24141) Provided verbal/tactile cueing for activities related to strengthening, flexibility, endurance, ROM for improvements in LE, proximal hip, and core control with self care, mobility, lifting, ambulation.  [] (73596) Provided verbal/tactile cueing for activities related to improving balance, coordination, kinesthetic sense, posture, motor skill, proprioception  to assist with LE, proximal hip, and core control in self care, mobility, lifting, ambulation and eccentric single leg control.      NMR and Therapeutic Activities:    [x] (85567 or 32470) Provided verbal/tactile cueing for activities related to improving balance, coordination, kinesthetic sense, posture, motor skill, proprioception and motor activation to allow for proper function of core, proximal hip and LE with self care and ADLs  [] (64245) Gait Re-education- Provided training and instruction to the patient for proper LE, core and proximal hip recruitment and positioning and eccentric body weight control with ambulation re-education including up and down stairs     Home Exercise Program:    [] (05548) Reviewed/Progressed HEP activities related to strengthening, flexibility, endurance, ROM of core, proximal hip and LE for functional self-care, mobility, lifting and ambulation/stair navigation   [] (58653)Reviewed/Progressed HEP activities related to improving balance, coordination, kinesthetic sense, posture, motor skill, proprioception of core, proximal hip and LE for self care, mobility, lifting, and ambulation/stair navigation      Manual Treatments:  PROM / STM / Oscillations-Mobs:  G-I, II, III, IV (PA's, Inf., Post.)  [x] (11307) Provided manual therapy to mobilize LE, proximal hip and/or LS spine soft tissue/joints for the purpose of modulating pain, promoting relaxation,  increasing ROM, reducing/eliminating soft tissue swelling/inflammation/restriction, improving soft tissue extensibility and allowing for proper ROM for normal function with self care, mobility, lifting and ambulation. Modalities:  declined     Charges:  Timed Code Treatment Minutes: 45   Total Treatment Minutes: 45       [] EVAL (LOW) 24804 (typically 20 minutes face-to-face)  [] EVAL (MOD) 80756 (typically 30 minutes face-to-face)  [] EVAL (HIGH) 20107 (typically 45 minutes face-to-face)  [] RE-EVAL   [x] YL(75952) x 1  [] IONTO  [x] NMR (91843) x 2    [] VASO  [] Manual (26030) x      [] Other:  [] TA x 2   [] Mech Traction (23515)  [] ES(attended) (90342)     [] ES (un) (22442):     GOALS:   Patient stated goal: Getting back to prior level of function pain free  [x] Progressing: [] Met: [] Not Met: [] Adjusted     Therapist goals for Patient:   Long Term Goals: To be achieved in: 8 weeks  1. Independent in HEP and progression per patient tolerance, in order to prevent re-injury. [] Progressing: [x] Met: [] Not Met: [] Adjusted   2. Patient will have a decrease in pain to facilitate improvement in movement, function, and ADLs as indicated by Functional Deficits. [x] Progressing: [] Met: [] Not Met: [] Adjusted  3. Patient will score 70 or higher on FOTO hip assessment indicating subjective improvement prior to surgical intervention. [x] Progressing: [] Met: [] Not Met: [] Adjusted  4. Patient will demonstrate increased L LE flexibility with LE that is equal to R allowing for decrease muscle tension prior to surgical intervention. .    [x] Progressing: [] Met: [] Not Met: [] Adjusted  5.  Patient will be able to ambulate without device 500 feet without seated rest break with baseline mechanics . [] Progressing: [x] Met: [] Not Met: [] Adjusted  6. Patient will demonstrate seated hip flexion strength to > 33# to allow for dec difficulty performing stairs. Added 10/27  [] Progressing: [] Met: [] Not Met: [] Adjusted    Overall Progression Towards Functional goals/ Treatment Progress Update:  [] Patient is progressing as expected towards functional goals listed. [x] Progression is slowed due to complexities/Impairments listed. [] Progression has been slowed due to co-morbidities. [] Plan just implemented, too soon to assess goals progression <30days   [] Goals require adjustment due to lack of progress  [] Patient is not progressing as expected and requires additional follow up with physician  [] Other    Prognosis for POC: [x] Good [] Fair  [] Poor      Patient requires continued skilled intervention: [x] Yes  [] No    Assessment:   pt with good visit today, overall doing well with improved response recently with increased strength activity focus with less pain and apprehension noted. Pt with good carryover with activity outside of PT becoming easier and less pain reported. During session, pt does continues to fatigue rather quickly with SL progressions especially compared to R LE activity tolerance and difficulty overall with glute engagement on consistent basis. Treatment/Activity Tolerance:  [x] Patient able to complete treatment  [] Patient limited by fatigue  [] Patient limited by pain     [] Patient limited by other medical complications  [] Other:     Patient Education:              8/23 Educated pt on objective findings and precautions with activity level and working out with  prior to surgery. Reviewed importance of assistive device after having surgery. Educated on importance of stretches bilaterally to decrease tenderness/tightness and decrease irritation at bilateral hips prior to surgery.    9/29: precautions, increasing walking, HEP, ice vs heat  10/6: Scar tissue management/prevention of stiffness, HEP      PLAN: Updated HEP, and discussed progression of strength and flexibility with purchasing wts. .   [x] Continue per plan of care [] Alter current plan (see above)  [] Plan of care initiated [] Hold pending MD visit [] Discharge       Electronically signed by:  Julia Cunningham, PT, DPT, OCS, OMT-C    Note: If patient does not return for scheduled/ recommended follow up visits, this note will serve as a discharge from care along with most recent update on progress.

## 2022-11-22 ENCOUNTER — HOSPITAL ENCOUNTER (OUTPATIENT)
Dept: PHYSICAL THERAPY | Age: 42
Setting detail: THERAPIES SERIES
Discharge: HOME | End: 2022-11-22
Payer: COMMERCIAL

## 2022-11-22 PROCEDURE — 97112 NEUROMUSCULAR REEDUCATION: CPT

## 2022-11-22 PROCEDURE — 97530 THERAPEUTIC ACTIVITIES: CPT

## 2022-11-22 PROCEDURE — 97110 THERAPEUTIC EXERCISES: CPT

## 2022-11-22 NOTE — FLOWSHEET NOTE
[address] [facility],            Physical Therapy Daily Treatment Note  Date:  2022    Patient Name:  Adiel Magallanes    :  1980  MRN: [id number]  Restrictions/Precautions:    Medical/Treatment Diagnosis Information:  Diagnosis: Z01.818 (ICD-10-CM) - Pre-op testing  M16.12 (ICD-10-CM) - Primary osteoarthritis of left hip  Treatment Diagnosis: M25.552 Left hip pain  Insurance/Certification information:  PT Insurance Information: [facility]  Physician Information:   Viral Scott MD   Has the plan of care been signed (Y/N):        [x]  Yes  []  No     Date of Patient follow up with Physician: 22 surgery       Is this a Progress Report:     [x]  Yes  []  No        If Yes:  Date Range for reporting period:  Beginnin/23  Re-eval:   PN: 10/27  Ending    Progress report will be due (10 Rx or 30 days whichever is less): 808      Recertification will be due (POC Duration  / 90 days whichever is less): 2022        Visit # Insurance Allowable Auth Required   [address] 60 visits []  Yes [x]  No        Functional Scale: FOTO knee 57  Date assessed:       FOTO Hip          Latex Allergy:  [x]NO      []YES  Preferred Language for Healthcare:   [x]English       []other:      Pain level:  0/10 Stiffness more than pain    SUBJECTIVE:   Pt reports after last session she was sore for a day but overall not bad and for the most part completely resolved by the day after. Today generally stiff in the hips but no pain.       (re-eval):  Strength with dynamometer:   Knee Ext R: 58.7  Knee Ext L: 42.6  Hip ABD R: 19.4  Hip ABD L: 18.6    Leg length R: 84.4cm   L: 84cm   Pt requested, states she has had leg length discrepancies her whole life - ASIS to medial malleolus     Gait: With crutch: WNL           Without crutch: increased trunk lean to the left, NBOS, slight inconsistent step length and decreased bam, increased pain and decreased stability without crutch     Sit <> stand transfer: good performance with B Ue's on chair, but when asked to perform without Ue's, pt with significant L genu valgus collapse and weight shift R   10/ 4 mild/ moderate antalgic gait entering clinic after sitting in chair. Other (workout restrictions):    10/4: Left hip incision closed and healing well, slight redness over prox. Incision but admits to performing daily. Moderate antalgia FWB w/ + trendelenberg w/ lateral sway      10/27 (PN):  Strength with dynamometer:  Knee Ext R: 64.7  Knee Ext L: 65.7  Hip flex R: 35.6  Hip flex L: 23.3  Hip ABD R: 22.4  Hip ABD L: 19.8    10/31 gait training:  Walking speed: 1.21 m/sec  Step cycle: .96 cycles/sec  Avg step length: L 63 cm, R 59 cm  Coefficient of variation: 4% B  Time on each foot: L 52, R 48    11/2:   Pt tender to palpation lateral to incision however inferior to proximal RF attachment and lateral to greater trochanter. Tenderness experienced with shallow palpation rather than deep pressure. Overall good general hip mobility with mild anterior hip/groin discomfort as anticipated post YUSEF but no reproduction of symptoms. RESTRICTIONS/PRECAUTIONS: L hip OA YUSEF on 9/7/2022 anterior approach, Ankylosing spondylitis, Crohn's disease    Exercises/Interventions:    Therapeutic Exercise (03025)78' Resistance / level Sets/sec Reps Notes / Cues   Recum.  Bike  Treadmill  5'    11/10   IB stretch  HSS   Hip flexor stretch off step Rt foot up  Half kneeling hip flexor stretch with band   11/10   SB seated trunk flexion    Lateral band walks 11/8     Standing 3 way hip  10/4   Hip hikes with foam roller on wall   1   Supine hip flexor EOB w/ strap stretching  or deborah position EOB    Prone quad stretching  30\" 3x 1   Supine figure 4 piriformis stretch 20\" 3 11/22                 Clamshells sidelying  SL ABD   Prone extensions/ prone donkey   Supine flexion  11/8     Leg Press          Leg Extension   Leg hamstring curl  , 10/31                 10/27  10/27     Hamstring curl and bridge on SB   10/27   Standing march with band on toes 10/27    10/31 +11/2 increased to full foam roll   Modified deborah test position hip flexor lift and return    5# Velcro weight  2 10 11/10   11/10    11/10    11/10                                                     Gait (27076)                                   Therapeutic Activities (82213)       Step ups forward   Step ups lateral  Stair mobility Medbridge [id number]              MED     [id number] Sit<>Stand to chair  Left staggered back 11/8    TRX minisquats                Neuromuscular Re-ed (25737)       Airex:   SLS   AIREX NPV  Tandem  [address] 10/6 added Minimal UE support , 923 pts    Hip hinge with stick  10/27   Good mornings  10/27   Tall kneeling hip hinge       Planks on toes and forearms  Side planks on forearm and knees  Reverse nordic knee on double airex with hi/low behind to guard distance   2      1 10      10 11/10    Sumo Squat to 4 inch box 7.5# KB 2 10 11/2   Forward step up to SLS on L 11/8 on airex   standing, mini squat hip ER   11/2   Forward step up to SLS 8 inch 5\" hold 5 R/l 11/17   Lateral step up to SLS 6 inch holding 10# KB contralaterally 5\" hold 10 R/L 11/17   Static lunge To airex 2 10 R/L 11/17   Lateral lunge  2 10 R/L 11/17   RDL with bar 15 Kg 2 15 11/17   SL RDL  1 10 R/L 11/22                                      Manual Intervention (99690)         Manual stretching supine hip flexor/ hamstrings/ prone quad stretching  11/10   STM/release of TFL/RF with Accupressure and hip movement into ABD+ER and ADD+IR     IASTM to L hip scar                            Per Dr. Hector Serra,  \"I think in her case, we should hold off on dry-needling near her hip flexor. I think she has an anatomic reason for why she has tendonitis. I think it has to do with acetabular cup position.  If and when she heals her left hip, my next objective was to talk to her about possible socket revision. First I would likely aspirate the hip to confirm no infection. We can try probably some noninvasive modalities to address her hip flexor in the meantime. However, I would stay away from needling so far. \"    Pt education:   10/18: Extensive discussion with pt on current presentation as well as limitations, tightness and concerns. Discussed at length progress and presentation with lacking appropriate hip flexor and glute engagement with associated movement errors with excessive hip compensation that may be contributing to her symptoms. Reviewed technique with HEP and instructed as appropriate to maximize posterior hip activation. X 15 mins    HEP:  Access Code: [email or web address]  URL: [email or web address]  Date: 10/21/2022  Prepared by: Georgina Cabrera    Exercises  Supine Bridge - 1 x daily - 7 x weekly - 3 sets - 10 reps  Supine Single Knee to Chest Stretch - 1 x daily - 7 x weekly - 3 sets - 10 reps  Supine Hamstring Stretch - 1 x daily - 7 x weekly - 3 sets - 10 reps  Standing Hip Flexor Stretch - 1 x daily - 7 x weekly - 3 sets - 10 reps  Side Lunge Adductor Stretch - 1 x daily - 7 x weekly - 3 sets - 10 reps  Prone Quad Stretch with Towel Roll and Strap - 1 x daily - 7 x weekly - 3 sets - 10 reps  Bent Knee Fallouts - 1 x daily - 7 x weekly - 2 sets - 10 reps  Side Stepping with Resistance at Ankles - 1 x daily - 7 x weekly - 2 sets - 10 reps  Standing Good Morning with Barbell - 1 x daily - 7 x weekly - 3 sets - 10 reps      Access Code: [email or web address]  URL: [email or web address]  Date: 10/06/2022  Prepared by: Olive Temple    Exercises  Supine Bridge - 1 x daily - 7 x weekly - 3 sets - 10 reps  Supine Single Knee to Chest Stretch - 1 x daily - 7 x weekly - 3 sets - 10 reps  Supine Hamstring Stretch - 1 x daily - 7 x weekly - 3 sets - 10 reps  Modified Curtis Stretch - 1 x daily - 7 x weekly - 3 sets - 10 reps  Hooklying Single Knee to Chest Stretch - 1 x daily - 7 x weekly - 3 sets - 10 reps  Standing Hip Flexor Stretch - 1 x daily - 7 x weekly - 3 sets - 10 reps  Side Lunge Adductor Stretch - 1 x daily - 7 x weekly - 3 sets - 10 reps  Prone Quad Stretch with Towel Roll and Strap - 1 x daily - 7 x weekly - 3 sets - 10 reps  Bent Knee Fallouts - 1 x daily - 7 x weekly - 2 sets - 10 reps  Side Stepping with Resistance at Ankles - 1 x daily - 7 x weekly - 2 sets - 10 reps        Therapeutic Exercise and NMR EXR  [x] (92402) Provided verbal/tactile cueing for activities related to strengthening, flexibility, endurance, ROM for improvements in LE, proximal hip, and core control with self care, mobility, lifting, ambulation.  [] (00281) Provided verbal/tactile cueing for activities related to improving balance, coordination, kinesthetic sense, posture, motor skill, proprioception  to assist with LE, proximal hip, and core control in self care, mobility, lifting, ambulation and eccentric single leg control.      NMR and Therapeutic Activities:    [x] (28766 or 39637) Provided verbal/tactile cueing for activities related to improving balance, coordination, kinesthetic sense, posture, motor skill, proprioception and motor activation to allow for proper function of core, proximal hip and LE with self care and ADLs  [] (47859) Gait Re-education- Provided training and instruction to the patient for proper LE, core and proximal hip recruitment and positioning and eccentric body weight control with ambulation re-education including up and down stairs     Home Exercise Program:    [] (19402) Reviewed/Progressed HEP activities related to strengthening, flexibility, endurance, ROM of core, proximal hip and LE for functional self-care, mobility, lifting and ambulation/stair navigation   [] (40460)Reviewed/Progressed HEP activities related to improving balance, coordination, kinesthetic sense, posture, motor skill, proprioception of core, proximal hip and LE for self care, mobility, lifting, and ambulation/stair navigation      Manual Treatments:  PROM / STM / Oscillations-Mobs:  G-I, II, III, IV (PA's, Inf., Post.)  [x] (87939) Provided manual therapy to mobilize LE, proximal hip and/or LS spine soft tissue/joints for the purpose of modulating pain, promoting relaxation,  increasing ROM, reducing/eliminating soft tissue swelling/inflammation/restriction, improving soft tissue extensibility and allowing for proper ROM for normal function with self care, mobility, lifting and ambulation. Modalities:  declined     Charges:  Timed Code Treatment Minutes: 45   Total Treatment Minutes: 45       [] EVAL (LOW) 71217 (typically 20 minutes face-to-face)  [] EVAL (MOD) 98608 (typically 30 minutes face-to-face)  [] EVAL (HIGH) 11386 (typically 45 minutes face-to-face)  [] RE-EVAL   [x] YD(95271) x 1  [] IONTO  [x] NMR (23590) x 2    [] VASO  [] Manual (07878) x      [] Other:  [] TA x 2   [] Mech Traction (59828)  [] ES(attended) (02249)     [] ES (un) (46946):     GOALS:   Patient stated goal: Getting back to prior level of function pain free  [x] Progressing: [] Met: [] Not Met: [] Adjusted     Therapist goals for Patient:   Long Term Goals: To be achieved in: 8 weeks  1. Independent in HEP and progression per patient tolerance, in order to prevent re-injury. [] Progressing: [x] Met: [] Not Met: [] Adjusted   2. Patient will have a decrease in pain to facilitate improvement in movement, function, and ADLs as indicated by Functional Deficits. [x] Progressing: [] Met: [] Not Met: [] Adjusted  3. Patient will score 70 or higher on FOTO hip assessment indicating subjective improvement prior to surgical intervention. [x] Progressing: [] Met: [] Not Met: [] Adjusted  4. Patient will demonstrate increased L LE flexibility with LE that is equal to R allowing for decrease muscle tension prior to surgical intervention. .    [x] Progressing: [] Met: [] Not Met: [] Adjusted  5.  Patient will be able to ambulate without device 500 feet without seated rest break with baseline mechanics . [] Progressing: [x] Met: [] Not Met: [] Adjusted  6. Patient will demonstrate seated hip flexion strength to > 33# to allow for dec difficulty performing stairs. Added 10/27  [] Progressing: [] Met: [] Not Met: [] Adjusted    Overall Progression Towards Functional goals/ Treatment Progress Update:  [] Patient is progressing as expected towards functional goals listed. [x] Progression is slowed due to complexities/Impairments listed. [] Progression has been slowed due to co-morbidities. [] Plan just implemented, too soon to assess goals progression <30days   [] Goals require adjustment due to lack of progress  [] Patient is not progressing as expected and requires additional follow up with physician  [] Other    Prognosis for POC: [x] Good [] Fair  [] Poor      Patient requires continued skilled intervention: [x] Yes  [] No    Assessment:   Increased reports of tightness in hip today addressed with stretching and activation interventions as noted per log in which pt reports improved hip mobility. Strength activity progressed as noted per log as pt continues to progress with strength, tolerance and control. Minimal cues needed for form and pt is able to demonstrate improved consistency with hip mobility with strength activation. Will f/u next session for POC and d/c vs continuation of PT. Treatment/Activity Tolerance:  [x] Patient able to complete treatment  [] Patient limited by fatigue  [] Patient limited by pain     [] Patient limited by other medical complications  [] Other:     Patient Education:              8/23 Educated pt on objective findings and precautions with activity level and working out with  prior to surgery. Reviewed importance of assistive device after having surgery.  Educated on importance of stretches bilaterally to decrease tenderness/tightness and decrease irritation at bilateral hips prior to surgery. 9/29: precautions, increasing walking, HEP, ice vs heat  10/6: Scar tissue management/prevention of stiffness, HEP      PLAN: Updated HEP, and discussed progression of strength and flexibility with purchasing wts. .   [x] Continue per plan of care [] Alter current plan (see above)  [] Plan of care initiated [] Hold pending MD visit [] Discharge       Electronically signed by:  Kelsey Willis, PT, DPT, OCS, OMT-C    Note: If patient does not return for scheduled/ recommended follow up visits, this note will serve as a discharge from care along with most recent update on progress.

## 2022-11-28 ENCOUNTER — HOSPITAL ENCOUNTER (OUTPATIENT)
Dept: PHYSICAL THERAPY | Age: 42
Setting detail: THERAPIES SERIES
Discharge: HOME | End: 2022-11-28
Payer: COMMERCIAL

## 2022-11-28 NOTE — FLOWSHEET NOTE
[address]     Physical Therapy  Cancellation/No-show Note  Patient Name:  Jolanta Jhaveri  :  1980   Date:  2022  Cancelled visits to date: 1  No-shows to date: 0    Patient status for today's appointment patient:  [x]  Cancelled   []  Rescheduled appointment  []  No-show     Reason given by patient:  []  Patient ill  []  Conflicting appointment  []  No transportation    [x]  Conflict with work - Was called in early  []  No reason given  []  Other:     Comments:      Phone call information:   []  Phone call made today to patient at _ time at number provided:      []  Patient answered, conversation as follows:    []  Patient did not answer, message left as follows:  []  Phone call not made today  [x]  Phone call not needed - pt contacted us to cancel and provided reason for cancellation.      Electronically signed by:  Reid Roach, PT DPT, OCS, OMT-C

## 2022-11-30 ENCOUNTER — HOSPITAL ENCOUNTER (OUTPATIENT)
Age: 42
Discharge: HOME | End: 2022-11-30
Payer: COMMERCIAL

## 2022-11-30 LAB
INFLUENZA A: NOT DETECTED
INFLUENZA B: NOT DETECTED
SARS-COV-2 RNA, RT PCR: DETECTED

## 2022-11-30 PROCEDURE — 87636 SARSCOV2 & INF A&B AMP PRB: CPT

## 2022-12-06 ENCOUNTER — HOSPITAL ENCOUNTER (OUTPATIENT)
Dept: PHYSICAL THERAPY | Age: 42
Setting detail: THERAPIES SERIES
Discharge: HOME | End: 2022-12-06
Payer: COMMERCIAL

## 2022-12-06 PROCEDURE — 97530 THERAPEUTIC ACTIVITIES: CPT

## 2022-12-06 PROCEDURE — 97110 THERAPEUTIC EXERCISES: CPT

## 2022-12-06 NOTE — PLAN OF CARE
[id number] [facility],    Physical Therapy Re-Certification Plan of Care    Dear Svetlana Perez MD  ,    We had the pleasure of treating the following patient for physical therapy services at [facility]. A summary of our findings can be found in the updated assessment below. This includes our plan of care. If you have any questions or concerns regarding these findings, please do not hesitate to contact me at the office phone number checked above. Thank you for the referral.     Physician Signature:________________________________Date:__________________  By signing above (or electronic signature), therapist's plan is approved by physician      Functional Outcome:     FOTO: 82            ROM PROM AROM Comments     Left Right Left Right     Flexion    110 110     Extension          Abduction          Adduction           ER           IR                            Strength Left Right Comments   Hip flexors 4 4+     Hip extension 5 5     Hip abduction 4 4+     Hip adduction 4+ 4+     Hip ER 4+ 5     Hip IR 4 4+     Quads 5 5     Hamstrings 5 5          Assessment:   Pt has progressed really well with PT POC. Pt currently demonstrating overall no pain, great ROM and continued progression with strength. Pt currently offering no reports of limitations or issues with hip and consistently returning to repetitive activity and increased demand activity all without issues or restrictions. At this time, HEP was reviewed with pt and will trial d/c to HEP at this time with f/u prn.      Overall Response to Treatment:   [x]Patient is responding well to treatment and improvement is noted with regards  to goals   []Patient should continue to improve in reasonable time if they continue HEP   []Patient has plateaued and is no longer responding to skilled PT intervention    []Patient is getting worse and would benefit from return to referring MD   []Patient unable to adhere to initial POC   []Other: -    Date range of Visits:  -   Total Visits: 11    Recommendation:    []Continue PT -x / wk for - weeks. [x]Hold PT, trial d/c to [facility]            Physical Therapy Daily Treatment Note  Date:  2022    Patient Name:  Dulce Edgar    :  1980  MRN: [id number]  Restrictions/Precautions:    Medical/Treatment Diagnosis Information:  Diagnosis: Z01.818 (ICD-10-CM) - Pre-op testing  M16.12 (ICD-10-CM) - Primary osteoarthritis of left hip  Treatment Diagnosis: M25.552 Left hip pain  Insurance/Certification information:  PT Insurance Information: [facility]  Physician Information:   Nohemy Cabezas MD   Has the plan of care been signed (Y/N):        [x]  Yes  []  No     Date of Patient follow up with Physician: 22 surgery       Is this a Progress Report:     [x]  Yes  []  No        If Yes:  Date Range for reporting period:  Beginnin/23  Re-eval:   PN: 10/27  Ending    Progress report will be due (10 Rx or 30 days whichever is less):       Recertification will be due (POC Duration  / 90 days whichever is less): 2022        Visit # Insurance Allowable Auth Required   Helen [place] [address]. 62. 60 visits []  Yes [x]  No        Functional Scale: FOTO knee 57  Date assessed:     FOTO: 82           FOTO Hip          Latex Allergy:  [x]NO      []YES  Preferred Language for Healthcare:   [x]English       []other:      Pain level:  0/10 Stiffness more than pain    SUBJECTIVE:   Pt reports overall her pain is not much of an issue and feels intermittent soreness in the hip. 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 Pt does admit she does get stiff and sore with the excessive time on her feet but no restrictions or major issues.       (re-eval):  Strength with dynamometer:   Knee Ext R: 58.7  Knee Ext L: 42.6  Hip ABD R: 19.4  Hip ABD L: 18.6    Leg length R: 84.4cm   L: 84cm   Pt requested, states she has had leg length discrepancies her whole life - ASIS to medial malleolus     Gait: With crutch: WNL           Without crutch: increased trunk lean to the left, NBOS, slight inconsistent step length and decreased bam, increased pain and decreased stability without crutch     Sit <> stand transfer: good performance with B Ue's on chair, but when asked to perform without Ue's, pt with significant L genu valgus collapse and weight shift R   10/ 4 mild/ moderate antalgic gait entering clinic after sitting in chair. Other (workout restrictions):    10/4: Left hip incision closed and healing well, slight redness over prox. Incision but admits to performing daily. Moderate antalgia FWB w/ + trendelenberg w/ lateral sway      10/27 (PN):  Strength with dynamometer:  Knee Ext R: 64.7  Knee Ext L: 65.7  Hip flex R: 35.6  Hip flex L: 23.3  Hip ABD R: 22.4  Hip ABD L: 19.8    10/31 gait training:  Walking speed: 1.21 m/sec  Step cycle: .96 cycles/sec  Avg step length: L 63 cm, R 59 cm  Coefficient of variation: 4% B  Time on each foot: L 52, R 48    11/2:   Pt tender to palpation lateral to incision however inferior to proximal RF attachment and lateral to greater trochanter. Tenderness experienced with shallow palpation rather than deep pressure. Overall good general hip mobility with mild anterior hip/groin discomfort as anticipated post YUSEF but no reproduction of symptoms. RESTRICTIONS/PRECAUTIONS: L hip OA YUSEF on 9/7/2022 anterior approach, Ankylosing spondylitis, Crohn's disease    Exercises/Interventions:    Therapeutic Exercise (23055)63' Resistance / level Sets/sec Reps Notes / Cues   Recum.  Bike  Treadmill  5'    11/10   IB stretch  HSS   Hip flexor stretch off step Rt foot up  Half kneeling hip flexor stretch with band   11/10   SB seated trunk flexion    Lateral band walks 11/8     Standing 3 way hip  10/4   Hip hikes with foam roller on wall   1   Supine hip flexor EOB w/ strap stretching  or deborah position EOB    Prone quad stretching  30\" 3x 1   Supine figure 4 piriformis stretch 20\" 3 11/22                 Clamshells sidelying  SL ABD   Prone extensions/ prone donkey   Supine flexion  11/8     Leg Press          Leg Extension   Leg hamstring curl  , 10/31                 10/27  10/27     Hamstring curl and bridge on SB   10/27   Standing march with band on toes 10/27    10/31 +11/2 increased to full foam roll   Modified deborah test position hip flexor lift and return    5# Velcro weight  2 10 11/10   11/10    11/10    11/10                                                     Gait (33515)                                   Therapeutic Activities (53419)       Step ups forward   Step ups lateral  Stair mobility Medbridge [id number]              MED     [id number] Sit<>Stand to chair  Left staggered back 11/8    TRX minisquats                Neuromuscular Re-ed (49788)       Airex:   SLS   AIREX NPV  Tandem  [address] 10/6 added Minimal UE support , 923 pts    Hip hinge with stick  10/27   Good mornings  10/27   Tall kneeling hip hinge       Planks on toes and forearms  Side planks on forearm and knees  Reverse nordic knee on double airex with hi/low behind to guard distance   2      1 10      10 11/10    Sumo Squat to 4 inch box 7.5# KB 2 10 11/2   Forward step up to SLS on L 11/8 on airex   standing, mini squat hip ER   11/2   Forward step up to SLS 8 inch 5\" hold 5 R/l 11/17   Lateral step up to SLS 6 inch holding 10# KB contralaterally 5\" hold 10 R/L 11/17   Static lunge To airex 2 10 R/L 11/17   Lateral lunge  2 10 R/L 11/17   RDL with bar 15 Kg 2 15 11/17   SL RDL  1 10 R/L 11/22                                      Manual Intervention (04365)         Manual stretching supine hip flexor/ hamstrings/ prone quad stretching  11/10   STM/release of TFL/RF with Accupressure and hip movement into ABD+ER and ADD+IR     IASTM to L hip scar                            Per Dr. Beth Mendoza,  \"I think in her case, we should hold off on dry-needling near her hip flexor. I think she has an anatomic reason for why she has tendonitis. I think it has to do with acetabular cup position. If and when she heals her left hip, my next objective was to talk to her about possible socket revision. First I would likely aspirate the hip to confirm no infection. We can try probably some noninvasive modalities to address her hip flexor in the meantime. However, I would stay away from needling so far. \"    Pt education:   10/18: Extensive discussion with pt on current presentation as well as limitations, tightness and concerns. Discussed at length progress and presentation with lacking appropriate hip flexor and glute engagement with associated movement errors with excessive hip compensation that may be contributing to her symptoms. Reviewed technique with HEP and instructed as appropriate to maximize posterior hip activation. X 15 mins    HEP:  Access Code: [email or web address]  URL: [employer]/  Date: 12/06/2022  Prepared by: Marcela Villarreal    Exercises  Reverse Lunge - 1 x daily - 7 x weekly - 3 sets - 10 reps  Kneeling Eccentric Hamstring Strengthening with Caregiver - 1 x daily - 7 x weekly - 3 sets - 10 reps  Dumbbell Squat at Shoulders - 1 x daily - 7 x weekly - 3 sets - 10 reps      Access Code: [email or web address]  URL: [email or web address]  Date: 10/21/2022  Prepared by: Marcela Doyne    Exercises  Supine Bridge - 1 x daily - 7 x weekly - 3 sets - 10 reps  Supine Single Knee to Chest Stretch - 1 x daily - 7 x weekly - 3 sets - 10 reps  Supine Hamstring Stretch - 1 x daily - 7 x weekly - 3 sets - 10 reps  Standing Hip Flexor Stretch - 1 x daily - 7 x weekly - 3 sets - 10 reps  Side Lunge Adductor Stretch - 1 x daily - 7 x weekly - 3 sets - 10 reps  Prone Quad Stretch with Towel Roll and Strap - 1 x daily - 7 x weekly - 3 sets - 10 reps  Bent Knee Fallouts - 1 x daily - 7 x weekly - 2 sets - 10 reps  Side Stepping with Resistance at Ankles - 1 x daily - 7 x weekly - 2 sets - 10 reps  Standing Good Morning with Barbell - 1 x daily - 7 x weekly - 3 sets - 10 reps      Access Code: [email or web address]  URL: [email or web address]  Date: 10/06/2022  Prepared by: Shira Temple    Exercises  Supine Bridge - 1 x daily - 7 x weekly - 3 sets - 10 reps  Supine Single Knee to Chest Stretch - 1 x daily - 7 x weekly - 3 sets - 10 reps  Supine Hamstring Stretch - 1 x daily - 7 x weekly - 3 sets - 10 reps  Modified Curtis Stretch - 1 x daily - 7 x weekly - 3 sets - 10 reps  Hooklying Single Knee to Chest Stretch - 1 x daily - 7 x weekly - 3 sets - 10 reps  Standing Hip Flexor Stretch - 1 x daily - 7 x weekly - 3 sets - 10 reps  Side Lunge Adductor Stretch - 1 x daily - 7 x weekly - 3 sets - 10 reps  Prone Quad Stretch with Towel Roll and Strap - 1 x daily - 7 x weekly - 3 sets - 10 reps  Bent Knee Fallouts - 1 x daily - 7 x weekly - 2 sets - 10 reps  Side Stepping with Resistance at Ankles - 1 x daily - 7 x weekly - 2 sets - 10 reps        Therapeutic Exercise and NMR EXR  [x] (52166) Provided verbal/tactile cueing for activities related to strengthening, flexibility, endurance, ROM for improvements in LE, proximal hip, and core control with self care, mobility, lifting, ambulation.  [] (55736) Provided verbal/tactile cueing for activities related to improving balance, coordination, kinesthetic sense, posture, motor skill, proprioception  to assist with LE, proximal hip, and core control in self care, mobility, lifting, ambulation and eccentric single leg control.      NMR and Therapeutic Activities:    [x] (17014 or 16514) Provided verbal/tactile cueing for activities related to improving balance, coordination, kinesthetic sense, posture, motor skill, proprioception and motor activation to allow for proper function of core, proximal hip and LE with self care and ADLs  [] (98150) Gait Re-education- Provided training and instruction to the patient for proper LE, core and proximal hip recruitment and positioning and eccentric body weight control with ambulation re-education including up and down stairs     Home Exercise Program:    [] (33240) Reviewed/Progressed HEP activities related to strengthening, flexibility, endurance, ROM of core, proximal hip and LE for functional self-care, mobility, lifting and ambulation/stair navigation   [] (53493)Reviewed/Progressed HEP activities related to improving balance, coordination, kinesthetic sense, posture, motor skill, proprioception of core, proximal hip and LE for self care, mobility, lifting, and ambulation/stair navigation      Manual Treatments:  PROM / STM / Oscillations-Mobs:  G-I, II, III, IV (PA's, Inf., Post.)  [x] (57021) Provided manual therapy to mobilize LE, proximal hip and/or LS spine soft tissue/joints for the purpose of modulating pain, promoting relaxation,  increasing ROM, reducing/eliminating soft tissue swelling/inflammation/restriction, improving soft tissue extensibility and allowing for proper ROM for normal function with self care, mobility, lifting and ambulation. Modalities:  declined     Charges:  Timed Code Treatment Minutes: 40   Total Treatment Minutes: 40       [] EVAL (LOW) 40748 (typically 20 minutes face-to-face)  [] EVAL (MOD) 62427 (typically 30 minutes face-to-face)  [] EVAL (HIGH) 41692 (typically 45 minutes face-to-face)  [] RE-EVAL   [x] FL(74460) x 1  [] IONTO  [] NMR (35901) x 2    [] VASO  [] Manual (45066) x      [] Other:  [x] TA x 2   [] Mech Traction (35292)  [] ES(attended) (72166)     [] ES (un) (75935):     GOALS:   Patient stated goal: Getting back to prior level of function pain free  [x] Progressing: [] Met: [] Not Met: [] Adjusted     Therapist goals for Patient:   Long Term Goals: To be achieved in: 8 weeks  1.  Independent in HEP and progression per patient tolerance, in order to prevent re-injury. [] Progressing: [x] Met: [] Not Met: [] Adjusted   2. Patient will have a decrease in pain to facilitate improvement in movement, function, and ADLs as indicated by Functional Deficits. [] Progressing: [x] Met: [] Not Met: [] Adjusted  3. Patient will score 70 or higher on FOTO hip assessment indicating subjective improvement prior to surgical intervention. [] Progressing: [x] Met: [] Not Met: [] Adjusted  4. Patient will demonstrate increased L LE flexibility with LE that is equal to R allowing for decrease muscle tension prior to surgical intervention. .    [] Progressing: [x] Met: [] Not Met: [] Adjusted  5. Patient will be able to ambulate without device 500 feet without seated rest break with baseline mechanics . [] Progressing: [x] Met: [] Not Met: [] Adjusted  6. Patient will demonstrate seated hip flexion strength to > 33# to allow for dec difficulty performing stairs. Added 10/27  [] Progressing: [] Met: [] Not Met: [] Adjusted    Overall Progression Towards Functional goals/ Treatment Progress Update:  [] Patient is progressing as expected towards functional goals listed. [x] Progression is slowed due to complexities/Impairments listed. [] Progression has been slowed due to co-morbidities. [] Plan just implemented, too soon to assess goals progression <30days   [] Goals require adjustment due to lack of progress  [] Patient is not progressing as expected and requires additional follow up with physician  [] Other    Prognosis for POC: [x] Good [] Fair  [] Poor      Patient requires continued skilled intervention: [x] Yes  [] No    Assessment:   Pt has progressed really well with PT POC. Pt currently demonstrating overall no pain, great ROM and continued progression with strength. Pt currently offering no reports of limitations or issues with hip and consistently returning to repetitive activity and increased demand activity all without issues or restrictions.  At this time, HEP was reviewed with pt and will trial d/c to HEP at this time with f/u prn. Treatment/Activity Tolerance:  [x] Patient able to complete treatment  [] Patient limited by fatigue  [] Patient limited by pain     [] Patient limited by other medical complications  [] Other:     Patient Education:              8/23 Educated pt on objective findings and precautions with activity level and working out with  prior to surgery. Reviewed importance of assistive device after having surgery. Educated on importance of stretches bilaterally to decrease tenderness/tightness and decrease irritation at bilateral hips prior to surgery. 9/29: precautions, increasing walking, HEP, ice vs heat  10/6: Scar tissue management/prevention of stiffness, HEP      PLAN: Updated HEP, and discussed progression of strength and flexibility with purchasing wts. .   [] Continue per plan of care [] Alter current plan (see above)  [] Plan of care initiated [x] Hold trial d/c to HEP [] Discharge       Electronically signed by:  Jose Raul Malhotra, PT, DPT, OCS, OMT-C    Note: If patient does not return for scheduled/ recommended follow up visits, this note will serve as a discharge from care along with most recent update on progress.

## 2023-01-31 ENCOUNTER — HOSPITAL ENCOUNTER (OUTPATIENT)
Dept: MAMMOGRAPHY | Age: 43
Discharge: HOME | End: 2023-01-31
Payer: COMMERCIAL

## 2023-01-31 VITALS — WEIGHT: 185 LBS | HEIGHT: 64 IN | BODY MASS INDEX: 31.58 KG/M2

## 2023-01-31 DIAGNOSIS — Z12.31 ENCOUNTER FOR SCREENING MAMMOGRAM FOR BREAST CANCER: ICD-10-CM

## 2023-01-31 PROCEDURE — 77063 BREAST TOMOSYNTHESIS BI: CPT

## 2023-02-15 DIAGNOSIS — Z96.642 S/P TOTAL LEFT HIP ARTHROPLASTY: Primary | ICD-10-CM

## 2023-02-15 RX ORDER — AMOXICILLIN AND CLAVULANATE POTASSIUM 875; 125 MG/1; MG/1
TABLET, FILM COATED ORAL
Qty: 6 TABLET | Refills: 0 | Status: SHIPPED | OUTPATIENT
Start: 2023-02-15

## 2023-04-18 ENCOUNTER — OFFICE VISIT (OUTPATIENT)
Dept: SURGERY | Age: 43
End: 2023-04-18
Payer: COMMERCIAL

## 2023-04-18 VITALS
BODY MASS INDEX: 32.98 KG/M2 | OXYGEN SATURATION: 100 % | HEART RATE: 97 BPM | WEIGHT: 193.2 LBS | HEIGHT: 64 IN | RESPIRATION RATE: 18 BRPM

## 2023-04-18 DIAGNOSIS — Z12.39 ENCOUNTER FOR SCREENING BREAST EXAMINATION: ICD-10-CM

## 2023-04-18 DIAGNOSIS — Z80.3 FAMILY HISTORY OF BREAST CANCER: ICD-10-CM

## 2023-04-18 DIAGNOSIS — Z80.0 FAMILY HISTORY OF COLON CANCER: ICD-10-CM

## 2023-04-18 DIAGNOSIS — Z91.89 AT HIGH RISK FOR BREAST CANCER: Primary | ICD-10-CM

## 2023-04-18 PROCEDURE — G8427 DOCREV CUR MEDS BY ELIG CLIN: HCPCS | Performed by: NURSE PRACTITIONER

## 2023-04-18 PROCEDURE — 1036F TOBACCO NON-USER: CPT | Performed by: NURSE PRACTITIONER

## 2023-04-18 PROCEDURE — 99204 OFFICE O/P NEW MOD 45 MIN: CPT | Performed by: NURSE PRACTITIONER

## 2023-04-18 PROCEDURE — G8417 CALC BMI ABV UP PARAM F/U: HCPCS | Performed by: NURSE PRACTITIONER

## 2023-04-18 NOTE — PATIENT INSTRUCTIONS
three levels of pressure to feel of all your breast tissue. Use light pressure to feel the tissue close to the skin surface. Use medium pressure to feel a little deeper. Use firm pressure to feel your tissue close to your breastbone and ribs. Use each pressure level to feel your breast tissue before moving on to the next spot. Check your entire breast, moving up and down as if following a strip from the collarbone to the bra line, and from the armpit to the ribs. Repeat until you have covered the entire breast.  Repeat this procedure for your left breast, using the pads of the 3 middle fingers of your right hand. To examine your breasts while in the shower:  Place one arm over your head and lightly soap your breast on that side. Using the pads of your fingers, gently move your hand over your breast (in the strip pattern described above), feeling carefully for any lumps or changes. Repeat for the other breast.  Have your doctor inspect anything you notice to see if you need further testing. Where can you learn more? Go to http://www.woods.com/ and enter P148 to learn more about \"Breast Self-Exam: Care Instructions. \"  Current as of: August 2, 2022               Content Version: 13.6  © 2006-2023 [employer]. Care instructions adapted under license by [facility] ([facility]). If you have questions about a medical condition or this instruction, always ask your healthcare professional. [place] [address] any warranty or liability for your use of this information.

## 2023-04-18 NOTE — PROGRESS NOTES
ileocecectomy    TOTAL HIP ARTHROPLASTY Left 2022    LEFT TOTAL HIP ARTHROPLASTY MINIMALLY INVASIVE DIRECT ANTERIOR performed by Radha Carias MD at [facility] OR    UPPER GASTROINTESTINAL ENDOSCOPY         Menstrual History:  Menarche age: 15    Age first live birth: N/A  Breastfeed: N/A  Premenopausal    Oral contraceptive use: yes for about 1-2 years  Hormone replacement therapy use: denies     Breast density: scattered fibroglandular density   Ashkenazi Alevism Heritage: no   Genetic testing: none      Family history significant for breast and ovarian cancer: Mother Breast Cancer (IBC) age of dx 50,  at 46  Maternal Aunt Breast Cancer age of dx 61    Maternal Grandmother Colon Cancer age of dx 66's? Maternal Grandfather Colon Cancer age of dx 63's  Paternal Grandmother Colon Cancer age of dx ? Paternal Aunt Colon Cancer age of dx ?        Family History   Problem Relation Age of Onset    Breast Cancer Mother     Cancer Mother     High Blood Pressure Father     High Cholesterol Father     Diabetes Father     Cancer Paternal Aunt         colon    Diabetes Maternal Grandmother     Cancer Maternal Grandmother         colon    Cancer Maternal Grandfather         colon    High Blood Pressure Paternal Grandmother     Cancer Paternal Grandmother         colon    Breast Cancer Maternal Aunt        Allergies as of 2023 - Fully Reviewed 2023   Allergen Reaction Noted    Ciprofloxacin Anaphylaxis 2011    Infliximab Anaphylaxis 2016    Remicade [infliximab injection] Anaphylaxis 2011    Humira [adalimumab] Other (See Comments) 2017       Social History     Tobacco Use    Smoking status: Never    Smokeless tobacco: Never   Vaping Use    Vaping Use: Never used   Substance Use Topics    Alcohol use: Yes     Comment: 1-2 DRINKS A WEEK    Drug use: Never         Current Outpatient Medications:     certolizumab pegol (CIMZIA PREFILLED) 2 X 200 MG/ML KIT injection, Inject 200 mg into

## 2023-04-19 DIAGNOSIS — Z91.89 AT HIGH RISK FOR BREAST CANCER: Primary | ICD-10-CM

## 2023-04-19 DIAGNOSIS — Z80.3 FAMILY HISTORY OF BREAST CANCER: ICD-10-CM

## 2023-07-13 ENCOUNTER — HOSPITAL ENCOUNTER (OUTPATIENT)
Dept: WOMENS IMAGING | Age: 43
Discharge: HOME | End: 2023-07-13

## 2023-07-13 NOTE — PROGRESS NOTES
The [facility]  New Visit Clinic Note      Patient Name: Masoud Uribe             YOB: 1980  MRN: [id number]  Date of Visit: 2023          Masoud Uribe was referred by MARCELLO Lamas for genetic counseling due to her family history of breast and colon cancer. Ms. Jennifer Wray was seen in the Hereditary Cancer Program at [facility] on  2023. Bert Graves, Licensed Genetic Counselor, spent 30 minutes collecting and reviewing personal and family medical information, providing genetic risk assessment, genetic counseling and psychosocial assessment. Clinical History: Masoud Uribe is a 43 y.o. female presenting for evaluation of hereditary risk for breast cancer. Overall doing well and has no breast related concerns or changes in her health. She reports a history of moderate good health. Sees rheum for akylosing spondylitis and GI for Crohn's, she is screened q 5 years with screening colonoscopy. Cancer Surveillance:   Mammogram: 23 mammo WNL  BIRADS1  Previous breast biopsies: no history of breast biopsy   Colonoscopy: Normal colonoscopy    Polyps: none  Upper endoscopy: EGD, no specific findings - normal EGD   PIPE/BSO: none       Psychosocial concerns: None specifically reported. Ms. Jennifer Wray is a pleasant [facility] [employer] with good knowledge, here for personalized risk assessment. Family History by patient report: Mother diagnosed with invasive breast cancer at age 50  Maternal aunt diagnosed with breast cancer at age 61  Maternal grandmother diagnosed with colon cancer in her 76s, alive at 80  Maternal grandfather  from colon cancer in his 62s  Paternal aunt diagnosed at age 76 with colon cancer  Paternal grandmother diagnosed with colon cancer and  at age 80    Ancestry:    Zoroastrianism ancestry: not reported     Cancer Risk Assessment and Genetic Testing:  We had a detailed discussion surrounding the concepts of

## 2023-07-29 ENCOUNTER — HOSPITAL ENCOUNTER (OUTPATIENT)
Age: 43
Discharge: HOME | End: 2023-07-29
Payer: COMMERCIAL

## 2023-07-29 ENCOUNTER — HOSPITAL ENCOUNTER (OUTPATIENT)
Dept: GENERAL RADIOLOGY | Age: 43
Discharge: HOME | End: 2023-07-29
Payer: COMMERCIAL

## 2023-07-29 DIAGNOSIS — M45.4 ANKYLOSING SPONDYLITIS OF THORACIC REGION (HCC): ICD-10-CM

## 2023-07-29 PROCEDURE — 72070 X-RAY EXAM THORAC SPINE 2VWS: CPT

## 2023-09-01 ENCOUNTER — HOSPITAL ENCOUNTER (OUTPATIENT)
Dept: MRI IMAGING | Age: 43
Discharge: HOME | End: 2023-09-01
Payer: COMMERCIAL

## 2023-09-01 DIAGNOSIS — Z80.3 FAMILY HISTORY OF BREAST CANCER: ICD-10-CM

## 2023-09-01 DIAGNOSIS — Z91.89 AT HIGH RISK FOR BREAST CANCER: ICD-10-CM

## 2023-09-01 PROCEDURE — A9577 INJ MULTIHANCE: HCPCS | Performed by: NURSE PRACTITIONER

## 2023-09-01 PROCEDURE — C8908 MRI W/O FOL W/CONT, BREAST,: HCPCS

## 2023-09-01 PROCEDURE — 6360000004 HC RX CONTRAST MEDICATION: Performed by: NURSE PRACTITIONER

## 2023-09-01 RX ADMIN — GADOBENATE DIMEGLUMINE 18 ML: 529 INJECTION, SOLUTION INTRAVENOUS at 08:17

## 2023-09-11 ENCOUNTER — TRANSCRIBE ORDERS (OUTPATIENT)
Dept: ADMINISTRATIVE | Age: 43
End: 2023-09-11

## 2023-09-11 DIAGNOSIS — M54.2 NECK PAIN: Primary | ICD-10-CM

## 2023-09-15 ENCOUNTER — OFFICE VISIT (OUTPATIENT)
Dept: ORTHOPEDIC SURGERY | Age: 43
End: 2023-09-15

## 2023-09-15 VITALS — BODY MASS INDEX: 32.95 KG/M2 | WEIGHT: 193 LBS | HEIGHT: 64 IN

## 2023-09-15 DIAGNOSIS — Z96.642 S/P TOTAL LEFT HIP ARTHROPLASTY: Primary | ICD-10-CM

## 2023-09-18 ENCOUNTER — HOSPITAL ENCOUNTER (OUTPATIENT)
Dept: MRI IMAGING | Age: 43
Discharge: HOME | End: 2023-09-18
Attending: INTERNAL MEDICINE
Payer: COMMERCIAL

## 2023-09-18 DIAGNOSIS — M54.2 NECK PAIN: ICD-10-CM

## 2023-09-18 PROCEDURE — 72141 MRI NECK SPINE W/O DYE: CPT

## 2023-10-04 ENCOUNTER — OFFICE VISIT (OUTPATIENT)
Dept: DERMATOLOGY | Age: 43
End: 2023-10-04
Payer: COMMERCIAL

## 2023-10-04 DIAGNOSIS — L82.1 SK (SEBORRHEIC KERATOSIS): ICD-10-CM

## 2023-10-04 DIAGNOSIS — D22.9 MULTIPLE NEVI: ICD-10-CM

## 2023-10-04 DIAGNOSIS — D23.72 DERMATOFIBROMA OF LEFT THIGH: ICD-10-CM

## 2023-10-04 DIAGNOSIS — L71.9 ROSACEA: Primary | ICD-10-CM

## 2023-10-04 DIAGNOSIS — L73.8 SEBACEOUS GLAND HYPERPLASIA OF FACE: ICD-10-CM

## 2023-10-04 PROCEDURE — 99213 OFFICE O/P EST LOW 20 MIN: CPT | Performed by: DERMATOLOGY

## 2023-10-04 RX ORDER — METRONIDAZOLE 7.5 MG/G
GEL TOPICAL
Qty: 45 G | Refills: 2 | Status: SHIPPED | OUTPATIENT
Start: 2023-10-04

## 2023-10-05 ENCOUNTER — OFFICE VISIT (OUTPATIENT)
Dept: SURGERY | Age: 43
End: 2023-10-05

## 2023-10-05 VITALS — DIASTOLIC BLOOD PRESSURE: 66 MMHG | BODY MASS INDEX: 34.5 KG/M2 | SYSTOLIC BLOOD PRESSURE: 137 MMHG | WEIGHT: 201 LBS

## 2023-10-05 DIAGNOSIS — K80.20 SYMPTOMATIC CHOLELITHIASIS: Primary | ICD-10-CM

## 2023-10-05 ASSESSMENT — ENCOUNTER SYMPTOMS
NAUSEA: 1
EYE DISCHARGE: 0
APNEA: 0
CHEST TIGHTNESS: 0
EYE ITCHING: 0
RECTAL PAIN: 1
BACK PAIN: 1
COLOR CHANGE: 0
ABDOMINAL DISTENTION: 1
DIARRHEA: 1

## 2023-10-05 NOTE — PROGRESS NOTES
Underexposure controls were utilized during the CT examination to meet ALARA standards for radiation dose reduction. No comparison. CT ABDOMEN:     INFERIOR THORAX: The visualized portion of the heart appears normal in size and configuration. No evidence of pericardial effusion. The lung bases demonstrate no evidence of acute airspace opacity. No evidence of pulmonary nodule. No evidence of pleural effusion. LIVER: The liver size appears normal. Normal hepatic attenuation visualized diffusely. No evidence of focal hepatic abnormality. GALLBLADDER AND BILIARY: Gallbladder is normal in size. Calcified gallstone is noted near the neck of the gallbladder. No evidence of gallbladder wall thickening. No evidence of associated inflammatory change. No pathologic intra or extrahepatic biliary distention. PANCREAS: Normal size of the pancreas visualized. Normal attenuation. No focal abnormality. SPLEEN: Normal splenic size identified. Normal density visualized. No focal abnormality appreciated. ADRENAL GLANDS: Right adrenal normal size and density. Left adrenal normal size and density. RENAL: Right kidney appears normal in size. No evidence of focal abnormality. No evidence of right hydronephrosis. Left kidney appears normal in size. Punctate calculus identified in the left kidney. No evidence of left hydronephrosis. VASCULAR: The aorta is normal in diameter. No significant atherosclerotic calcification. Inferior vena cava is normal in diameter. GASTROINTESTINAL: Stomach and duodenum appear normal in configuration. Small intestinal loops appear normal in diameter. No evidence of pathologic distention or air-fluid level. No pathologic small intestinal wall thickening. The colon is normal in configuration. No evidence of pathologic colonic wall thickening. The appendix is not confidently visualized. PERITONEUM: Normal fat attenuation. No evidence of pneumoperitoneum.  No evidence of

## 2023-10-24 ENCOUNTER — ANESTHESIA (OUTPATIENT)
Dept: OPERATING ROOM | Age: 43
End: 2023-10-24
Payer: COMMERCIAL

## 2023-10-24 ENCOUNTER — HOSPITAL ENCOUNTER (OUTPATIENT)
Age: 43
Setting detail: OUTPATIENT SURGERY
Discharge: HOME | End: 2023-10-24
Attending: SURGERY | Admitting: SURGERY
Payer: COMMERCIAL

## 2023-10-24 ENCOUNTER — ANESTHESIA EVENT (OUTPATIENT)
Dept: OPERATING ROOM | Age: 43
End: 2023-10-24
Payer: COMMERCIAL

## 2023-10-24 ENCOUNTER — APPOINTMENT (OUTPATIENT)
Dept: GENERAL RADIOLOGY | Age: 43
End: 2023-10-24
Attending: SURGERY
Payer: COMMERCIAL

## 2023-10-24 VITALS
OXYGEN SATURATION: 99 % | RESPIRATION RATE: 16 BRPM | BODY MASS INDEX: 33.94 KG/M2 | SYSTOLIC BLOOD PRESSURE: 144 MMHG | HEIGHT: 64 IN | DIASTOLIC BLOOD PRESSURE: 82 MMHG | HEART RATE: 80 BPM | WEIGHT: 198.8 LBS | TEMPERATURE: 97 F

## 2023-10-24 DIAGNOSIS — K80.20 SYMPTOMATIC CHOLELITHIASIS: ICD-10-CM

## 2023-10-24 LAB — HCG UR QL: NEGATIVE

## 2023-10-24 PROCEDURE — 7100000000 HC PACU RECOVERY - FIRST 15 MIN: Performed by: SURGERY

## 2023-10-24 PROCEDURE — 6360000002 HC RX W HCPCS

## 2023-10-24 PROCEDURE — 3700000000 HC ANESTHESIA ATTENDED CARE: Performed by: SURGERY

## 2023-10-24 PROCEDURE — 2709999900 HC NON-CHARGEABLE SUPPLY: Performed by: SURGERY

## 2023-10-24 PROCEDURE — 6370000000 HC RX 637 (ALT 250 FOR IP): Performed by: SURGERY

## 2023-10-24 PROCEDURE — 6360000002 HC RX W HCPCS: Performed by: SURGERY

## 2023-10-24 PROCEDURE — 7100000010 HC PHASE II RECOVERY - FIRST 15 MIN: Performed by: SURGERY

## 2023-10-24 PROCEDURE — 74300 X-RAY BILE DUCTS/PANCREAS: CPT

## 2023-10-24 PROCEDURE — A4217 STERILE WATER/SALINE, 500 ML: HCPCS | Performed by: SURGERY

## 2023-10-24 PROCEDURE — 6360000002 HC RX W HCPCS: Performed by: ANESTHESIOLOGY

## 2023-10-24 PROCEDURE — 7100000001 HC PACU RECOVERY - ADDTL 15 MIN: Performed by: SURGERY

## 2023-10-24 PROCEDURE — 7100000011 HC PHASE II RECOVERY - ADDTL 15 MIN: Performed by: SURGERY

## 2023-10-24 PROCEDURE — 6360000004 HC RX CONTRAST MEDICATION: Performed by: SURGERY

## 2023-10-24 PROCEDURE — 2500000003 HC RX 250 WO HCPCS

## 2023-10-24 PROCEDURE — 6370000000 HC RX 637 (ALT 250 FOR IP): Performed by: ANESTHESIOLOGY

## 2023-10-24 PROCEDURE — 3600000004 HC SURGERY LEVEL 4 BASE: Performed by: SURGERY

## 2023-10-24 PROCEDURE — 3600000014 HC SURGERY LEVEL 4 ADDTL 15MIN: Performed by: SURGERY

## 2023-10-24 PROCEDURE — 2720000010 HC SURG SUPPLY STERILE: Performed by: SURGERY

## 2023-10-24 PROCEDURE — 6370000000 HC RX 637 (ALT 250 FOR IP)

## 2023-10-24 PROCEDURE — 2580000003 HC RX 258: Performed by: SURGERY

## 2023-10-24 PROCEDURE — 84703 CHORIONIC GONADOTROPIN ASSAY: CPT

## 2023-10-24 PROCEDURE — 2580000003 HC RX 258: Performed by: ANESTHESIOLOGY

## 2023-10-24 PROCEDURE — 47563 LAPARO CHOLECYSTECTOMY/GRAPH: CPT | Performed by: SURGERY

## 2023-10-24 PROCEDURE — 88304 TISSUE EXAM BY PATHOLOGIST: CPT

## 2023-10-24 PROCEDURE — 2500000003 HC RX 250 WO HCPCS: Performed by: SURGERY

## 2023-10-24 PROCEDURE — 3700000001 HC ADD 15 MINUTES (ANESTHESIA): Performed by: SURGERY

## 2023-10-24 RX ORDER — DEXAMETHASONE SODIUM PHOSPHATE 4 MG/ML
INJECTION, SOLUTION INTRA-ARTICULAR; INTRALESIONAL; INTRAMUSCULAR; INTRAVENOUS; SOFT TISSUE PRN
Status: DISCONTINUED | OUTPATIENT
Start: 2023-10-24 | End: 2023-10-24 | Stop reason: SDUPTHER

## 2023-10-24 RX ORDER — HYDROMORPHONE HYDROCHLORIDE 2 MG/ML
0.5 INJECTION, SOLUTION INTRAMUSCULAR; INTRAVENOUS; SUBCUTANEOUS EVERY 5 MIN PRN
Status: DISCONTINUED | OUTPATIENT
Start: 2023-10-24 | End: 2023-10-24 | Stop reason: HOSPADM

## 2023-10-24 RX ORDER — SODIUM CHLORIDE 0.9 % (FLUSH) 0.9 %
5-40 SYRINGE (ML) INJECTION EVERY 12 HOURS SCHEDULED
Status: DISCONTINUED | OUTPATIENT
Start: 2023-10-24 | End: 2023-10-24 | Stop reason: HOSPADM

## 2023-10-24 RX ORDER — MAGNESIUM HYDROXIDE 1200 MG/15ML
LIQUID ORAL CONTINUOUS PRN
Status: COMPLETED | OUTPATIENT
Start: 2023-10-24 | End: 2023-10-24

## 2023-10-24 RX ORDER — METHOCARBAMOL 100 MG/ML
1000 INJECTION, SOLUTION INTRAMUSCULAR; INTRAVENOUS ONCE
Status: DISCONTINUED | OUTPATIENT
Start: 2023-10-24 | End: 2023-10-24 | Stop reason: HOSPADM

## 2023-10-24 RX ORDER — HYDROCODONE BITARTRATE AND ACETAMINOPHEN 5; 325 MG/1; MG/1
1 TABLET ORAL EVERY 4 HOURS PRN
Qty: 20 TABLET | Refills: 0 | Status: SHIPPED | OUTPATIENT
Start: 2023-10-24 | End: 2023-10-31

## 2023-10-24 RX ORDER — BUPIVACAINE HYDROCHLORIDE 5 MG/ML
INJECTION, SOLUTION EPIDURAL; INTRACAUDAL
Status: COMPLETED | OUTPATIENT
Start: 2023-10-24 | End: 2023-10-24

## 2023-10-24 RX ORDER — SODIUM CHLORIDE 9 MG/ML
INJECTION, SOLUTION INTRAVENOUS PRN
Status: DISCONTINUED | OUTPATIENT
Start: 2023-10-24 | End: 2023-10-24 | Stop reason: HOSPADM

## 2023-10-24 RX ORDER — ROCURONIUM BROMIDE 10 MG/ML
INJECTION, SOLUTION INTRAVENOUS PRN
Status: DISCONTINUED | OUTPATIENT
Start: 2023-10-24 | End: 2023-10-24 | Stop reason: SDUPTHER

## 2023-10-24 RX ORDER — FAMOTIDINE 10 MG/ML
INJECTION, SOLUTION INTRAVENOUS PRN
Status: DISCONTINUED | OUTPATIENT
Start: 2023-10-24 | End: 2023-10-24 | Stop reason: SDUPTHER

## 2023-10-24 RX ORDER — SODIUM CHLORIDE 0.9 % (FLUSH) 0.9 %
5-40 SYRINGE (ML) INJECTION PRN
Status: DISCONTINUED | OUTPATIENT
Start: 2023-10-24 | End: 2023-10-24 | Stop reason: HOSPADM

## 2023-10-24 RX ORDER — HYDROMORPHONE HYDROCHLORIDE 2 MG/ML
INJECTION, SOLUTION INTRAMUSCULAR; INTRAVENOUS; SUBCUTANEOUS PRN
Status: DISCONTINUED | OUTPATIENT
Start: 2023-10-24 | End: 2023-10-24 | Stop reason: SDUPTHER

## 2023-10-24 RX ORDER — SODIUM CHLORIDE, SODIUM LACTATE, POTASSIUM CHLORIDE, AND CALCIUM CHLORIDE .6; .31; .03; .02 G/100ML; G/100ML; G/100ML; G/100ML
IRRIGANT IRRIGATION
Status: COMPLETED | OUTPATIENT
Start: 2023-10-24 | End: 2023-10-24

## 2023-10-24 RX ORDER — MAGNESIUM SULFATE HEPTAHYDRATE 500 MG/ML
INJECTION, SOLUTION INTRAMUSCULAR; INTRAVENOUS PRN
Status: DISCONTINUED | OUTPATIENT
Start: 2023-10-24 | End: 2023-10-24 | Stop reason: SDUPTHER

## 2023-10-24 RX ORDER — IPRATROPIUM BROMIDE AND ALBUTEROL SULFATE 2.5; .5 MG/3ML; MG/3ML
SOLUTION RESPIRATORY (INHALATION)
Status: DISCONTINUED
Start: 2023-10-24 | End: 2023-10-24 | Stop reason: HOSPADM

## 2023-10-24 RX ORDER — MIDAZOLAM HYDROCHLORIDE 1 MG/ML
INJECTION INTRAMUSCULAR; INTRAVENOUS PRN
Status: DISCONTINUED | OUTPATIENT
Start: 2023-10-24 | End: 2023-10-24 | Stop reason: SDUPTHER

## 2023-10-24 RX ORDER — FENTANYL CITRATE 50 UG/ML
50 INJECTION, SOLUTION INTRAMUSCULAR; INTRAVENOUS EVERY 5 MIN PRN
Status: DISCONTINUED | OUTPATIENT
Start: 2023-10-24 | End: 2023-10-24 | Stop reason: HOSPADM

## 2023-10-24 RX ORDER — ONDANSETRON 2 MG/ML
4 INJECTION INTRAMUSCULAR; INTRAVENOUS
Status: COMPLETED | OUTPATIENT
Start: 2023-10-24 | End: 2023-10-24

## 2023-10-24 RX ORDER — MEPERIDINE HYDROCHLORIDE 25 MG/ML
12.5 INJECTION INTRAMUSCULAR; INTRAVENOUS; SUBCUTANEOUS EVERY 5 MIN PRN
Status: DISCONTINUED | OUTPATIENT
Start: 2023-10-24 | End: 2023-10-24 | Stop reason: HOSPADM

## 2023-10-24 RX ORDER — ACETAMINOPHEN 500 MG
TABLET ORAL
Status: COMPLETED
Start: 2023-10-24 | End: 2023-10-24

## 2023-10-24 RX ORDER — FENTANYL CITRATE 50 UG/ML
INJECTION, SOLUTION INTRAMUSCULAR; INTRAVENOUS PRN
Status: DISCONTINUED | OUTPATIENT
Start: 2023-10-24 | End: 2023-10-24 | Stop reason: SDUPTHER

## 2023-10-24 RX ORDER — ONDANSETRON 2 MG/ML
INJECTION INTRAMUSCULAR; INTRAVENOUS PRN
Status: DISCONTINUED | OUTPATIENT
Start: 2023-10-24 | End: 2023-10-24 | Stop reason: SDUPTHER

## 2023-10-24 RX ORDER — ACETAMINOPHEN 500 MG
1000 TABLET ORAL ONCE
Status: COMPLETED | OUTPATIENT
Start: 2023-10-24 | End: 2023-10-24

## 2023-10-24 RX ORDER — PROPOFOL 10 MG/ML
INJECTION, EMULSION INTRAVENOUS PRN
Status: DISCONTINUED | OUTPATIENT
Start: 2023-10-24 | End: 2023-10-24 | Stop reason: SDUPTHER

## 2023-10-24 RX ORDER — OXYCODONE HYDROCHLORIDE 5 MG/1
5 TABLET ORAL PRN
Status: COMPLETED | OUTPATIENT
Start: 2023-10-24 | End: 2023-10-24

## 2023-10-24 RX ORDER — OXYCODONE HYDROCHLORIDE 5 MG/1
10 TABLET ORAL PRN
Status: COMPLETED | OUTPATIENT
Start: 2023-10-24 | End: 2023-10-24

## 2023-10-24 RX ORDER — LIDOCAINE HYDROCHLORIDE 20 MG/ML
INJECTION, SOLUTION EPIDURAL; INFILTRATION; INTRACAUDAL; PERINEURAL PRN
Status: DISCONTINUED | OUTPATIENT
Start: 2023-10-24 | End: 2023-10-24 | Stop reason: SDUPTHER

## 2023-10-24 RX ADMIN — HYDROMORPHONE HYDROCHLORIDE 0.5 MG: 2 INJECTION, SOLUTION INTRAMUSCULAR; INTRAVENOUS; SUBCUTANEOUS at 10:04

## 2023-10-24 RX ADMIN — FAMOTIDINE 20 MG: 10 INJECTION INTRAVENOUS at 09:25

## 2023-10-24 RX ADMIN — DEXAMETHASONE SODIUM PHOSPHATE 8 MG: 4 INJECTION, SOLUTION INTRAMUSCULAR; INTRAVENOUS at 09:39

## 2023-10-24 RX ADMIN — ONDANSETRON 4 MG: 2 INJECTION INTRAMUSCULAR; INTRAVENOUS at 10:40

## 2023-10-24 RX ADMIN — HYDROMORPHONE HYDROCHLORIDE 0.5 MG: 2 INJECTION, SOLUTION INTRAMUSCULAR; INTRAVENOUS; SUBCUTANEOUS at 10:50

## 2023-10-24 RX ADMIN — OXYCODONE HYDROCHLORIDE 5 MG: 5 TABLET ORAL at 13:33

## 2023-10-24 RX ADMIN — HYDROMORPHONE HYDROCHLORIDE 0.5 MG: 2 INJECTION, SOLUTION INTRAMUSCULAR; INTRAVENOUS; SUBCUTANEOUS at 10:42

## 2023-10-24 RX ADMIN — SODIUM CHLORIDE: 9 INJECTION, SOLUTION INTRAVENOUS at 08:58

## 2023-10-24 RX ADMIN — HYDROMORPHONE HYDROCHLORIDE 0.5 MG: 2 INJECTION, SOLUTION INTRAMUSCULAR; INTRAVENOUS; SUBCUTANEOUS at 10:10

## 2023-10-24 RX ADMIN — ACETAMINOPHEN 1000 MG: 500 TABLET ORAL at 08:48

## 2023-10-24 RX ADMIN — Medication 1000 MG: at 08:48

## 2023-10-24 RX ADMIN — CEFAZOLIN 2000 MG: 2 INJECTION, POWDER, FOR SOLUTION INTRAMUSCULAR; INTRAVENOUS at 09:21

## 2023-10-24 RX ADMIN — MIDAZOLAM 1 MG: 1 INJECTION INTRAMUSCULAR; INTRAVENOUS at 09:32

## 2023-10-24 RX ADMIN — ROCURONIUM BROMIDE 50 MG: 10 INJECTION, SOLUTION INTRAVENOUS at 09:33

## 2023-10-24 RX ADMIN — FENTANYL CITRATE 50 MCG: 50 INJECTION, SOLUTION INTRAMUSCULAR; INTRAVENOUS at 09:58

## 2023-10-24 RX ADMIN — ONDANSETRON 4 MG: 2 INJECTION INTRAMUSCULAR; INTRAVENOUS at 12:33

## 2023-10-24 RX ADMIN — FENTANYL CITRATE 50 MCG: 50 INJECTION, SOLUTION INTRAMUSCULAR; INTRAVENOUS at 09:32

## 2023-10-24 RX ADMIN — LIDOCAINE HYDROCHLORIDE 100 MG: 20 INJECTION, SOLUTION EPIDURAL; INFILTRATION; INTRACAUDAL; PERINEURAL at 09:33

## 2023-10-24 RX ADMIN — MAGNESIUM SULFATE HEPTAHYDRATE 1 G: 500 INJECTION, SOLUTION INTRAMUSCULAR; INTRAVENOUS at 09:41

## 2023-10-24 RX ADMIN — FENTANYL CITRATE 25 MCG: 50 INJECTION, SOLUTION INTRAMUSCULAR; INTRAVENOUS at 12:40

## 2023-10-24 RX ADMIN — MIDAZOLAM 1 MG: 1 INJECTION INTRAMUSCULAR; INTRAVENOUS at 09:28

## 2023-10-24 RX ADMIN — PROPOFOL 150 MG: 10 INJECTION, EMULSION INTRAVENOUS at 09:33

## 2023-10-24 RX ADMIN — SUGAMMADEX 200 MG: 100 INJECTION, SOLUTION INTRAVENOUS at 10:41

## 2023-10-24 ASSESSMENT — PAIN SCALES - GENERAL
PAINLEVEL_OUTOF10: 5
PAINLEVEL_OUTOF10: 6
PAINLEVEL_OUTOF10: 5

## 2023-10-24 ASSESSMENT — LIFESTYLE VARIABLES: SMOKING_STATUS: 0

## 2023-10-24 ASSESSMENT — PAIN DESCRIPTION - LOCATION: LOCATION: ABDOMEN

## 2023-10-24 ASSESSMENT — PAIN DESCRIPTION - PAIN TYPE: TYPE: SURGICAL PAIN

## 2023-10-24 NOTE — PROGRESS NOTES
VSS, no pain, incisions intact, tolerating po, seen by anesthesia,meets criteria for phase 1 discharge

## 2023-10-24 NOTE — PROGRESS NOTES
Pt on unit from pacu report received at bedside. Pt stated 5 on pain scale sites x3 to ABD CDI ice pack in place. VSS call light in reach family at bedside.

## 2023-10-24 NOTE — H&P
[facility]ar[facility]      I have reviewed the history and physical and examined the patient and find no relevant changes. I have reviewed with the patient and/or family the risks, benefits, and alternatives to the procedure.     Darby Alba MD  10/24/2023

## 2023-10-24 NOTE — BRIEF OP NOTE
Brief Postoperative Note      Patient: Ophelia Tran  YOB: 1980  MRN: [id number]    Date of Procedure: 10/24/2023    Pre-Op Diagnosis Codes: * Symptomatic cholelithiasis [K80.20]    Post-Op Diagnosis: Same       Procedure(s):  LAPAROSCOPIC CHOLECYSTECTOMY WITH CHOLANGIOGRAM    Surgeon(s):  Con Baker MD    Assistant:  First Assistant: Christiano Shoemaker    Anesthesia: General    Estimated Blood Loss (mL): less than 50     Complications: None    Specimens:   ID Type Source Tests Collected by Time Destination   A : gall bladder   Tissue Gallbladder SURGICAL PATHOLOGY Con Baker MD 10/24/2023 0908        Implants:  * No implants in log *      Drains:   NG/OG/NJ/NE Tube Orogastric 18 fr Center mouth (Active)       Findings: Chronic cholecystitis. GS obstructing cystic duct removed, IOC clear.       Electronically signed by Darshan Little MD on 10/24/2023 at 10:49 AM

## 2023-10-24 NOTE — ANESTHESIA POSTPROCEDURE EVALUATION
Department of Anesthesiology  Postprocedure Note    Patient: Aneudy Urias  MRN: [id number]  YOB: 1980  Date of evaluation: 10/24/2023      Procedure Summary     Date: 10/24/23 Room / Location: [address]    Anesthesia Start: 1062 Anesthesia Stop: 1104    Procedure: LAPAROSCOPIC CHOLECYSTECTOMY WITH CHOLANGIOGRAM (Abdomen) Diagnosis:       Symptomatic cholelithiasis      (Symptomatic cholelithiasis [K80.20])    Surgeons: Sabine Kong MD Responsible Provider: Nichole Du MD    Anesthesia Type: General ASA Status: 3          Anesthesia Type: General    Monica Phase I: Monica Score: 7    Monica Phase II:        Anesthesia Post Evaluation    Patient location during evaluation: PACU  Patient participation: complete - patient participated  Level of consciousness: awake and alert and sleepy but conscious  Pain score: 0  Airway patency: patent  Nausea & Vomiting: no nausea  Complications: no  Cardiovascular status: blood pressure returned to baseline  Respiratory status: acceptable  Hydration status: euvolemic  Comments: Sleep but responsive in pacu.    Multimodal analgesia pain management approach  Pain management: adequate

## 2023-10-24 NOTE — DISCHARGE INSTRUCTIONS
Robbi Sauceda M.D.    [phone]                                                     [address] [place] [address]., [address] [facility], [address] [place] [address]      POST-OPERATIVE INSTRUCTIONS FOR GALLBLADDER SURGERY    Call the office to schedule your post-operative appointment with your surgeon for two (2) weeks. You will have surgical glue closing your incisions. Your incisions are waterproof. You may shower. Wash incisions gently, and pat them dry. Do not rub your incisions. General guidelines for activity:   Avoid strenuous activity or lifting anything heavier than 15 pounds. It is OK to be up  walking around, and walking up and down stairs. Do what is comfortable: stop and rest when you feel tired. Drink plenty of fluids and stay on a bland diet for 2-3 days after surgery. Do not drive for three days and while taking your narcotic pain medicine. Watch for signs of infection:  Excessive warmth or bright redness around your incisions  Leakage of bloody or cloudy fluid from you incisions  Fever over 100.5    During the laparoscopic procedure that you had, gas is pumped into the abdominal cavity. You may feel abdominal, shoulder, or rib pain for a few days due to this gas. You will have pain medicine ordered. Take as directed    If you experience constipation:  Increase your water intake  Increase your activity, walking is best.  A stool softener or mild laxative may be necessary if you still have not had a bowel movement ; call the office for further instructions. SEDATION DISCHARGE INSTRUCTIONS    10/24/2023     Wear your seatbelt home. You are under the influence of drugs. Do not drink alcohol, drive, operate machinery, or make any important decisions or sign any legal documents for 24 hours  A responsible adult needs to be with you for 24 hours.   You may experience

## 2023-10-25 NOTE — OP NOTE
and the camera was  placed in this site away from the adhesions from the prior surgery. Epigastric 11-mm port and one additional right abdominal 5-mm ports were  placed under direct vision. The gallbladder was identified, retracted  cephalad at the fundus. The infundibulum area was dissected free of  adhesions, retracted inferolaterally to the right. The [place] of  [place] was dissected. The bilateral peritoneal leaflets of the  gallbladder were opened to allow to be visualized. There was some  chronic inflammatory change and oozing in this area. The cystic artery  was clipped and an additional side branch of the artery was also  clipped. There was some oozing from the gallbladder bed in this area as  well as from the cystic artery, which was all controlled at this time. The cystic gallbladder junction was then dissected cleanly and  circumferentially with a wide window created for the critical angle  view. A partial transection of the cystic duct gallbladder junction was  then performed and the cholangiogram catheter was passed through its  Angiocath in the intra-abdominal wall and the cholangiogram was  obtained. This appeared normal.  The cholangiogram catheter was removed  and the cystic duct was clipped with three clips proximally and  transection of the duct was completed. The gallbladder was removed off  the hepatic fossa with Bovie electrocautery, placed in specimen  retrieval sac and then removed out of the abdomen through the epigastric  11-mm port site within the bag. No stones or bile were spilled  intra-abdominally. The perihepatic area was irrigated and aspirated  dry. The gallbladder bed appeared hemostatic. The clips were all  checked. There was no bleeding or bile leak noted. This area was then  packed with Surgicel gauze in the area of the parul hepatis and in the  gallbladder bed itself.   The remaining irrigant fluid was aspirated and  suctioned out from her liver and the

## 2023-10-27 ENCOUNTER — PROCEDURE VISIT (OUTPATIENT)
Dept: DERMATOLOGY | Age: 43
End: 2023-10-27

## 2023-10-27 DIAGNOSIS — L73.8 SEBACEOUS GLAND HYPERPLASIA OF FACE: Primary | ICD-10-CM

## 2023-10-27 RX ORDER — TRIAMCINOLONE ACETONIDE 1 MG/G
CREAM TOPICAL
Qty: 30 G | Refills: 2 | Status: SHIPPED | OUTPATIENT
Start: 2023-10-27

## 2023-10-27 NOTE — PROGRESS NOTES
[address] [facility] Dermatology  Jovanna Graves MD  [phone]      Joseph Gloss  1980    43 y.o. female     Date of Visit: 10/27/2023    Chief Complaint: skin lesions    History of Present Illness:    She is here today for elective treatment of sebaceous gland hyperplasia of the face. Review of Systems:  Gen: Feels well, good sense of health. Past Medical History, Family History, Surgical History, Medications and Allergies reviewed. Past Medical History:   Diagnosis Date    Ankylosing spondylitis ([facility])     Arthritis     Crohn's     Stotz    Fistula 11/2011    RECTAL    GERD (gastroesophageal reflux disease)     Iritis 2010    Microcytic anemia     Umbilical hernia     Wears glasses      Past Surgical History:   Procedure Laterality Date    ABSCESS DRAINAGE  09/01/2011    RECTAL    APPENDECTOMY      CHOLECYSTECTOMY, LAPAROSCOPIC N/A 10/24/2023    LAPAROSCOPIC CHOLECYSTECTOMY WITH CHOLANGIOGRAM performed by Darnella Bumpers, MD at [facility]  11/21/2011    COLONOSCOPY  12/01/2011    JOINT REPLACEMENT Right     HIP    OTHER SURGICAL HISTORY  01/01/2007    Terminal ileum resection     RECTAL SURGERY  07/31/2012    RECTAL ADVANCEMENT FLAP    SIGMOIDOSCOPY  09/12/2012    FLEXIBLE    SIGMOIDOSCOPY  6/7/2017    flexible sigmoidoscopy    SMALL INTESTINE SURGERY      ileocecectomy    TOTAL HIP ARTHROPLASTY Left 9/7/2022    LEFT TOTAL HIP ARTHROPLASTY MINIMALLY INVASIVE DIRECT ANTERIOR performed by Cassie Rich MD at [address] ENDOSCOPY         Allergies   Allergen Reactions    Ciprofloxacin Anaphylaxis    Infliximab Anaphylaxis    Remicade [Infliximab Injection] Anaphylaxis     Outpatient Medications Marked as Taking for the 10/27/23 encounter (Procedure visit) with Nay Vargas MD   Medication Sig Dispense Refill    triamcinolone (KENALOG) 0.1 % cream Apply to affected area twice daily for up to 2 weeks or until improved.  30 g 2

## 2023-11-07 ENCOUNTER — OFFICE VISIT (OUTPATIENT)
Dept: SURGERY | Age: 43
End: 2023-11-07

## 2023-11-07 VITALS — DIASTOLIC BLOOD PRESSURE: 73 MMHG | WEIGHT: 198 LBS | BODY MASS INDEX: 33.99 KG/M2 | SYSTOLIC BLOOD PRESSURE: 122 MMHG

## 2023-11-07 DIAGNOSIS — K80.20 SYMPTOMATIC CHOLELITHIASIS: Primary | ICD-10-CM

## 2023-11-07 PROCEDURE — 99024 POSTOP FOLLOW-UP VISIT: CPT | Performed by: SURGERY

## 2024-02-29 ENCOUNTER — HOSPITAL ENCOUNTER (OUTPATIENT)
Dept: MAMMOGRAPHY | Age: 44
Discharge: HOME | End: 2024-02-29
Payer: COMMERCIAL

## 2024-02-29 VITALS — WEIGHT: 186 LBS | BODY MASS INDEX: 31.76 KG/M2 | HEIGHT: 64 IN

## 2024-02-29 DIAGNOSIS — Z91.89 AT HIGH RISK FOR BREAST CANCER: ICD-10-CM

## 2024-02-29 DIAGNOSIS — Z80.3 FAMILY HISTORY OF BREAST CANCER: ICD-10-CM

## 2024-02-29 PROCEDURE — 77063 BREAST TOMOSYNTHESIS BI: CPT

## 2024-03-12 ENCOUNTER — OFFICE VISIT (OUTPATIENT)
Dept: SURGERY | Age: 44
End: 2024-03-12
Payer: COMMERCIAL

## 2024-03-12 VITALS
BODY MASS INDEX: 31.99 KG/M2 | HEIGHT: 64 IN | HEART RATE: 101 BPM | RESPIRATION RATE: 18 BRPM | OXYGEN SATURATION: 98 % | WEIGHT: 187.4 LBS

## 2024-03-12 DIAGNOSIS — Z12.31 ENCOUNTER FOR SCREENING MAMMOGRAM FOR BREAST CANCER: ICD-10-CM

## 2024-03-12 DIAGNOSIS — Z80.0 FAMILY HISTORY OF COLON CANCER: ICD-10-CM

## 2024-03-12 DIAGNOSIS — Z80.3 FAMILY HISTORY OF BREAST CANCER: ICD-10-CM

## 2024-03-12 DIAGNOSIS — Z91.89 AT HIGH RISK FOR BREAST CANCER: ICD-10-CM

## 2024-03-12 DIAGNOSIS — Z12.39 ENCOUNTER FOR SCREENING BREAST EXAMINATION: Primary | ICD-10-CM

## 2024-03-12 PROCEDURE — 99214 OFFICE O/P EST MOD 30 MIN: CPT | Performed by: NURSE PRACTITIONER

## 2024-03-12 RX ORDER — SEMAGLUTIDE 1 MG/.5ML
INJECTION, SOLUTION SUBCUTANEOUS
COMMUNITY
Start: 2023-11-21

## 2024-03-12 NOTE — PROGRESS NOTES
[place]   Surgical Breast Oncology     Primary Care Provider: Edie Lowery MD    CC: High Risk Screening for Breast Cancer      HPI:  Sandy Khalil is a 43 y.o. woman here for routine follow up for high risk screening for breast cancer.  Overall doing well and has no breast related concerns or changes in her health.  She states that she does perform routine self breast evaluations and has not noticed any new abnormalities such as masses, skin changes, color changes,nipple discharge, or changes to the nipple-areolar complex.      Family cancer history is significant for breast cancer and colon cancer.   Underwent genetic testing [employer] 7/13/2023- no pathogenic mutation identified in the 36 genes analyzed.  No variants of unknown significance identified.      She has not had a breast biopsy or breast problems in the past.        Diet: regular, limited fatty foods  Alcohol: socially  Exercise: yes  Sleep: 6-8 hours  Caffeine: daily energy drink  Follows with gyn annually, follows with derm, follows with GI routinely for Crohn's and has routine colonoscopies ~5y  PA at [facility]    INTERVAL HISTORY:  Bilateral screening mammogram 1/31/2023:  No new concerning findings suggestive of malignancy.  BI-RADS1.    Bilateral breast MRI 9/1/2023:  No concerning findings suggestive of malignancy.  BI-RADS1.      Bilateral screening mammogram 2/29/2024:  No new concerning findings suggestive of malignancy.  BI-RADS1.           Past Medical History:   Diagnosis Date    Ankylosing spondylitis (HCC)     Arthritis     Crohn's     Stotz    Fistula 11/2011    RECTAL    GERD (gastroesophageal reflux disease)     Iritis 2010    Microcytic anemia     Umbilical hernia     Wears glasses        Past Surgical History:   Procedure Laterality Date    ABSCESS DRAINAGE  09/01/2011    RECTAL    APPENDECTOMY      CHOLECYSTECTOMY, LAPAROSCOPIC N/A 10/24/2023    LAPAROSCOPIC CHOLECYSTECTOMY WITH CHOLANGIOGRAM performed by

## 2024-05-16 ENCOUNTER — HOSPITAL ENCOUNTER (OUTPATIENT)
Age: 44
Discharge: HOME | End: 2024-05-16
Payer: COMMERCIAL

## 2024-05-16 PROCEDURE — 86480 TB TEST CELL IMMUN MEASURE: CPT

## 2024-05-19 LAB
GAMMA INTERFERON BACKGROUND BLD IA-ACNC: 0.03 IU/ML
MITOGEN IGNF BCKGRD COR BLD-ACNC: >10 IU/ML
QUANTI TB GOLD PLUS: NEGATIVE
QUANTI TB1 MINUS NIL: 0 IU/ML (ref 0–0.34)
QUANTI TB2 MINUS NIL: 0 IU/ML (ref 0–0.34)

## 2024-08-27 ENCOUNTER — HOSPITAL ENCOUNTER (OUTPATIENT)
Dept: MRI IMAGING | Age: 44
Discharge: HOME | End: 2024-08-27
Attending: INTERNAL MEDICINE
Payer: COMMERCIAL

## 2024-08-27 DIAGNOSIS — M45.9 ANKYLOSING SPONDYLITIS OF SITE IN SPINE (HCC): ICD-10-CM

## 2024-08-27 PROCEDURE — 72146 MRI CHEST SPINE W/O DYE: CPT

## 2024-09-10 ENCOUNTER — HOSPITAL ENCOUNTER (OUTPATIENT)
Dept: MRI IMAGING | Age: 44
Discharge: HOME | End: 2024-09-10
Payer: COMMERCIAL

## 2024-09-10 DIAGNOSIS — Z80.0 FAMILY HISTORY OF COLON CANCER: ICD-10-CM

## 2024-09-10 DIAGNOSIS — Z91.89 AT HIGH RISK FOR BREAST CANCER: ICD-10-CM

## 2024-09-10 DIAGNOSIS — Z80.3 FAMILY HISTORY OF BREAST CANCER: ICD-10-CM

## 2024-09-10 DIAGNOSIS — Z12.39 ENCOUNTER FOR SCREENING BREAST EXAMINATION: ICD-10-CM

## 2024-09-10 DIAGNOSIS — Z12.31 ENCOUNTER FOR SCREENING MAMMOGRAM FOR BREAST CANCER: ICD-10-CM

## 2024-09-10 PROCEDURE — A9577 INJ MULTIHANCE: HCPCS | Performed by: NURSE PRACTITIONER

## 2024-09-10 PROCEDURE — 6360000004 HC RX CONTRAST MEDICATION: Performed by: NURSE PRACTITIONER

## 2024-09-10 PROCEDURE — C8908 MRI W/O FOL W/CONT, BREAST,: HCPCS

## 2024-09-10 PROCEDURE — 2580000003 HC RX 258: Performed by: NURSE PRACTITIONER

## 2024-09-10 RX ORDER — SODIUM CHLORIDE 0.9 % (FLUSH) 0.9 %
5-40 SYRINGE (ML) INJECTION 2 TIMES DAILY
Status: DISCONTINUED | OUTPATIENT
Start: 2024-09-10 | End: 2024-09-11 | Stop reason: HOSPADM

## 2024-09-10 RX ADMIN — Medication 20 ML: at 09:33

## 2024-09-10 RX ADMIN — GADOBENATE DIMEGLUMINE 16 ML: 529 INJECTION, SOLUTION INTRAVENOUS at 09:32

## 2024-10-10 ENCOUNTER — OFFICE VISIT (OUTPATIENT)
Dept: DERMATOLOGY | Age: 44
End: 2024-10-10
Payer: COMMERCIAL

## 2024-10-10 DIAGNOSIS — D22.9 MULTIPLE NEVI: ICD-10-CM

## 2024-10-10 DIAGNOSIS — L30.9 DERMATITIS: ICD-10-CM

## 2024-10-10 DIAGNOSIS — L71.9 ROSACEA: Primary | ICD-10-CM

## 2024-10-10 DIAGNOSIS — L91.8 SKIN TAG: ICD-10-CM

## 2024-10-10 DIAGNOSIS — L82.1 SK (SEBORRHEIC KERATOSIS): ICD-10-CM

## 2024-10-10 PROCEDURE — 99213 OFFICE O/P EST LOW 20 MIN: CPT | Performed by: DERMATOLOGY

## 2024-10-10 PROCEDURE — 11200 RMVL SKIN TAGS UP TO&INC 15: CPT | Performed by: DERMATOLOGY

## 2024-10-10 RX ORDER — HYDROCORTISONE 25 MG/G
OINTMENT TOPICAL
Qty: 20 G | Refills: 1 | Status: SHIPPED | OUTPATIENT
Start: 2024-10-10 | End: 2024-10-17

## 2024-10-10 NOTE — PROGRESS NOTES
ANTERIOR performed by Jose Luis Larose MD at [facility] OR    UPPER GASTROINTESTINAL ENDOSCOPY         Allergies   Allergen Reactions    Ciprofloxacin Anaphylaxis    Infliximab Anaphylaxis    Remicade [Infliximab Injection] Anaphylaxis     Outpatient Medications Marked as Taking for the 10/10/24 encounter (Office Visit) with Dustin Woods MD   Medication Sig Dispense Refill    hydrocortisone 2.5 % ointment Apply to affected areas twice daily until improved. 20 g 1    WEGOVY 1 MG/0.5ML SOAJ SC injection INJECT 1 MG UNDER THE SKIN ONCE WEEKLY *MED NOT COVERED ON INS*      metroNIDAZOLE (METROGEL) 0.75 % gel Apply to the face 2 times daily for rosacea. 45 g 2    certolizumab pegol (CIMZIA PREFILLED) 2 X 200 MG/ML KIT injection Inject 200 mg into the skin every 14 days (Patient taking differently: Inject 200 mg into the skin every 14 days 400 mg every other week) 1 kit 11    EPINEPHrine (EPIPEN) 0.3 MG/0.3ML LOBO injection Inject 0.3 mLs into the muscle once as needed for 1 dose. 1 Device 3         Physical Examination     Full skin examination except for the skin below the underwear.    Well appearing.    1.  Cheeks with few erythematous papules.     2.  Trunk and extremities with multiple well defined round to oval smooth brown macules and papules.     3.   Back with 2 stuck on appearing verrucous tan papules.     4.  Right nipple with a pedunculated pink tan papule.     5.  Vulva clear today.        Assessment and Plan     1. Rosacea-mild papulopustular, overall well-controlled    Resume metronidazole 0.75% gel twice daily.      2. Multiple nevi - benign appearing    Sun protective behaviors, including use of at least SPF 30 sunscreen, and self skin examinations were encouraged.  Call for any new or concerning lesions.       3. SK (seborrheic keratosis)     Reassurance.      4. Skin tag, of the right nipple-mildly inflamed    Cryotherapy was discussed and patient agreed to proceed.  Consent was obtained.  1 lesions were

## 2025-03-10 ENCOUNTER — HOSPITAL ENCOUNTER (OUTPATIENT)
Dept: WOMENS IMAGING | Age: 45
Discharge: HOME | End: 2025-03-10
Payer: COMMERCIAL

## 2025-03-10 VITALS — WEIGHT: 174 LBS | HEIGHT: 64 IN | BODY MASS INDEX: 29.71 KG/M2

## 2025-03-10 DIAGNOSIS — Z91.89 AT HIGH RISK FOR BREAST CANCER: ICD-10-CM

## 2025-03-10 DIAGNOSIS — Z12.39 ENCOUNTER FOR SCREENING BREAST EXAMINATION: ICD-10-CM

## 2025-03-10 DIAGNOSIS — Z80.3 FAMILY HISTORY OF BREAST CANCER: ICD-10-CM

## 2025-03-10 PROCEDURE — 77067 SCR MAMMO BI INCL CAD: CPT

## 2025-03-11 ENCOUNTER — RESULTS FOLLOW-UP (OUTPATIENT)
Dept: WOMENS IMAGING | Age: 45
End: 2025-03-11

## 2025-03-13 ENCOUNTER — PATIENT MESSAGE (OUTPATIENT)
Age: 45
End: 2025-03-13

## 2025-03-13 RX ORDER — METRONIDAZOLE 7.5 MG/G
GEL TOPICAL
Qty: 45 G | Refills: 2 | Status: SHIPPED | OUTPATIENT
Start: 2025-03-13

## 2025-03-18 ENCOUNTER — OFFICE VISIT (OUTPATIENT)
Dept: SURGERY | Age: 45
End: 2025-03-18
Payer: COMMERCIAL

## 2025-03-18 VITALS
RESPIRATION RATE: 16 BRPM | HEART RATE: 96 BPM | HEIGHT: 64 IN | WEIGHT: 177.6 LBS | OXYGEN SATURATION: 98 % | BODY MASS INDEX: 30.32 KG/M2

## 2025-03-18 DIAGNOSIS — Z80.3 FAMILY HISTORY OF BREAST CANCER: ICD-10-CM

## 2025-03-18 DIAGNOSIS — Z12.39 ENCOUNTER FOR SCREENING BREAST EXAMINATION: ICD-10-CM

## 2025-03-18 DIAGNOSIS — R92.30 DENSE BREAST TISSUE: ICD-10-CM

## 2025-03-18 DIAGNOSIS — Z12.31 ENCOUNTER FOR SCREENING MAMMOGRAM FOR BREAST CANCER: ICD-10-CM

## 2025-03-18 DIAGNOSIS — Z12.39 BREAST CANCER SCREENING, HIGH RISK PATIENT: ICD-10-CM

## 2025-03-18 DIAGNOSIS — Z91.89 AT HIGH RISK FOR BREAST CANCER: Primary | ICD-10-CM

## 2025-03-18 DIAGNOSIS — R92.30 DENSE BREAST TISSUE: Primary | ICD-10-CM

## 2025-03-18 DIAGNOSIS — Z91.89 AT HIGH RISK FOR BREAST CANCER: ICD-10-CM

## 2025-03-18 PROCEDURE — 99214 OFFICE O/P EST MOD 30 MIN: CPT | Performed by: NURSE PRACTITIONER

## 2025-03-18 NOTE — PROGRESS NOTES
[facility]   Surgical Breast Oncology     Primary Care Provider: Edie Lowery MD    CC: High Risk Screening for Breast Cancer      HPI:  Sandy Khalil is a 44 y.o. woman here for routine follow up for high risk screening for breast cancer.  Overall doing well and has no breast related concerns or changes in her health.  She states that she does perform routine self breast evaluations and has not noticed any new abnormalities such as masses, skin changes, color changes, nipple discharge, or changes to the nipple-areolar complex.      Family cancer history is significant for breast cancer and colon cancer.   Underwent genetic testing [employer] 7/13/2023- no pathogenic mutation identified in the 36 genes analyzed.  No variants of unknown significance identified.      She has not had a breast biopsy or breast problems in the past.        Diet: regular, limited fatty foods  Alcohol: socially  Exercise: yes  Sleep: 6-8 hours  Caffeine: daily energy drink  Follows with gyn annually, follows with derm, follows with GI routinely for Crohn's and has routine colonoscopies ~5y  PA at [facility]    INTERVAL HISTORY:  Bilateral screening mammogram 1/31/2023:  No new concerning findings suggestive of malignancy.  BI-RADS1.    Bilateral breast MRI 9/1/2023:  No concerning findings suggestive of malignancy.  BI-RADS1.      Bilateral screening mammogram 2/29/2024:  No new concerning findings suggestive of malignancy.  BI-RADS1.    Bilateral breast MRI 9/10/2024:  1 cm nonenhancing oval mass in the retroareolar region which corresponds to a mammographically stable finding.  No new concerning findings suggestive of malignancy.  BI-RADS2.      Bilateral screening mammogram 3/10/2025:  No new concerning findings suggestive of malignancy.  BI-RADS1.         Past Medical History:   Diagnosis Date    Ankylosing spondylitis (HCC)     Arthritis     BRCA1 negative     BRCA2 negative     Crohn's     Stotz    Fistula 11/2011

## 2025-06-17 ENCOUNTER — TRANSCRIBE ORDERS (OUTPATIENT)
Dept: ADMINISTRATIVE | Age: 45
End: 2025-06-17

## 2025-06-17 DIAGNOSIS — R06.02 SHORTNESS OF BREATH: Primary | ICD-10-CM

## 2025-06-18 ENCOUNTER — HOSPITAL ENCOUNTER (OUTPATIENT)
Dept: PULMONOLOGY | Age: 45
Discharge: HOME | End: 2025-06-18
Attending: INTERNAL MEDICINE
Payer: COMMERCIAL

## 2025-06-18 DIAGNOSIS — R06.02 SHORTNESS OF BREATH: ICD-10-CM

## 2025-06-18 PROCEDURE — 94060 EVALUATION OF WHEEZING: CPT

## 2025-06-18 PROCEDURE — 94726 PLETHYSMOGRAPHY LUNG VOLUMES: CPT

## 2025-06-18 PROCEDURE — 94760 N-INVAS EAR/PLS OXIMETRY 1: CPT

## 2025-06-18 PROCEDURE — 94729 DIFFUSING CAPACITY: CPT

## 2025-06-18 PROCEDURE — 94664 DEMO&/EVAL PT USE INHALER: CPT

## 2025-06-18 PROCEDURE — 6360000002 HC RX W HCPCS: Performed by: INTERNAL MEDICINE

## 2025-06-18 RX ORDER — ALBUTEROL SULFATE 0.83 MG/ML
2.5 SOLUTION RESPIRATORY (INHALATION) ONCE
Status: COMPLETED | OUTPATIENT
Start: 2025-06-18 | End: 2025-06-18

## 2025-06-18 RX ADMIN — ALBUTEROL SULFATE 2.5 MG: 2.5 SOLUTION RESPIRATORY (INHALATION) at 10:18

## 2025-06-20 PROBLEM — R06.02 SHORTNESS OF BREATH: Status: ACTIVE | Noted: 2025-06-20

## 2025-06-20 NOTE — PROCEDURES
[address] [address]                           PULMONARY FUNCTION      PATIENT NAME: YOMI ROGER             : 1980  MED REC NO: [id number]                      ROOM:   ACCOUNT NO: [id number]                       ADMIT DATE: 2025  PROVIDER: Demond Jordan MD      DATE OF PROCEDURE: 2025    SURGEON:  Demond Jordan MD    REFERRING PHYSICIAN:  MARGARITA FIGUEREDO    Forced vital capacity 3.13 L, and FEV1 of 2.68 L, both in normal range.  FEV1 to FVC ratio is 86%, normal.  No response to inhaled bronchodilators.  Lung volume determinations by plethysmography show normal total lung capacity, FRC, and residual volume.  Single-breath diffusion capacity is normal.    IMPRESSION:  Normal pulmonary function study.          DEMOND JORDAN MD      D:  2025 12:02:27     T:  2025 12:16:34     JAVON/CAIO  Job #:  [id number]     Doc#:  [id number]

## 2025-07-26 ENCOUNTER — PATIENT MESSAGE (OUTPATIENT)
Age: 45
End: 2025-07-26

## 2025-07-30 ENCOUNTER — OFFICE VISIT (OUTPATIENT)
Age: 45
End: 2025-07-30
Payer: COMMERCIAL

## 2025-07-30 DIAGNOSIS — L23.7 PHYTOPHOTODERMATITIS: Primary | ICD-10-CM

## 2025-07-30 PROCEDURE — 99213 OFFICE O/P EST LOW 20 MIN: CPT | Performed by: DERMATOLOGY

## 2025-07-30 RX ORDER — TRIAMCINOLONE ACETONIDE 1 MG/G
CREAM TOPICAL
Qty: 30 G | Refills: 0 | Status: SHIPPED | OUTPATIENT
Start: 2025-07-30

## 2025-07-30 NOTE — PROGRESS NOTES
[facility] Dermatology  Dustin Woods MD  [phone]      Sandy Khalil  1980    44 y.o. female     Date of Visit: 7/30/2025    Chief Complaint: rash    History of Present Illness:    1.  She presents today for new onset rash on the left forearm.  It appeared several days to week after sticking her hand into a zucchini bush.  She initially developed some reddish lesions that have now become hyperpigmented.    On Cimzia for Crohn's and ankylosing spondylitis.       Review of Systems:  Gen: Feels well, good sense of health.    Past Medical History, Family History, Surgical History, Medications and Allergies reviewed.    Past Medical History:   Diagnosis Date    Ankylosing spondylitis (HCC)     Arthritis     BRCA1 negative     BRCA2 negative     Crohn's     Stotz    Fistula 11/2011    RECTAL    GERD (gastroesophageal reflux disease)     Iritis 2010    Microcytic anemia     Umbilical hernia     Wears glasses      Past Surgical History:   Procedure Laterality Date    ABSCESS DRAINAGE  09/01/2011    RECTAL    APPENDECTOMY      CHOLECYSTECTOMY, LAPAROSCOPIC N/A 10/24/2023    LAPAROSCOPIC CHOLECYSTECTOMY WITH CHOLANGIOGRAM performed by Noel Nelson MD at [facility] OR    COLONOSCOPY      COLONOSCOPY  11/21/2011    COLONOSCOPY  12/01/2011    JOINT REPLACEMENT Right     HIP    OTHER SURGICAL HISTORY  01/01/2007    Terminal ileum resection     RECTAL SURGERY  07/31/2012    RECTAL ADVANCEMENT FLAP    SIGMOIDOSCOPY  09/12/2012    FLEXIBLE    SIGMOIDOSCOPY  6/7/2017    flexible sigmoidoscopy    SMALL INTESTINE SURGERY      ileocecectomy    TOTAL HIP ARTHROPLASTY Left 9/7/2022    LEFT TOTAL HIP ARTHROPLASTY MINIMALLY INVASIVE DIRECT ANTERIOR performed by Jose Luis Larose MD at [facility] OR    UPPER GASTROINTESTINAL ENDOSCOPY         Allergies   Allergen Reactions    Ciprofloxacin Anaphylaxis    Infliximab Anaphylaxis    Remicade [Infliximab Injection] Anaphylaxis     Outpatient Medications Marked as Taking

## (undated) DEVICE — MERCY FAIRFIELD TURNOVER KIT: Brand: MEDLINE INDUSTRIES, INC.

## (undated) DEVICE — 3M™ STERI-DRAPE™ INCISE DRAPE 1050 (60CM X 45CM): Brand: STERI-DRAPE™

## (undated) DEVICE — 3M™ TEGADERM™ TRANSPARENT FILM DRESSING FRAME STYLE, 1628, 6 IN X 8 IN (15 CM X 20 CM), 10/CT 8CT/CASE: Brand: 3M™ TEGADERM™

## (undated) DEVICE — TOWEL,STOP FLAG GOLD N-W: Brand: MEDLINE

## (undated) DEVICE — DRESSING W4XL8IN ISLANDTHERABOND 3D

## (undated) DEVICE — Device

## (undated) DEVICE — COTTON UNDERCAST PADDING,CRIMPED FINISH: Brand: WEBRIL

## (undated) DEVICE — SUTURE MCRYL SZ 4-0 L27IN ABSRB UD L19MM PS-2 1/2 CIR PRIM Y426H

## (undated) DEVICE — DRAPE,LAP,CHOLE,W/TROUGHS,STERILE: Brand: MEDLINE

## (undated) DEVICE — COVER LT HNDL BLU PLAS

## (undated) DEVICE — C-ARM: Brand: UNBRANDED

## (undated) DEVICE — SHEET,DRAPE,53X77,STERILE: Brand: MEDLINE

## (undated) DEVICE — SYRINGE MED 30ML STD CLR PLAS LUERLOCK TIP N CTRL DISP

## (undated) DEVICE — DRAPE,UTILTY,TAPE,15X26, 4EA/PK: Brand: MEDLINE

## (undated) DEVICE — SYRINGE MED 10ML TRNSLUC BRL PLUNG BLK MRK POLYPR CTRL

## (undated) DEVICE — GOWN SIRUS NONREIN LG W/TWL: Brand: MEDLINE INDUSTRIES, INC.

## (undated) DEVICE — LAPAROSCOPIC TROCAR SLEEVE/SINGLE USE: Brand: KII® LOW PROFILE OPTICAL ACCESS SYSTEM

## (undated) DEVICE — SOLUTION IRRIG 1000ML 09% SOD CHL USP PIC PLAS CONTAINER

## (undated) DEVICE — SOLUTION IRRIG 1000ML 0.9% SOD CHL USP POUR PLAS BTL

## (undated) DEVICE — 3M™ IOBAN™ 2 ANTIMICROBIAL INCISE DRAPE 6640EZ: Brand: IOBAN™ 2

## (undated) DEVICE — ELECTRODE PT RET AD L9FT HI MOIST COND ADH HYDRGEL CORDED

## (undated) DEVICE — GLOVE ORTHO 7 1/2   MSG9475

## (undated) DEVICE — SOLUTION IV 250ML 0.9% SOD CHL PH 5 INJ USP VIAFLX PLAS

## (undated) DEVICE — SYRINGE IRRIG 60ML SFT PLIABLE BLB EZ TO GRP 1 HND USE W/

## (undated) DEVICE — ANTERIOR TOTAL HIP: Brand: MEDLINE INDUSTRIES, INC.

## (undated) DEVICE — APPLICATOR MEDICATED 26 CC SOLUTION HI LT ORNG CHLORAPREP

## (undated) DEVICE — GLOVE SURG SZ 6.5 L11.2IN FNGR THK9.8MIL STRW LTX POLYMER

## (undated) DEVICE — DUAL CUT SAGITTAL BLADE

## (undated) DEVICE — GOWN,SIRUS,POLYRNF,BRTHSLV,XL,30/CS: Brand: MEDLINE

## (undated) DEVICE — HOLDER SCALP PLAS G STD

## (undated) DEVICE — SOLUTION IRRIG 3000ML 0.9% SOD CHL USP UROMATIC PLAS CONT

## (undated) DEVICE — GAMMEX® NON-LATEX SIZE 8, STERILE NEOPRENE POWDER-FREE SURGICAL GLOVE: Brand: GAMMEX

## (undated) DEVICE — LAPAROSCOPY PACK: Brand: MEDLINE INDUSTRIES, INC.

## (undated) DEVICE — CHLORAPREP 26ML ORANGE

## (undated) DEVICE — SOLUTION IV 1000ML 0.9% SOD CHL

## (undated) DEVICE — COVER MAYO STAND XL STRL DISP

## (undated) DEVICE — GLOVE ORANGE PI 7 1/2   MSG9075

## (undated) DEVICE — TROCAR: Brand: KII FIOS FIRST ENTRY

## (undated) DEVICE — SUTURE PERMAHAND SZ 0 L30IN NONABSORBABLE BLK SILK BRAID A306H

## (undated) DEVICE — SUTURE MCRYL + SZ 4-0 L27IN ABSRB UD L19MM PS-2 3/8 CIR MCP426H

## (undated) DEVICE — INDICATED FOR USE DURING OPEN AND LAPAROSCOPIC CHOLECYSTECTOMY PROCEDURES TO INJECT RADIOPAQUE MEDIA THROUGH THE CYSTIC DUCT INTO THE BILIARY TREE.: Brand: AEROSTAT®

## (undated) DEVICE — BAG RETRIEVAL SPECIMEN SUPERBAG 10 MEDIUM NYLON ITRODUCER

## (undated) DEVICE — SUTURE VCRL SZ 2-0 L18IN ABSRB UD CT-1 L36MM 1/2 CIR J839D

## (undated) DEVICE — SOLUTION IV IRRIG WATER 1000ML POUR BRL 2F7114

## (undated) DEVICE — SUTURE STRATAFIX SPRL SZ 1 L14IN ABSRB VLT L48CM CTX 1/2 SXPD2B405

## (undated) DEVICE — APPLIER CLP M/L SHFT DIA5MM 15 LIG LIGAMAX 5

## (undated) DEVICE — UNDERGLOVE SURG SZ 8 BLU LTX FREE SYN POLYISOPRENE POLYMER

## (undated) DEVICE — TROCAR SLEEVE: Brand: KII ® LOW PROFILE SLEEVE

## (undated) DEVICE — NEEDLE SPNL 20GA L3.5IN YEL HUB S STL REG WALL FIT STYL W/

## (undated) DEVICE — CORD ES L10FT MPLR LAP

## (undated) DEVICE — MARKER SURG SKIN UTIL BLK REG TIP NONSMEARING W/ 6IN RUL

## (undated) DEVICE — SUTURE ETHBND EXCEL SZ 2 L30IN NONABSORBABLE GRN L40MM V-37 MX69G

## (undated) DEVICE — DRAPE,HIP,W/POUCHES,STERILE: Brand: MEDLINE

## (undated) DEVICE — HYPODERMIC SAFETY NEEDLE: Brand: MAGELLAN

## (undated) DEVICE — 3M™ STERI-DRAPE™ INSTRUMENT POUCH 1018: Brand: STERI-DRAPE™

## (undated) DEVICE — 1010 S-DRAPE TOWEL DRAPE 10/BX: Brand: STERI-DRAPE™

## (undated) DEVICE — INTENDED FOR TISSUE SEPARATION, AND OTHER PROCEDURES THAT REQUIRE A SHARP SURGICAL BLADE TO PUNCTURE OR CUT.: Brand: BARD-PARKER ® CARBON RIB-BACK BLADES

## (undated) DEVICE — LIQUIBAND RAPID ADHESIVE 36/CS 0.8ML: Brand: MEDLINE

## (undated) DEVICE — BIPOLAR SEALER 23-112-1 AQM 6.0: Brand: AQUAMANTYS ®